# Patient Record
Sex: FEMALE | Race: WHITE | NOT HISPANIC OR LATINO
[De-identification: names, ages, dates, MRNs, and addresses within clinical notes are randomized per-mention and may not be internally consistent; named-entity substitution may affect disease eponyms.]

---

## 2017-07-18 ENCOUNTER — APPOINTMENT (OUTPATIENT)
Dept: RADIATION ONCOLOGY | Facility: CLINIC | Age: 82
End: 2017-07-18

## 2017-07-18 VITALS
TEMPERATURE: 98 F | RESPIRATION RATE: 16 BRPM | SYSTOLIC BLOOD PRESSURE: 167 MMHG | WEIGHT: 95.2 LBS | OXYGEN SATURATION: 94 % | DIASTOLIC BLOOD PRESSURE: 72 MMHG | HEART RATE: 74 BPM

## 2018-04-05 ENCOUNTER — EMERGENCY (EMERGENCY)
Facility: HOSPITAL | Age: 83
LOS: 1 days | Discharge: HOME | End: 2018-04-05
Attending: EMERGENCY MEDICINE
Payer: MEDICARE

## 2018-04-05 VITALS
OXYGEN SATURATION: 97 % | SYSTOLIC BLOOD PRESSURE: 175 MMHG | DIASTOLIC BLOOD PRESSURE: 71 MMHG | RESPIRATION RATE: 17 BRPM | HEART RATE: 79 BPM | TEMPERATURE: 97 F

## 2018-04-05 VITALS — WEIGHT: 97 LBS

## 2018-04-05 DIAGNOSIS — Z98.89 OTHER SPECIFIED POSTPROCEDURAL STATES: Chronic | ICD-10-CM

## 2018-04-05 DIAGNOSIS — Z98.49 CATARACT EXTRACTION STATUS, UNSPECIFIED EYE: Chronic | ICD-10-CM

## 2018-04-05 PROCEDURE — 70450 CT HEAD/BRAIN W/O DYE: CPT | Mod: 26

## 2018-04-05 PROCEDURE — 99284 EMERGENCY DEPT VISIT MOD MDM: CPT

## 2018-04-05 PROCEDURE — 72125 CT NECK SPINE W/O DYE: CPT

## 2018-04-05 PROCEDURE — 70450 CT HEAD/BRAIN W/O DYE: CPT

## 2018-04-05 PROCEDURE — 72125 CT NECK SPINE W/O DYE: CPT | Mod: 26

## 2018-04-05 PROCEDURE — 99284 EMERGENCY DEPT VISIT MOD MDM: CPT | Mod: 25

## 2018-04-05 RX ORDER — ACETAMINOPHEN 500 MG
975 TABLET ORAL ONCE
Qty: 0 | Refills: 0 | Status: COMPLETED | OUTPATIENT
Start: 2018-04-05 | End: 2018-04-05

## 2018-04-05 RX ADMIN — Medication 975 MILLIGRAM(S): at 20:52

## 2018-04-05 NOTE — ED ADULT NURSE REASSESSMENT NOTE - NS ED NURSE REASSESS COMMENT FT1
Pt discharged home with follow up care and instructions. PT educated about prescriptions and follow up instructions. Pt provided discharge instructions and IV removed. Pt had pressure bandage placed and bleeding controlled. Pt not in any distress at this time. Pt is complaining of discomfort, but is not going to take any medications tonight, pt will take prescription home and medicate tomorrow if needed. Pt education deemed successful after successful teach back was performed by pt and pts brother at the bedside. Pt discharged home with follow up care and instructions. PT educated about prescriptions and follow up instructions. Pt provided discharge instructions.  Pt not in any distress at this time. Pt is complaining of discomfort, but is not going to take any medications tonight, pt will take prescription home and medicate tomorrow if needed. Pt education deemed successful after successful teach back was performed by pt and pts brother at the bedside.

## 2018-04-05 NOTE — ED ADULT NURSE REASSESSMENT NOTE - NS ED NURSE REASSESS COMMENT FT1
Pt is resting in the stretcher with her family at the bedside. Pt is refusing to be medicated for her headache and her pain. Pt states she is "Afraid of the side effects of both and either of those medications". Pt understands after being educated that there is a benefit to the medication, but she still insisted to refuse. Pt was not given either medication she was ordered, the valium and fioricet. Pt to be discharged home with family.

## 2018-04-05 NOTE — ED ADULT TRIAGE NOTE - CHIEF COMPLAINT QUOTE
pt c/o "pressure" in head, started about 2 weeks ago but had subsided, returned today, went to stat and advised need ct but pt did not feel well enough to go

## 2018-04-05 NOTE — ED PROVIDER NOTE - MEDICAL DECISION MAKING DETAILS
CT head, neck, with HX of possible fracture in the neck. Will give Tylenol because pt is refusing all other medications.

## 2018-04-05 NOTE — ED PROVIDER NOTE - OBJECTIVE STATEMENT
89 y/o F pt with a pmhx of spinal Fx, arthritis, HTN, high cholesterol, hyponatremia, glaucoma, breast CA and cataract extraction presents to the ED c/o headache intermittent x2 weeks. Pain is exacerbating today. Describes a pressure. Explains that the headache is so bad that it is hurting her vision. Daughter at bedside to provide Hx that she has gone to her Onomatologist to get the vision checked and her PMD for the issue. Pt's daughter explains that the pt has been sleeping more often than not. Has taken Tylenol for the issue with relief enough to allow her to sleep. Today pt went to an urgent care center who referred her to the ED for a CT scan of the head. Allergic to penicillin and sulfenamide. Denies neurological/focal deficits, new onset back pain, visual changes, n/v/d/c, urinary symptoms, fall, recent travel or any other complaints. NKDA.   PMD: Dr. Tobin.

## 2018-04-05 NOTE — ED ADULT NURSE NOTE - OBJECTIVE STATEMENT
pt awake and alert, reports she has had a headache for appx 2 weeks. states it subsided but is now back again. pt with no vision changes, no numbness/tingling to extremities. even and unlabored resps, no extremity weakness.

## 2018-04-05 NOTE — ED ADULT NURSE REASSESSMENT NOTE - NS ED NURSE REASSESS COMMENT FT1
Ptis in the stretcher with her daughter at the bedside. Pt is expressing some pain at this time, but is not in any distress. Pt returned from CT and is now awaiting results at this time. Pt has no needs expressed at this time.

## 2018-04-05 NOTE — ED PROVIDER NOTE - PROGRESS NOTE DETAILS
CT results as noted.  Pt still c/o pressure in her head.  Fioricet ordered and will re-eval Pt refusing meds and would like to be d/c.  Pt's brother in ED to take her home.  Copies of CT's given to pt

## 2018-09-06 ENCOUNTER — APPOINTMENT (OUTPATIENT)
Dept: FAMILY MEDICINE | Facility: CLINIC | Age: 83
End: 2018-09-06

## 2018-09-17 ENCOUNTER — LABORATORY RESULT (OUTPATIENT)
Age: 83
End: 2018-09-17

## 2018-09-17 ENCOUNTER — APPOINTMENT (OUTPATIENT)
Dept: FAMILY MEDICINE | Facility: CLINIC | Age: 83
End: 2018-09-17
Payer: MEDICARE

## 2018-09-17 VITALS
HEIGHT: 56 IN | SYSTOLIC BLOOD PRESSURE: 142 MMHG | BODY MASS INDEX: 19.12 KG/M2 | DIASTOLIC BLOOD PRESSURE: 80 MMHG | OXYGEN SATURATION: 97 % | WEIGHT: 85 LBS | HEART RATE: 67 BPM

## 2018-09-17 DIAGNOSIS — Z85.3 PERSONAL HISTORY OF MALIGNANT NEOPLASM OF BREAST: ICD-10-CM

## 2018-09-17 DIAGNOSIS — Z87.891 PERSONAL HISTORY OF NICOTINE DEPENDENCE: ICD-10-CM

## 2018-09-17 DIAGNOSIS — Z86.39 PERSONAL HISTORY OF OTHER ENDOCRINE, NUTRITIONAL AND METABOLIC DISEASE: ICD-10-CM

## 2018-09-17 DIAGNOSIS — Z00.00 ENCOUNTER FOR GENERAL ADULT MEDICAL EXAMINATION W/OUT ABNORMAL FINDINGS: ICD-10-CM

## 2018-09-17 DIAGNOSIS — Z78.9 OTHER SPECIFIED HEALTH STATUS: ICD-10-CM

## 2018-09-17 DIAGNOSIS — R01.1 CARDIAC MURMUR, UNSPECIFIED: ICD-10-CM

## 2018-09-17 DIAGNOSIS — Z86.79 PERSONAL HISTORY OF OTHER DISEASES OF THE CIRCULATORY SYSTEM: ICD-10-CM

## 2018-09-17 DIAGNOSIS — E78.5 HYPERLIPIDEMIA, UNSPECIFIED: ICD-10-CM

## 2018-09-17 PROCEDURE — G0438: CPT

## 2018-09-17 PROCEDURE — 36415 COLL VENOUS BLD VENIPUNCTURE: CPT | Mod: 59

## 2018-09-17 PROCEDURE — 99214 OFFICE O/P EST MOD 30 MIN: CPT | Mod: 25

## 2018-09-17 NOTE — ASSESSMENT
[FreeTextEntry1] : CPE- lab/ declined immunization\par \par Weight loss/anxiety- discussed being on meds for anxiety/ patient declines and grand daughter aware. Patient states \par \par HTN- controlled / has slight edema / sees cardio \par \par heart murmur- get cardio consults\par \par Hyperlipidemia- renew Pravachol DORI.\par \par follow up 3 months PRN\par Bring health care Proxy

## 2018-09-17 NOTE — PHYSICAL EXAM

## 2018-09-17 NOTE — HISTORY OF PRESENT ILLNESS
[FreeTextEntry1] : establish care/ here with grand daughter [de-identified] : Previous MD stopped taking Medicaid hence was referred here.\par Feels okay overall,lives with her daughter and has her own apt where she cooks clean etc. When I questioned about weight loss patient states that she is little depressed as her 64 year old son drinks alcohol and her liver is affected now. she is very worries about her.\par She sees cardio\par She doesnot like taking any shots

## 2018-09-18 ENCOUNTER — MEDICATION RENEWAL (OUTPATIENT)
Age: 83
End: 2018-09-18

## 2018-09-25 ENCOUNTER — MEDICATION RENEWAL (OUTPATIENT)
Age: 83
End: 2018-09-25

## 2018-10-08 ENCOUNTER — RX RENEWAL (OUTPATIENT)
Age: 83
End: 2018-10-08

## 2018-10-09 ENCOUNTER — RX RENEWAL (OUTPATIENT)
Age: 83
End: 2018-10-09

## 2018-10-09 DIAGNOSIS — E55.9 VITAMIN D DEFICIENCY, UNSPECIFIED: ICD-10-CM

## 2018-10-16 ENCOUNTER — APPOINTMENT (OUTPATIENT)
Dept: FAMILY MEDICINE | Facility: CLINIC | Age: 83
End: 2018-10-16
Payer: MEDICARE

## 2018-10-16 VITALS
SYSTOLIC BLOOD PRESSURE: 140 MMHG | HEIGHT: 56 IN | HEART RATE: 68 BPM | WEIGHT: 85 LBS | BODY MASS INDEX: 19.12 KG/M2 | DIASTOLIC BLOOD PRESSURE: 72 MMHG

## 2018-10-16 DIAGNOSIS — E53.8 DEFICIENCY OF OTHER SPECIFIED B GROUP VITAMINS: ICD-10-CM

## 2018-10-16 LAB
25(OH)D3 SERPL-MCNC: 38.8 NG/ML
ALBUMIN SERPL ELPH-MCNC: 4.3 G/DL
ALP BLD-CCNC: 82 U/L
ALT SERPL-CCNC: 16 U/L
ANION GAP SERPL CALC-SCNC: 14 MMOL/L
APPEARANCE: CLEAR
AST SERPL-CCNC: 25 U/L
BASOPHILS # BLD AUTO: 0.03 K/UL
BASOPHILS NFR BLD AUTO: 0.8 %
BILIRUB SERPL-MCNC: 0.5 MG/DL
BILIRUBIN URINE: NEGATIVE
BLOOD URINE: NEGATIVE
BUN SERPL-MCNC: 22 MG/DL
CALCIUM SERPL-MCNC: 9.6 MG/DL
CHLORIDE SERPL-SCNC: 106 MMOL/L
CHOLEST SERPL-MCNC: 168 MG/DL
CHOLEST/HDLC SERPL: 2 RATIO
CO2 SERPL-SCNC: 22 MMOL/L
COLOR: YELLOW
CREAT SERPL-MCNC: 1.56 MG/DL
EOSINOPHIL # BLD AUTO: 0.11 K/UL
EOSINOPHIL NFR BLD AUTO: 2.9 %
FERRITIN SERPL-MCNC: 111 NG/ML
FOLATE SERPL-MCNC: 7.6 NG/ML
GLUCOSE QUALITATIVE U: NEGATIVE MG/DL
GLUCOSE SERPL-MCNC: 80 MG/DL
HBA1C MFR BLD HPLC: 5.4 %
HCT VFR BLD CALC: 33 %
HDLC SERPL-MCNC: 82 MG/DL
HGB BLD-MCNC: 10.5 G/DL
IMM GRANULOCYTES NFR BLD AUTO: 0.3 %
IRON SATN MFR SERPL: 54 %
IRON SERPL-MCNC: 150 UG/DL
KETONES URINE: NEGATIVE
LDLC SERPL CALC-MCNC: 69 MG/DL
LEUKOCYTE ESTERASE URINE: NEGATIVE
LYMPHOCYTES # BLD AUTO: 1.83 K/UL
LYMPHOCYTES NFR BLD AUTO: 48.4 %
MAN DIFF?: NORMAL
MCHC RBC-ENTMCNC: 31.8 GM/DL
MCHC RBC-ENTMCNC: 33.2 PG
MCV RBC AUTO: 104.4 FL
MONOCYTES # BLD AUTO: 0.42 K/UL
MONOCYTES NFR BLD AUTO: 11.1 %
NEUTROPHILS # BLD AUTO: 1.38 K/UL
NEUTROPHILS NFR BLD AUTO: 36.5 %
NITRITE URINE: NEGATIVE
PH URINE: 5
PLATELET # BLD AUTO: 201 K/UL
POTASSIUM SERPL-SCNC: 5.7 MMOL/L
PROT SERPL-MCNC: 6.8 G/DL
PROTEIN URINE: 30 MG/DL
RBC # BLD: 3.16 M/UL
RBC # FLD: 14.4 %
SODIUM SERPL-SCNC: 142 MMOL/L
SPECIFIC GRAVITY URINE: 1.01
T PALLIDUM AB SER QL IA: NEGATIVE
T4 FREE SERPL-MCNC: 1.2 NG/DL
TIBC SERPL-MCNC: 277 UG/DL
TRIGL SERPL-MCNC: 84 MG/DL
TSH SERPL-ACNC: 3.95 UIU/ML
UIBC SERPL-MCNC: 127 UG/DL
UROBILINOGEN URINE: NEGATIVE MG/DL
VIT B12 SERPL-MCNC: 328 PG/ML
WBC # FLD AUTO: 3.78 K/UL

## 2018-10-16 PROCEDURE — 36415 COLL VENOUS BLD VENIPUNCTURE: CPT

## 2018-10-16 PROCEDURE — 99214 OFFICE O/P EST MOD 30 MIN: CPT | Mod: 25

## 2018-10-16 NOTE — HISTORY OF PRESENT ILLNESS
[FreeTextEntry1] : follow up on abnormal lab / here with grand daughter [de-identified] : Previous MD stopped taking Medicaid hence was referred here.\par Feels okay overall,lives with her daughter and has her own apt where she cooks clean etc. When I questioned about weight loss patient states that she is little depressed as her 64 year old son drinks alcohol and her liver is affected now. she is very worries about her.\par She sees cardio\par She doesnot like taking any shots\par 10/2018- abnormal lab follow up\par offers no complaints

## 2018-10-16 NOTE — HEALTH RISK ASSESSMENT
[] : No [No falls in past year] : Patient reported no falls in the past year [0] : 2) Feeling down, depressed, or hopeless: Not at all (0) [Change in mental status noted] : No change in mental status noted [Language] : denies difficulty with language [Handling Complex Tasks] : denies difficulty handling complex tasks [None] : None [With Family] : lives with family [Retired] : retired [Fully functional (bathing, dressing, toileting, transferring, walking, feeding)] : Fully functional (bathing, dressing, toileting, transferring, walking, feeding) [Fully functional (using the telephone, shopping, preparing meals, housekeeping, doing laundry, using] : Fully functional and needs no help or supervision to perform IADLs (using the telephone, shopping, preparing meals, housekeeping, doing laundry, using transportation, managing medications and managing finances) [Discussed at today's visit] : Advance Directives Discussed at today's visit

## 2018-10-16 NOTE — PHYSICAL EXAM

## 2018-10-16 NOTE — ASSESSMENT
[FreeTextEntry1] : Hyperkalemia- will get new lab/CMP\par Anemia and low Vit b12- advise SL B12 3x/week\par \par follow up 3 months\par \par CPE- lab/ declined immunization\par \par Weight loss/anxiety- discussed being on meds for anxiety/ patient declines and grand daughter aware. Patient states \par \par HTN- controlled / has slight edema / sees cardio \par \par heart murmur- get cardio consults\par \par Hyperlipidemia- renew Pravachol DORI.\par \par follow up 3 months PRN\par Bring health care Proxy

## 2018-10-18 ENCOUNTER — RX RENEWAL (OUTPATIENT)
Age: 83
End: 2018-10-18

## 2018-10-24 ENCOUNTER — RESULT REVIEW (OUTPATIENT)
Age: 83
End: 2018-10-24

## 2018-10-24 LAB
ALBUMIN SERPL ELPH-MCNC: 4.5 G/DL
ALP BLD-CCNC: 81 U/L
ALT SERPL-CCNC: 11 U/L
ANION GAP SERPL CALC-SCNC: 12 MMOL/L
AST SERPL-CCNC: 23 U/L
BILIRUB SERPL-MCNC: 0.4 MG/DL
BUN SERPL-MCNC: 28 MG/DL
CALCIUM SERPL-MCNC: 9.5 MG/DL
CHLORIDE SERPL-SCNC: 110 MMOL/L
CO2 SERPL-SCNC: 22 MMOL/L
CREAT SERPL-MCNC: 1.57 MG/DL
GLUCOSE SERPL-MCNC: 84 MG/DL
POTASSIUM SERPL-SCNC: 5.2 MMOL/L
PROT SERPL-MCNC: 7 G/DL
SODIUM SERPL-SCNC: 144 MMOL/L

## 2018-11-05 DIAGNOSIS — H91.90 UNSPECIFIED HEARING LOSS, UNSPECIFIED EAR: ICD-10-CM

## 2018-12-10 PROBLEM — H91.90 HARD OF HEARING: Status: ACTIVE | Noted: 2018-09-17

## 2018-12-17 ENCOUNTER — APPOINTMENT (OUTPATIENT)
Dept: FAMILY MEDICINE | Facility: CLINIC | Age: 83
End: 2018-12-17

## 2019-03-04 ENCOUNTER — MEDICATION RENEWAL (OUTPATIENT)
Age: 84
End: 2019-03-04

## 2019-04-01 ENCOUNTER — RX RENEWAL (OUTPATIENT)
Age: 84
End: 2019-04-01

## 2019-04-04 ENCOUNTER — INPATIENT (INPATIENT)
Facility: HOSPITAL | Age: 84
LOS: 5 days | Discharge: HOME | DRG: 438 | End: 2019-04-10
Admitting: HOSPITALIST
Payer: MEDICARE

## 2019-04-04 ENCOUNTER — RX RENEWAL (OUTPATIENT)
Age: 84
End: 2019-04-04

## 2019-04-04 VITALS
HEART RATE: 83 BPM | TEMPERATURE: 98 F | RESPIRATION RATE: 20 BRPM | OXYGEN SATURATION: 95 % | DIASTOLIC BLOOD PRESSURE: 81 MMHG | WEIGHT: 130.07 LBS | SYSTOLIC BLOOD PRESSURE: 181 MMHG

## 2019-04-04 DIAGNOSIS — K85.90 ACUTE PANCREATITIS WITHOUT NECROSIS OR INFECTION, UNSPECIFIED: ICD-10-CM

## 2019-04-04 DIAGNOSIS — Z98.49 CATARACT EXTRACTION STATUS, UNSPECIFIED EYE: Chronic | ICD-10-CM

## 2019-04-04 DIAGNOSIS — Z98.89 OTHER SPECIFIED POSTPROCEDURAL STATES: Chronic | ICD-10-CM

## 2019-04-04 LAB
ALBUMIN SERPL ELPH-MCNC: 4.3 G/DL — SIGNIFICANT CHANGE UP (ref 3.3–5.2)
ALP SERPL-CCNC: 249 U/L — HIGH (ref 40–120)
ALT FLD-CCNC: 188 U/L — HIGH
ANION GAP SERPL CALC-SCNC: 16 MMOL/L — SIGNIFICANT CHANGE UP (ref 5–17)
APPEARANCE UR: CLEAR — SIGNIFICANT CHANGE UP
APTT BLD: 37.2 SEC — HIGH (ref 27.5–36.3)
AST SERPL-CCNC: 478 U/L — HIGH
BILIRUB SERPL-MCNC: 1.5 MG/DL — SIGNIFICANT CHANGE UP (ref 0.4–2)
BILIRUB UR-MCNC: NEGATIVE — SIGNIFICANT CHANGE UP
BUN SERPL-MCNC: 37 MG/DL — HIGH (ref 8–20)
CALCIUM SERPL-MCNC: 10.2 MG/DL — SIGNIFICANT CHANGE UP (ref 8.6–10.2)
CHLORIDE SERPL-SCNC: 106 MMOL/L — SIGNIFICANT CHANGE UP (ref 98–107)
CHOLEST SERPL-MCNC: 236 MG/DL — HIGH (ref 110–199)
CO2 SERPL-SCNC: 22 MMOL/L — SIGNIFICANT CHANGE UP (ref 22–29)
COLOR SPEC: YELLOW — SIGNIFICANT CHANGE UP
CREAT SERPL-MCNC: 2.18 MG/DL — HIGH (ref 0.5–1.3)
D DIMER BLD IA.RAPID-MCNC: 1947 NG/ML DDU — HIGH
DIFF PNL FLD: ABNORMAL
GLUCOSE SERPL-MCNC: 141 MG/DL — HIGH (ref 70–115)
GLUCOSE UR QL: NEGATIVE MG/DL — SIGNIFICANT CHANGE UP
HCT VFR BLD CALC: 32.7 % — LOW (ref 37–47)
HDLC SERPL-MCNC: 130 MG/DL — SIGNIFICANT CHANGE UP
HGB BLD-MCNC: 10.7 G/DL — LOW (ref 12–16)
INR BLD: 0.85 RATIO — LOW (ref 0.88–1.16)
KETONES UR-MCNC: NEGATIVE — SIGNIFICANT CHANGE UP
LEUKOCYTE ESTERASE UR-ACNC: NEGATIVE — SIGNIFICANT CHANGE UP
LIDOCAIN IGE QN: >3000 U/L — HIGH (ref 22–51)
LIPID PNL WITH DIRECT LDL SERPL: 77 MG/DL — SIGNIFICANT CHANGE UP
MCHC RBC-ENTMCNC: 32.7 G/DL — SIGNIFICANT CHANGE UP (ref 32–36)
MCHC RBC-ENTMCNC: 33.3 PG — HIGH (ref 27–31)
MCV RBC AUTO: 101.9 FL — HIGH (ref 81–99)
NITRITE UR-MCNC: NEGATIVE — SIGNIFICANT CHANGE UP
NT-PROBNP SERPL-SCNC: 1735 PG/ML — HIGH (ref 0–300)
PH UR: 6.5 — SIGNIFICANT CHANGE UP (ref 5–8)
PLATELET # BLD AUTO: 209 K/UL — SIGNIFICANT CHANGE UP (ref 150–400)
POTASSIUM SERPL-MCNC: 4.5 MMOL/L — SIGNIFICANT CHANGE UP (ref 3.5–5.3)
POTASSIUM SERPL-SCNC: 4.5 MMOL/L — SIGNIFICANT CHANGE UP (ref 3.5–5.3)
PROT SERPL-MCNC: 8 G/DL — SIGNIFICANT CHANGE UP (ref 6.6–8.7)
PROT UR-MCNC: 100 MG/DL
PROTHROM AB SERPL-ACNC: 9.7 SEC — LOW (ref 10–12.9)
RBC # BLD: 3.21 M/UL — LOW (ref 4.4–5.2)
RBC # FLD: 15.8 % — HIGH (ref 11–15.6)
SODIUM SERPL-SCNC: 144 MMOL/L — SIGNIFICANT CHANGE UP (ref 135–145)
SP GR SPEC: 1.01 — SIGNIFICANT CHANGE UP (ref 1.01–1.02)
TOTAL CHOLESTEROL/HDL RATIO MEASUREMENT: 2 RATIO — LOW (ref 3.3–7.1)
TRIGL SERPL-MCNC: 143 MG/DL — SIGNIFICANT CHANGE UP (ref 10–200)
TROPONIN T SERPL-MCNC: 0.02 NG/ML — SIGNIFICANT CHANGE UP (ref 0–0.06)
UROBILINOGEN FLD QL: NEGATIVE MG/DL — SIGNIFICANT CHANGE UP
WBC # BLD: 11.2 K/UL — HIGH (ref 4.8–10.8)
WBC # FLD AUTO: 11.2 K/UL — HIGH (ref 4.8–10.8)

## 2019-04-04 PROCEDURE — 71250 CT THORAX DX C-: CPT | Mod: 26

## 2019-04-04 PROCEDURE — 74019 RADEX ABDOMEN 2 VIEWS: CPT | Mod: 26

## 2019-04-04 PROCEDURE — 99223 1ST HOSP IP/OBS HIGH 75: CPT | Mod: GC

## 2019-04-04 PROCEDURE — 74176 CT ABD & PELVIS W/O CONTRAST: CPT | Mod: 26

## 2019-04-04 PROCEDURE — 93010 ELECTROCARDIOGRAM REPORT: CPT

## 2019-04-04 PROCEDURE — 99285 EMERGENCY DEPT VISIT HI MDM: CPT

## 2019-04-04 PROCEDURE — 76705 ECHO EXAM OF ABDOMEN: CPT | Mod: 26

## 2019-04-04 PROCEDURE — 71046 X-RAY EXAM CHEST 2 VIEWS: CPT | Mod: 26

## 2019-04-04 RX ORDER — FENTANYL CITRATE 50 UG/ML
1 INJECTION INTRAVENOUS
Qty: 0 | Refills: 0 | Status: DISCONTINUED | OUTPATIENT
Start: 2019-04-04 | End: 2019-04-10

## 2019-04-04 RX ORDER — LEVOTHYROXINE SODIUM 125 MCG
25 TABLET ORAL DAILY
Qty: 0 | Refills: 0 | Status: DISCONTINUED | OUTPATIENT
Start: 2019-04-04 | End: 2019-04-10

## 2019-04-04 RX ORDER — HYDRALAZINE HCL 50 MG
5 TABLET ORAL ONCE
Qty: 0 | Refills: 0 | Status: COMPLETED | OUTPATIENT
Start: 2019-04-04 | End: 2019-04-04

## 2019-04-04 RX ORDER — ONDANSETRON 8 MG/1
4 TABLET, FILM COATED ORAL ONCE
Qty: 0 | Refills: 0 | Status: COMPLETED | OUTPATIENT
Start: 2019-04-04 | End: 2019-04-04

## 2019-04-04 RX ORDER — ACETAMINOPHEN 500 MG
1000 TABLET ORAL ONCE
Qty: 0 | Refills: 0 | Status: COMPLETED | OUTPATIENT
Start: 2019-04-04 | End: 2019-04-04

## 2019-04-04 RX ORDER — NIFEDIPINE 30 MG
30 TABLET, EXTENDED RELEASE 24 HR ORAL DAILY
Qty: 0 | Refills: 0 | Status: DISCONTINUED | OUTPATIENT
Start: 2019-04-04 | End: 2019-04-08

## 2019-04-04 RX ORDER — ASPIRIN/CALCIUM CARB/MAGNESIUM 324 MG
81 TABLET ORAL DAILY
Qty: 0 | Refills: 0 | Status: DISCONTINUED | OUTPATIENT
Start: 2019-04-04 | End: 2019-04-10

## 2019-04-04 RX ORDER — ATORVASTATIN CALCIUM 80 MG/1
10 TABLET, FILM COATED ORAL AT BEDTIME
Qty: 0 | Refills: 0 | Status: DISCONTINUED | OUTPATIENT
Start: 2019-04-05 | End: 2019-04-05

## 2019-04-04 RX ORDER — SODIUM CHLORIDE 9 MG/ML
1000 INJECTION, SOLUTION INTRAVENOUS
Qty: 0 | Refills: 0 | Status: DISCONTINUED | OUTPATIENT
Start: 2019-04-04 | End: 2019-04-05

## 2019-04-04 RX ORDER — FOLIC ACID 0.8 MG
1 TABLET ORAL DAILY
Qty: 0 | Refills: 0 | Status: DISCONTINUED | OUTPATIENT
Start: 2019-04-04 | End: 2019-04-10

## 2019-04-04 RX ORDER — PREGABALIN 225 MG/1
1000 CAPSULE ORAL DAILY
Qty: 0 | Refills: 0 | Status: DISCONTINUED | OUTPATIENT
Start: 2019-04-04 | End: 2019-04-10

## 2019-04-04 RX ORDER — ONDANSETRON 8 MG/1
4 TABLET, FILM COATED ORAL EVERY 6 HOURS
Qty: 0 | Refills: 0 | Status: DISCONTINUED | OUTPATIENT
Start: 2019-04-04 | End: 2019-04-10

## 2019-04-04 RX ADMIN — FENTANYL CITRATE 1 PATCH: 50 INJECTION INTRAVENOUS at 21:20

## 2019-04-04 RX ADMIN — SODIUM CHLORIDE 100 MILLILITER(S): 9 INJECTION, SOLUTION INTRAVENOUS at 22:18

## 2019-04-04 RX ADMIN — Medication 5 MILLIGRAM(S): at 18:30

## 2019-04-04 RX ADMIN — Medication 400 MILLIGRAM(S): at 14:00

## 2019-04-04 RX ADMIN — ONDANSETRON 4 MILLIGRAM(S): 8 TABLET, FILM COATED ORAL at 14:00

## 2019-04-04 NOTE — H&P ADULT - ASSESSMENT
89 yo F with PMH of HTN, Glaucoma, Hypothyroidism, Hx of Rt Breast CA s/p lumpectomy and radiation with abd pain radiating to back associated with n/v admitted for acute pancreatitis    Acute pancreatitis  - CT abd pelvis 89 yo F with PMH of HTN, Glaucoma, Hypothyroidism, Hx of Rt Breast CA s/p lumpectomy and radiation with abd pain radiating to back associated with n/v admitted for acute pancreatitis    Acute pancreatitis  - Elevated lipase >3000; WBC 11.2; hemodynamically stable  - BISAP score of 3; Ransons pancreatitis: 3 (Age, AST, ALT)  - CT abd pelvis pancreas grossly normal; Cholelithiasis w/o CT evidence of Cholecystitis  - U/s Gallbladder: Cholelithiasis; CBD 8mm wnl  - NPO till n/v subsidies, will PO challenge when medically stable  - IVF plasmalyte   - Pain control  - strict I&O  - f/u lipid panel    NAVIN  - most recent Cr 2.18; baseline Cr 1.45-1.55  - continue IVF  - f/u CMP AM    Transaminitis  - ; ; Alk phos 249  - most likely from pancreatitis    Cholelithiasis w/o cholecystitis  - incidental finding on U/S and CT abd pelvis  - continue to monitor    HTN  - c/w Nifedipine  - c/w ASA 81  - monitor VS    CKD 4 GFR 18  - most likely from HTN  - continue to monitor     Heart Murmur  - most likely AS  - last ECHO 2016 normal LVSF; Grade 1 DD; Mild-mod TR; mild Pul HTN; mild AS    Hypothyroidism  - c/w synthroid 25mcg  - f/u TSH    Glaucoma  - c/w cosopt     DVT PPx: venodyne  Advance Directive: Full code, no official HCP. Discussion at bedside with daughters and family to start a conversation for advance care directives 89 yo F with PMH of HTN, Glaucoma, Hypothyroidism, Hx of Rt Breast CA s/p lumpectomy and radiation with abd pain radiating to back associated with n/v admitted for acute pancreatitis    Acute pancreatitis  - Elevated lipase >3000; WBC 11.2; hemodynamically stable  - BISAP score of 3; Ransons pancreatitis: 3 (Age, AST, ALT)  - CT abd pelvis pancreas grossly normal; Cholelithiasis w/o CT evidence of Cholecystitis  - U/s Gallbladder: Cholelithiasis; CBD 8mm wnl  - NPO till n/v subsidies, will PO challenge when medically stable  - IVF plasmalyte   - Pain control  - strict I&O  - f/u lipid panel    NAVIN  - most recent Cr 2.18; baseline Cr 1.45-1.55  - continue IVF  - f/u CMP AM    Transaminitis  - ; ; Alk phos 249  - most likely from pancreatitis    Cholelithiasis w/o cholecystitis  - incidental finding on U/S and CT abd pelvis  - continue to monitor    Anemia  - macrocytic with   - folic acid and vitamin b12  - f/u AM CBC, folate, Vitamin B12 level    HTN  - c/w Nifedipine  - c/w ASA 81  - monitor VS    CKD 4 GFR 18  - most likely from HTN  - continue to monitor     Heart Murmur  - most likely AS  - last ECHO 2016 normal LVSF; Grade 1 DD; Mild-mod TR; mild Pul HTN; mild AS  - will evaluate the need for ECHO once medically stable    Hypothyroidism  - c/w synthroid 25mcg  - f/u TSH    Glaucoma  - c/w cosopt       DVT PPx: venodyne  Advance Directive: Full code, no official HCP. Discussion at bedside with daughters and family to start a conversation for advance care directives 89 yo F with PMH of HTN, Glaucoma, Hypothyroidism, Hx of Rt Breast CA s/p lumpectomy and radiation with abd pain radiating to back associated with n/v admitted for acute pancreatitis    Acute pancreatitis  - Elevated lipase >3000; WBC 11.2; hemodynamically stable  - BISAP score of 3; Ransons pancreatitis: 3 (Age, AST, ALT)  - CT abd pelvis pancreas grossly normal; Cholelithiasis w/o CT evidence of Cholecystitis  - U/s Gallbladder: Cholelithiasis; CBD 8mm wnl  - NPO till n/v subsidies, will PO challenge when medically stable  - IVF plasmalyte   - Pain control  - strict I&O  - Lipid panel wnl    NAVIN  - most recent Cr 2.18; baseline Cr 1.45-1.55  - continue IVF  - f/u CMP AM    Transaminitis  - ; ; Alk phos 249  - most likely from pancreatitis    Cholelithiasis w/o cholecystitis  - incidental finding on U/S and CT abd pelvis  - continue to monitor    Anemia  - macrocytic with   - folic acid and vitamin b12  - f/u AM CBC, folate, Vitamin B12 level    HTN  - c/w Nifedipine  - c/w ASA 81  - monitor VS    CKD 4 GFR 18  - most likely from HTN  - continue to monitor     Heart Murmur  - most likely AS  - last ECHO 2016 normal LVSF; Grade 1 DD; Mild-mod TR; mild Pul HTN; mild AS  - will evaluate the need for ECHO once medically stable    Hypothyroidism  - c/w synthroid 25mcg  - f/u TSH    Glaucoma  - c/w cosopt       DVT PPx: venodyne  Advance Directive: Full code, no official HCP. Discussion at bedside with daughters and family to start a conversation for advance care directives 89 yo F with PMH of HTN, Glaucoma, Hypothyroidism, Hx of Rt Breast CA s/p lumpectomy and radiation with abd pain radiating to back associated with n/v admitted for acute pancreatitis    Acute pancreatitis  - Elevated lipase >3000; WBC 11.2; hemodynamically stable  - BISAP score of 3; Ransons pancreatitis: 3 (Age, AST, ALT)  - CT abd pelvis pancreas grossly normal; Cholelithiasis w/o CT evidence of Cholecystitis  - U/s Gallbladder: Cholelithiasis; CBD 8mm wnl  - NPO till n/v subsidies, will PO challenge when medically stable  - IVF plasmalyte   - Pain control  - strict I&O  - Lipid panel wnl    NAVIN  - most recent Cr 2.18; baseline Cr 1.45-1.55  - continue IVF  - f/u CMP AM    Transaminitis  - ; ; Alk phos 249  - most likely from pancreatitis    Cholelithiasis w/o cholecystitis  - incidental finding on U/S and CT abd pelvis  - continue to monitor    Anemia  - macrocytic with   - folic acid and vitamin b12  - f/u AM CBC, folate, Vitamin B12 level    HTN  - c/w Nifedipine  - c/w ASA 81  - monitor VS    CKD 4 GFR 18  - most likely from HTN  - continue to monitor     Heart Murmur  - most likely AS  - last ECHO 2016 normal LVSF; Grade 1 DD; Mild-mod TR; mild Pul HTN; mild AS  - will evaluate the need for ECHO once medically stable    Hypothyroidism  - c/w synthroid 25mcg  - f/u TSH    DVT PPx: venodyne  Advance Directive: Full code, no official HCP. Discussion at bedside with daughters and family to start a conversation for advance care directives 89 yo F with PMH of HTN, Glaucoma, Hypothyroidism, Hx of Rt Breast CA s/p lumpectomy and radiation with abd pain radiating to back associated with n/v admitted for acute pancreatitis    Acute pancreatitis  - Elevated lipase >3000; WBC 11.2; hemodynamically stable  - BISAP score of 3; Ransons pancreatitis: 3 (Age, AST, ALT)  - CT abd pelvis pancreas grossly normal; Cholelithiasis w/o CT evidence of Cholecystitis  - U/s Gallbladder: Cholelithiasis; CBD 8mm wnl  - NPO till n/v subsidies, will PO challenge when medically stable  - IVF plasmalyte   - Pain control  - strict I&O  - Lipid panel wnl  - GI consult    NAVIN  - most recent Cr 2.18; baseline Cr 1.45-1.55  - continue IVF  - f/u CMP AM    Transaminitis  - ; ; Alk phos 249  - most likely from pancreatitis    Pleural Effusion  - most likely reactive to pancreatitis  - pt maintaining O2 Sat >92% RA  - continue to monitor    Cholelithiasis w/o cholecystitis  - incidental finding on U/S and CT abd pelvis  - continue to monitor    Anemia  - macrocytic with   - folic acid and vitamin b12  - f/u AM CBC, folate, Vitamin B12 level    HTN  - c/w Nifedipine  - c/w ASA 81  - monitor VS    CKD 4 GFR 18  - most likely from HTN  - continue to monitor     Heart Murmur  - most likely AS  - last ECHO 2016 normal LVSF; Grade 1 DD; Mild-mod TR; mild Pul HTN; mild AS  - will evaluate the need for ECHO once medically stable    Hypothyroidism  - c/w synthroid 25mcg  - f/u TSH    T11-T12 compression fracture  - incidental finding on CT scan  - pain control    DVT PPx: venodyne  Advance Directive: Full code, no official HCP. Discussion at bedside with daughters and family to start a conversation for advance care directives. Will consult palliative care 89 yo F with PMH of HTN, Glaucoma, Hypothyroidism, Hx of Rt Breast CA s/p lumpectomy and radiation with abd pain radiating to back associated with n/v admitted for acute pancreatitis    Acute pancreatitis  - Elevated lipase >3000; WBC 11.2; hemodynamically stable  - BISAP score of 3; Ransons pancreatitis: 3 (Age, AST, ALT)  - CT abd pelvis pancreas grossly normal; Cholelithiasis w/o CT evidence of Cholecystitis  - U/s Gallbladder: Cholelithiasis; CBD 8mm wnl  - NPO till n/v subsidies, will PO challenge when medically stable  - IVF plasmalyte   - Pain control  - strict I&O  - Lipid panel wnl  - GI consult    NAVIN  - most recent Cr 2.18; baseline Cr 1.45-1.55  - continue IVF  - f/u CMP AM    Acute Transaminitis  - ; ; Alk phos 249  - most likely from pancreatitis    Pleural Effusion  - most likely reactive to pancreatitis  - pt maintaining O2 Sat >92% RA  - continue to monitor    Cholelithiasis w/o cholecystitis  - incidental finding on U/S and CT abd pelvis  - continue to monitor    Anemia  - macrocytic with   - folic acid and vitamin b12  - f/u AM CBC, folate, Vitamin B12 level    HTN  - c/w Nifedipine  - c/w ASA 81  - monitor VS    CKD 4 GFR 18  - most likely from HTN  - continue to monitor     Heart Murmur  - most likely AS  - last ECHO 2016 normal LVSF; Grade 1 DD; Mild-mod TR; mild Pul HTN; mild AS  - will evaluate the need for ECHO once medically stable    Hypothyroidism  - c/w synthroid 25mcg  - f/u TSH    T11-T12 compression fracture  - incidental finding on CT scan  - pain control    DVT PPx: venodyne  Advance Directive: Full code, no official HCP. Discussion at bedside with daughters and family to start a conversation for advance care directives. Will consult palliative care for discussion with family.

## 2019-04-04 NOTE — ED ADULT NURSE NOTE - INTERVENTIONS DEFINITIONS
Room bathroom lighting operational/Non-slip footwear when patient is off stretcher/Monitor gait and stability/Monitor for mental status changes and reorient to person, place, and time/Reinforce activity limits and safety measures with patient and family/Instruct patient to call for assistance/Physically safe environment: no spills, clutter or unnecessary equipment

## 2019-04-04 NOTE — H&P ADULT - NSICDXPASTMEDICALHX_GEN_ALL_CORE_FT
PAST MEDICAL HISTORY:  Breast cancer s/p RT lumpectomy and radiation    Glaucoma     HTN (hypertension)     Hypothyroid

## 2019-04-04 NOTE — H&P ADULT - HISTORY OF PRESENT ILLNESS
89 yo F with PMH of HTN, Hypothyroid, Hx of Rt Breast CA s/p lumpectomy, Glaucoma and radiation presents to ED from home with c/o of worsening abd pain and nausea/vomiting. Pt states she has had generalized abd discomfort for few months, worsening last night, started in the mid abd radiating to back, occasionally to chest, 8/10, pressure like associated with Nausea and vomiting x 5-6 starting this AM. Clear, non billious, non bloody. No previous episode. +chills. pt denies fever,  Last night pt states she had tomato rice and ice cream. Diet consist of soup, chicken. Pt denies recent EtOH intake, hx of tobacco smoker x 10 yrs, quit long time ago. Hx of constipation, last BM 4/2/19 89 yo F with PMH of HTN, Hypothyroid, Hx of Rt Breast CA s/p lumpectomy, Glaucoma and radiation presents to ED from home with c/o of worsening abd pain and nausea/vomiting. Pt states she has had generalized abd discomfort for few months, worsening last night, started in the mid abd radiating to back, occasionally to chest, 8/10, pressure like associated with Nausea and vomiting x 5-6 starting this AM. Clear, non billious, non bloody. No previous episode. +chills. pt denies fever,  Last night pt states she had tomato rice and ice cream. Diet consist of soup, chicken. Pt denies recent EtOH intake, hx of tobacco smoker x 10 yrs, quit long time ago. Hx of constipation, last BM 4/2/19    In ED: IV tylenol x1, zofran x 1, fentanyl patch x 1, hydralazine 5 IVpush x 1

## 2019-04-04 NOTE — H&P ADULT - NSICDXFAMILYHX_GEN_ALL_CORE_FT
FAMILY HISTORY:  Family history of brain cancer, Brother  Family history of uterine cancer, Sister - cervical/uterine  Family hx of lung cancer, 2 Brothers

## 2019-04-04 NOTE — H&P ADULT - NSHPSOCIALHISTORY_GEN_ALL_CORE
Lives at home with Daughter, Son-in-law, daugthers  Occasional EtOH- [employer]  Hx of tobacco smoker, quit long time ago  denies illicit drug use

## 2019-04-04 NOTE — H&P ADULT - NSHPPHYSICALEXAM_GEN_ALL_CORE
T(C): 36.9 (19 @ 12:01), Max: 36.9 (19 @ 12:01)  HR: 75 (19 @ 18:30) (75 - 83)  BP: 167/70 (19 @ 18:30) (167/70 - 181/81)  RR: 20 (19 @ 18:30) (16 - 20)  SpO2: 96% (19 @ 18:30) (95% - 96%)    GEN - Pleasant, elderly  Female, lying in bed  HEENT - NCAT, EOMI, LOYDA, clear sclera, moist oral mucosa, Lt ear hearing aid  Pul - Good air entry, +basilar crackles Rt>Lt, no wheeze  CVS - +S1S2, RRR. +murmur 2nd RICS Grade 3/6 systolic, +3rd/5th LT parasternal murmur  GI - Soft, ND, NT, no guarding, no rebound tenderness, tympanic to percussion  Ext - No ESTHER. Rt mid leg 3x3 healed wound +dried scab noted, 2+ b/l DP pulses, +onychomycosis b/l feet  MSK - full ROM of BL upper and lower extremities without pain or restriction.   Skin - clean, dry, intact. no rashes or lesions.    Psych- AAOx3 (person, place, ). normal affect T(C): 36.9 (19 @ 12:01), Max: 36.9 (19 @ 12:01)  HR: 75 (19 @ 18:30) (75 - 83)  BP: 167/70 (19 @ 18:30) (167/70 - 181/81)  RR: 20 (19 @ 18:30) (16 - 20)  SpO2: 96% (19 @ 18:30) (95% - 96%)    GEN - Pleasant, elderly  Female, lying in bed  HEENT - NCAT, EOMI, LOYDA, clear sclera, moist oral mucosa, Lt ear hearing aid  Pul - Good air entry, +basilar crackles Rt>Lt, no wheeze  CVS - +S1S2, RRR. +murmur 2nd RICS Grade 3/6 systolic, +3rd/5th LT parasternal murmur  GI - Soft, ND, NT, no guarding, no rebound tenderness, tympanic to percussion  Ext - No ESTHER. Rt mid leg 3x3 healed wound +dried scab noted, 2+ b/l DP pulses, +onychomycosis b/l feet  MSK - full ROM of BL upper and lower extremities without pain or restriction.   Skin - clean, dry, intact. no rashes or lesions.    Psych- AAOx4 (person, place, ). normal affect

## 2019-04-04 NOTE — ED ADULT NURSE REASSESSMENT NOTE - NS ED NURSE REASSESS COMMENT FT1
Assumed patient responsibility at 1415. Patient nauseated, vomiting. Complains of lower abdominal pain "squeezing". Patient hemodynamically stable. Will continue to monitor.

## 2019-04-04 NOTE — ED PROVIDER NOTE - OBJECTIVE STATEMENT
89 yo female pmh breast ca, glaucome, htn , hypothyroid comes to ed with abdominal pain, nausea and vomiting since yesterday

## 2019-04-04 NOTE — ED PROVIDER NOTE - CLINICAL SUMMARY MEDICAL DECISION MAKING FREE TEXT BOX
elderly female with abdominal pain  in ruq with radiation to back ; eval gallbladder, aorta;  vs pancreas

## 2019-04-04 NOTE — H&P ADULT - NSHPLABSRESULTS_GEN_ALL_CORE
10.7   11.2  )-----------( 209      ( 04 Apr 2019 15:04 )             32.7   04-04    144  |  106  |  37.0<H>  ----------------------------<  141<H>  4.5   |  22.0  |  2.18<H>    Ca    10.2      04 Apr 2019 15:04    TPro  8.0  /  Alb  4.3  /  TBili  1.5  /  DBili  x   /  AST  478<H>  /  ALT  188<H>  /  AlkPhos  249<H>  04-04    PT/INR - ( 04 Apr 2019 15:04 )   PT: 9.7 sec;   INR: 0.85 ratio    PTT - ( 04 Apr 2019 15:04 )  PTT:37.2 sec    D-Dimer Assay, Quantitative (04.04.19 @ 15:04)    D-Dimer Assay, Quantitative: 1947    EKG: NSR HR80; 1st degree av block AK 242ms, LVH     EXAM:  XR CHEST PA LAT 2V                          PROCEDURE DATE:  04/04/2019          INTERPRETATION:  PA and lateral chest radiographs     COMPARISON: 2/17/2016 esophagram     CLINICAL INFORMATION: right-sided chest pain..    FINDINGS:  There are bilateral pleural effusions blunting costophrenic angles.   Posterior basilar atelectasis cannot be excluded. Upper lobes clear.  The airway is midline.  RIGHT mid lung peripheral 4 mm nodule unchanged compared to prior chest   radiograph 3/13/2017indicating granuloma. ..   Heart size within normal limits allowing for magnification effect of AP   projection. The visualized osseus structures are intact.     IMPRESSION:    Bilateral pleural effusions.    CT Chest, abd, pelvis:  Findings:  The liver is of normal contour. No evidence of focal hepatic lesion on   this nonenhanced examination. The gallbladder is present. No evidence of   intra or extrahepatic biliary dilatation. Calcified gallstones are seen   in the gallbladder    The pancreas, spleen, adrenal glands, and kidneys are grossly   unremarkable. There is no evidence of hydronephrosis or perinephric   stranding. No evidence of  nephrolithiasis.The bladder is unremarkable.    No evidence of lymphadenopathy utilizing CT size criteria. The aorta is   not aneurysmal. There is significant atherosclerotic calcification of the   abdominal aorta and its branches.    There is no evidence of bowel obstruction. There is no evidence of   pneumoperitoneum or free fluid. There is diverticulosis without   diverticulitis    The visualized osseous structures demonstrate osteopenia and degenerative   changes of the spine. There is ankylosis of multiple thoracic vertebral   bodies anteriorly. There is T11 moderate compression fracture and mild   bullous of height of T12.    IMPRESSION: Small bilateral pleural effusions.  Calcified granulomata in the right lung consistent with old granulomatous   disease.  Scarring in the lung apices  No acute pathology in the abdomen or pelvis.  Cholelithiasis without CT evidence of cholecystitis  Diverticulosis without diverticulitis.   Age indeterminant compression fractures of T11 and T12.    < from: US Gallbladder (04.04.19 @ 16:18) >    IMPRESSION:     Distended gallbladder with cholelithiasis.     No gallbladder wall thickening or pericholecystic fluid.    Common bile duct mildly dilated measuring 8 mm, however is within normal   limits for caliber for age.    If there is concern for acute cholecystitis, a HIDA scan could be   obtained for further evaluation and if there is concern for a common bile   duct stone, a noncontrast MRI/MRCP could be obtained.    < end of copied text >

## 2019-04-04 NOTE — ED ADULT NURSE NOTE - OBJECTIVE STATEMENT
Patient AOX3 Patient AOX3, reliable historian, daughter at bedside. Reports nausea and vomiting since this morning. Patient had decreased eating and drinking over the past few days and was feeling generally ill. Patient reports abdominal pain, pressure, and squeezing. Denies hematuria, blood in stool. No signs of abdominal distention currently. Currently, patient vomiting, pale.

## 2019-04-05 DIAGNOSIS — I10 ESSENTIAL (PRIMARY) HYPERTENSION: ICD-10-CM

## 2019-04-05 DIAGNOSIS — E03.9 HYPOTHYROIDISM, UNSPECIFIED: ICD-10-CM

## 2019-04-05 DIAGNOSIS — N17.9 ACUTE KIDNEY FAILURE, UNSPECIFIED: ICD-10-CM

## 2019-04-05 DIAGNOSIS — K85.10 BILIARY ACUTE PANCREATITIS WITHOUT NECROSIS OR INFECTION: ICD-10-CM

## 2019-04-05 LAB
ALBUMIN SERPL ELPH-MCNC: 3.1 G/DL — LOW (ref 3.3–5.2)
ALP SERPL-CCNC: 176 U/L — HIGH (ref 40–120)
ALT FLD-CCNC: 145 U/L — HIGH
AMYLASE P1 CFR SERPL: 3070 U/L — HIGH (ref 36–128)
ANION GAP SERPL CALC-SCNC: 11 MMOL/L — SIGNIFICANT CHANGE UP (ref 5–17)
AST SERPL-CCNC: 210 U/L — HIGH
BACTERIA # UR AUTO: ABNORMAL
BASOPHILS # BLD AUTO: 0 K/UL — SIGNIFICANT CHANGE UP (ref 0–0.2)
BASOPHILS NFR BLD AUTO: 0.2 % — SIGNIFICANT CHANGE UP (ref 0–2)
BILIRUB SERPL-MCNC: 0.5 MG/DL — SIGNIFICANT CHANGE UP (ref 0.4–2)
BUN SERPL-MCNC: 32 MG/DL — HIGH (ref 8–20)
CALCIUM SERPL-MCNC: 8.6 MG/DL — SIGNIFICANT CHANGE UP (ref 8.6–10.2)
CHLORIDE SERPL-SCNC: 114 MMOL/L — HIGH (ref 98–107)
CO2 SERPL-SCNC: 20 MMOL/L — LOW (ref 22–29)
CREAT SERPL-MCNC: 1.87 MG/DL — HIGH (ref 0.5–1.3)
EOSINOPHIL # BLD AUTO: 0 K/UL — SIGNIFICANT CHANGE UP (ref 0–0.5)
EOSINOPHIL NFR BLD AUTO: 0.9 % — SIGNIFICANT CHANGE UP (ref 0–6)
EPI CELLS # UR: NEGATIVE — SIGNIFICANT CHANGE UP
FOLATE SERPL-MCNC: 6.1 NG/ML — SIGNIFICANT CHANGE UP
GLUCOSE BLDC GLUCOMTR-MCNC: 85 MG/DL — SIGNIFICANT CHANGE UP (ref 70–99)
GLUCOSE SERPL-MCNC: 137 MG/DL — HIGH (ref 70–115)
HBA1C BLD-MCNC: 5.3 % — SIGNIFICANT CHANGE UP (ref 4–5.6)
HCT VFR BLD CALC: 25.3 % — LOW (ref 37–47)
HGB BLD-MCNC: 8.2 G/DL — LOW (ref 12–16)
INR BLD: 0.94 RATIO — SIGNIFICANT CHANGE UP (ref 0.88–1.16)
LDH SERPL L TO P-CCNC: 289 U/L — HIGH (ref 98–192)
LIDOCAIN IGE QN: >3000 U/L — SIGNIFICANT CHANGE UP (ref 22–51)
LYMPHOCYTES # BLD AUTO: 0.8 K/UL — LOW (ref 1–4.8)
LYMPHOCYTES # BLD AUTO: 18.3 % — LOW (ref 20–55)
MAGNESIUM SERPL-MCNC: 2.1 MG/DL — SIGNIFICANT CHANGE UP (ref 1.8–2.6)
MCHC RBC-ENTMCNC: 32.4 G/DL — SIGNIFICANT CHANGE UP (ref 32–36)
MCHC RBC-ENTMCNC: 32.9 PG — HIGH (ref 27–31)
MCV RBC AUTO: 101.6 FL — HIGH (ref 81–99)
MONOCYTES # BLD AUTO: 0.5 K/UL — SIGNIFICANT CHANGE UP (ref 0–0.8)
MONOCYTES NFR BLD AUTO: 11.4 % — HIGH (ref 3–10)
NEUTROPHILS # BLD AUTO: 3.2 K/UL — SIGNIFICANT CHANGE UP (ref 1.8–8)
NEUTROPHILS NFR BLD AUTO: 69 % — SIGNIFICANT CHANGE UP (ref 37–73)
PHOSPHATE SERPL-MCNC: 3.4 MG/DL — SIGNIFICANT CHANGE UP (ref 2.4–4.7)
PLATELET # BLD AUTO: 152 K/UL — SIGNIFICANT CHANGE UP (ref 150–400)
POTASSIUM SERPL-MCNC: 3.9 MMOL/L — SIGNIFICANT CHANGE UP (ref 3.5–5.3)
POTASSIUM SERPL-SCNC: 3.9 MMOL/L — SIGNIFICANT CHANGE UP (ref 3.5–5.3)
PROT SERPL-MCNC: 5.7 G/DL — LOW (ref 6.6–8.7)
PROTHROM AB SERPL-ACNC: 10.8 SEC — SIGNIFICANT CHANGE UP (ref 10–12.9)
RBC # BLD: 2.49 M/UL — LOW (ref 4.4–5.2)
RBC # FLD: 16 % — HIGH (ref 11–15.6)
RBC CASTS # UR COMP ASSIST: SIGNIFICANT CHANGE UP /HPF (ref 0–4)
SODIUM SERPL-SCNC: 145 MMOL/L — SIGNIFICANT CHANGE UP (ref 135–145)
VIT B12 SERPL-MCNC: 824 PG/ML — SIGNIFICANT CHANGE UP (ref 232–1245)
WBC # BLD: 4.6 K/UL — LOW (ref 4.8–10.8)
WBC # FLD AUTO: 4.6 K/UL — LOW (ref 4.8–10.8)
WBC UR QL: SIGNIFICANT CHANGE UP

## 2019-04-05 PROCEDURE — 99222 1ST HOSP IP/OBS MODERATE 55: CPT

## 2019-04-05 PROCEDURE — 99497 ADVNCD CARE PLAN 30 MIN: CPT | Mod: 25

## 2019-04-05 PROCEDURE — 99223 1ST HOSP IP/OBS HIGH 75: CPT

## 2019-04-05 PROCEDURE — 99233 SBSQ HOSP IP/OBS HIGH 50: CPT

## 2019-04-05 RX ORDER — PANTOPRAZOLE SODIUM 20 MG/1
40 TABLET, DELAYED RELEASE ORAL
Qty: 0 | Refills: 0 | Status: DISCONTINUED | OUTPATIENT
Start: 2019-04-05 | End: 2019-04-10

## 2019-04-05 RX ORDER — SENNA PLUS 8.6 MG/1
2 TABLET ORAL AT BEDTIME
Qty: 0 | Refills: 0 | Status: DISCONTINUED | OUTPATIENT
Start: 2019-04-05 | End: 2019-04-10

## 2019-04-05 RX ORDER — SODIUM CHLORIDE 9 MG/ML
1000 INJECTION, SOLUTION INTRAVENOUS
Qty: 0 | Refills: 0 | Status: DISCONTINUED | OUTPATIENT
Start: 2019-04-05 | End: 2019-04-06

## 2019-04-05 RX ORDER — SODIUM CHLORIDE 9 MG/ML
1000 INJECTION, SOLUTION INTRAVENOUS
Qty: 0 | Refills: 0 | Status: DISCONTINUED | OUTPATIENT
Start: 2019-04-05 | End: 2019-04-05

## 2019-04-05 RX ORDER — DOCUSATE SODIUM 100 MG
100 CAPSULE ORAL
Qty: 0 | Refills: 0 | Status: DISCONTINUED | OUTPATIENT
Start: 2019-04-05 | End: 2019-04-10

## 2019-04-05 RX ADMIN — Medication 25 MICROGRAM(S): at 06:13

## 2019-04-05 RX ADMIN — Medication 81 MILLIGRAM(S): at 12:24

## 2019-04-05 RX ADMIN — Medication 1 MILLIGRAM(S): at 12:24

## 2019-04-05 RX ADMIN — Medication 30 MILLIGRAM(S): at 06:13

## 2019-04-05 RX ADMIN — SENNA PLUS 2 TABLET(S): 8.6 TABLET ORAL at 23:29

## 2019-04-05 RX ADMIN — PANTOPRAZOLE SODIUM 40 MILLIGRAM(S): 20 TABLET, DELAYED RELEASE ORAL at 12:24

## 2019-04-05 RX ADMIN — FENTANYL CITRATE 1 PATCH: 50 INJECTION INTRAVENOUS at 08:13

## 2019-04-05 RX ADMIN — PREGABALIN 1000 MICROGRAM(S): 225 CAPSULE ORAL at 17:07

## 2019-04-05 RX ADMIN — SODIUM CHLORIDE 100 MILLILITER(S): 9 INJECTION, SOLUTION INTRAVENOUS at 01:15

## 2019-04-05 NOTE — CONSULT NOTE ADULT - SUBJECTIVE AND OBJECTIVE BOX
Patient is a 89y old  Female who presents with a chief complaint of acute pancreatitis (04 Apr 2019 21:20)      HPI:  89 yo F with PMH of HTN, Hypothyroid, Hx of Rt Breast CA s/p lumpectomy and then radiation as well as glaucoma who presents to ED from home with c/o  the onset of mid abdominal pain on the morning of admission. The pain was constant and radiated to the back and chest and was described as an intense ache and pressure with recurrant nausea and vomiting. She came to the ER hwere a sono showed gallstones and a lipase was greater than 3000 with elevated LFTs. A cat scan of the chest/abd/ pevis with no contrast showed atherosclerosis and bilateral pleural effusions as well as gallstone but the liver and pancreas appeared normal. This morning she is symptom free with no abdominal pain, nausea and vomiting and tolerating liquids. She lost her sense of tast about two months ago with a decrease in her appetite nd some weight loss of unknown amount. Denies prior abdominal pain to me. She has chronic constipation without change for which she takes prune juice. No prior hx of PUD or pancreatitis. No alcohol use or recent trauma. Lives with her daughter. She denies any rectal bleeding or melena. There is no fever.      REVIEW OF SYSTEMS:    CONSTITUTIONAL: No fever, weight loss, or fatigue  EYES: No eye pain, visual disturbances, or discharge  ENMT:  No difficulty hearing, tinnitus, vertigo; No sinus or throat pain  NECK: No pain or stiffness  RESPIRATORY: No cough, wheezing, chills or hemoptysis; No shortness of breath  CARDIOVASCULAR: No chest pain, palpitations, dizziness, or leg swelling  GASTROINTESTINAL: as above  NEUROLOGICAL: No headaches, memory loss, loss of strength, numbness, or tremors  SKIN: No itching, burning, rashes, or lesions   LYMPH NODES: No enlarged glands  MUSCULOSKELETAL: No joint pain or swelling; No muscle, back, or extremity pain  PSYCHIATRIC: No depression, anxiety, mood swings, or difficulty sleeping  HEME/LYMPH: No easy bruising, or bleeding gums  ALLERY AND IMMUNOLOGIC: No hives or eczema      PAST MEDICAL & SURGICAL HISTORY:  Glaucoma  HTN (hypertension)  Breast cancer: s/p RT lumpectomy and radiation  Hypothyroid  S/P cataract extraction: bilateral  S/P lumpectomy, right breast      FAMILY HISTORY:  Family history of brain cancer: Brother  Family history of uterine cancer: Sister - cervical/uterine  Family hx of lung cancer: 2 Brothers      SOCIAL HISTORY:  Smoking Status: [ ] Current, [x ] Former, [ ] Never-quit cigs many years ago  Pack Years:  Alcohol Use: rare to none    Home Medications:  aspirin: 81 milligram(s) orally once a day (04 Apr 2019 21:31)  Cosopt:  to each affected eye 2 times a day (04 Apr 2019 21:31)  isosorbide mononitrate 30 mg oral tablet, extended release: 1 tab(s) orally once a day (04 Apr 2019 21:31)  Pravachol: 20 milligram(s) orally once a day (at bedtime) (04 Apr 2019 21:31)  Synthroid 25 mcg (0.025 mg) oral tablet: 1 tab(s) orally once a day Mon-Fri  2 tabs orally once a day Sat-Sun (04 Apr 2019 21:31)  Vitamin D2 50,000 intl units (1.25 mg) oral capsule: 1 cap(s) orally once a month (04 Apr 2019 21:31)      MEDICATIONS:  MEDICATIONS  (STANDING):  aspirin enteric coated 81 milliGRAM(s) Oral daily  atorvastatin 10 milliGRAM(s) Oral at bedtime  cyanocobalamin 1000 MICROGram(s) Oral daily  dextrose 5% + sodium chloride 0.9%. 1000 milliLiter(s) (100 mL/Hr) IV Continuous <Continuous>  fentaNYL   Patch  25 MICROgram(s)/Hr 1 Patch Transdermal every 72 hours  folic acid 1 milliGRAM(s) Oral daily  levothyroxine 25 MICROGram(s) Oral daily  NIFEdipine XL 30 milliGRAM(s) Oral daily    MEDICATIONS  (PRN):  ondansetron Injectable 4 milliGRAM(s) IV Push every 6 hours PRN Nausea and/or Vomiting      Allergies    codeine (Vomiting)  penicillin (Rash)  Sulfacetamide Sodium (Rash)    Intolerances        Vital Signs Last 24 Hrs  T(C): 36 (05 Apr 2019 01:00), Max: 36.9 (04 Apr 2019 12:01)  T(F): 96.8 (05 Apr 2019 01:00), Max: 98.5 (04 Apr 2019 12:01)  HR: 76 (05 Apr 2019 06:09) (75 - 84)  BP: 152/78 (05 Apr 2019 06:09) (152/78 - 181/81)  BP(mean): --  RR: 18 (05 Apr 2019 01:00) (16 - 20)  SpO2: 99% (05 Apr 2019 01:00) (95% - 99%)    04-04 @ 07:01  -  04-05 @ 07:00  --------------------------------------------------------  IN: 560 mL / OUT: 0 mL / NET: 560 mL          PHYSICAL EXAM:    General: thin white female in no acute distress, diminished hearing noted  HEENT: MMM, conjunctiva and sclera clear, bilateral arcus and bilateral dentures  Lungs: Clear but with slightly decreased BS  Heart: Rhythm Regular, 2-3/6 systolic murmur at apex  Gastrointestinal: Soft, non-tender non-distended; Normal bowel sounds; No rebound or guarding; No Organomegaly & No Masses  Extremities: Normal range of motion, No clubbing, cyanosis or edema  Neurological: Alert and oriented x3, Non-focal  Skin: Warm and dry. No obvious rash  Rectal: No Masses, small amount of firm brown stool      LABS:                        10.7   11.2  )-----------( 209      ( 04 Apr 2019 15:04 )             32.7     04-04    144  |  106  |  37.0<H>  ----------------------------<  141<H>  4.5   |  22.0  |  2.18<H>    Ca    10.2      04 Apr 2019 15:04    TPro  8.0  /  Alb  4.3  /  TBili  1.5  /  DBili  x   /  AST  478<H>  /  ALT  188<H>  /  AlkPhos  249<H>  04-04    Lipase, Serum: >3000 U/L (04.04.19 @ 15:04)              RADIOLOGY & ADDITIONAL STUDIES:  < from: CT Abdomen and Pelvis No Cont (04.04.19 @ 17:38) >   EXAM:  CT CHEST                         EXAM:  CT ABDOMEN AND PELVIS                          PROCEDURE DATE:  04/04/2019          INTERPRETATION:  CT CHEST/ABDOMEN/PELVIS:     CLINICAL INFORMATION: Abdominal pain, nausea, hypertension, chest pain    COMPARISON: CT abdomen and pelvis of 7/15/2012.    PROCEDURE:  Using multislice helical CT, 3.0 mm sections were obtained   from the thoracic inlet through the ischial tuberosities without the   administration of intravenous contrast. Oral contrast was not   administered.    Coronal and sagittal reformations were performed by  CT technologist and   made available for review.    FINDINGS:  The visualized structures of the lower neck are unremarkable.   The visualized aorta is of normal course and caliber. The heart is not   enlarged. There is no evidence of pericardial effusion. There is   atherosclerotic calcification of the aorta. There is tracheobronchial   calcification. Calcified adenomata are seen in the superior segment of   the right lower lobe. There are small bilateral pleural effusions. There   is coronary artery calcification.    There is no evidence of axillary, hilar, or mediastinal lymphadenopathy.    No focal lung consolidations identified. No evidence of pneumothorax. No   pulmonary nodules identified. There is a moderate degree of scarring in   the lung apices with calcification in the left lung apex. There is mild   passive atelectasis at the lung bases.      The liver is of normal contour. No evidence of focal hepatic lesion on   this nonenhanced examination. The gallbladder is present. No evidence of   intra or extrahepatic biliary dilatation. Calcified gallstones are seen   in the gallbladder    The pancreas, spleen, adrenal glands, and kidneys are grossly   unremarkable. There is no evidence of hydronephrosis or perinephric   stranding. No evidence of  nephrolithiasis.The bladder is unremarkable.    No evidence of lymphadenopathy utilizing CT size criteria. The aorta is   not aneurysmal. There is significant atherosclerotic calcification of the   abdominal aorta and its branches.    There is no evidence of bowel obstruction. There is no evidence of   pneumoperitoneum or free fluid. There is diverticulosis without   diverticulitis    The visualized osseous structures demonstrate osteopenia and degenerative   changes of the spine. There is ankylosis of multiple thoracic vertebral   bodies anteriorly. There is T11 moderate compression fracture and mild   bullous of height of T12.    IMPRESSION: Small bilateral pleural effusions.  Calcified granulomata in the right lung consistent with old granulomatous   disease.  Scarring in the lung apices  No acute pathology in the abdomen or pelvis.  Cholelithiasis without CT evidence of cholecystitis  Diverticulosis without diverticulitis.   Age indeterminant compression fractures of T11 and T12.                BRIAN NOONAN M.D.,ATTENDING RADIOLOGIST  This document has been electronically signed. Apr 4 2019  5:47PM                  < end of copied text >  < from: US Gallbladder (04.04.19 @ 16:18) >   EXAM:  US GALLBLADDER                          PROCEDURE DATE:  04/04/2019          INTERPRETATION:  CLINICAL INFORMATION: Right upper quadrant pain.   Evaluate for cholecystitis.    COMPARISON: CT abdomen/pelvis 7/15/2012    TECHNIQUE: Sonographyof the abdomen.     FINDINGS:    There is cholelithiasis. Distended, however no gallbladder wall   thickening or pericholecystic fluid. Patient on pain medication and a   sonographic Douglass sign could not be assessed.     Common bile duct measures 8 mm which is within normal limits for caliber   for age.    IMPRESSION:     Distended gallbladder with cholelithiasis.     No gallbladder wall thickening or pericholecystic fluid.    Common bile duct mildly dilated measuring 8 mm, however is within normal   limits for caliber for age.    If there is concern for acute cholecystitis, a HIDA scan could be   obtained for further evaluation and if there is concern for a common bile   duct stone, a noncontrast MRI/MRCP could be obtained.                CAROLYN HUA   This document has been electronically signed. Apr 4 2019  4:30PM                  < end of copied text >

## 2019-04-05 NOTE — CONSULT NOTE ADULT - PROBLEM SELECTOR RECOMMENDATION 9
probably mild and resolving quickly as the pancreas appeared normal on initial cat scan althoughit was done with no oral or IV contrast. However she is already pain free. Will continue IV NS at 100ml/hr as her renal function is somewhat impaired and I advise clear liquids for one more day. Will recheck lipase and get amylase as well. Will also order MRI/MRCP. Moniter BS as well as lytes and renal function. Suggest surgical consult for eventual cholecystectomy.

## 2019-04-05 NOTE — PROGRESS NOTE ADULT - SUBJECTIVE AND OBJECTIVE BOX
INTERVAL HPI/OVERNIGHT EVENTS:    CC:     Chart reviewed, feels better. No more pain, denies nausea.    Vital Signs Last 24 Hrs  T(C): 36.6 (2019 08:44), Max: 36.6 (2019 08:44)  T(F): 97.8 (2019 08:44), Max: 97.8 (2019 08:44)  HR: 81 (2019 08:44) (75 - 84)  BP: 130/63 (2019 08:44) (130/63 - 180/70)  BP(mean): --  RR: 18 (2019 08:44) (16 - 20)  SpO2: 99% (2019 01:00) (96% - 99%)    PHYSICAL EXAM:    GENERAL: Not in distress, alert  CHEST/LUNG: b/l air entry, clear  HEART: Regular   ABDOMEN: Soft, BS+, non tender  EXTREMITIES:  no edema, tenderness    MEDICATIONS  (STANDING):  aspirin enteric coated 81 milliGRAM(s) Oral daily  atorvastatin 10 milliGRAM(s) Oral at bedtime  cyanocobalamin 1000 MICROGram(s) Oral daily  dextrose 5% + sodium chloride 0.9%. 1000 milliLiter(s) (100 mL/Hr) IV Continuous <Continuous>  fentaNYL   Patch  25 MICROgram(s)/Hr 1 Patch Transdermal every 72 hours  folic acid 1 milliGRAM(s) Oral daily  levothyroxine 25 MICROGram(s) Oral daily  NIFEdipine XL 30 milliGRAM(s) Oral daily  pantoprazole    Tablet 40 milliGRAM(s) Oral before breakfast    MEDICATIONS  (PRN):  ondansetron Injectable 4 milliGRAM(s) IV Push every 6 hours PRN Nausea and/or Vomiting      Allergies    codeine (Vomiting)  penicillin (Rash)  Sulfacetamide Sodium (Rash)    Intolerances          LABS:                          8.2    4.6   )-----------( 152      ( 2019 09:36 )             25.3     04-05    145  |  114<H>  |  32.0<H>  ----------------------------<  137<H>  3.9   |  20.0<L>  |  1.87<H>    Ca    8.6      2019 09:36  Phos  3.4     04-05  Mg     2.1     04-05    TPro  5.7<L>  /  Alb  3.1<L>  /  TBili  0.5  /  DBili  x   /  AST  210<H>  /  ALT  145<H>  /  AlkPhos  176<H>      PT/INR - ( 2019 09:36 )   PT: 10.8 sec;   INR: 0.94 ratio         PTT - ( 2019 15:04 )  PTT:37.2 sec  Urinalysis Basic - ( 2019 23:47 )    Color: Yellow / Appearance: Clear / S.010 / pH: x  Gluc: x / Ketone: Negative  / Bili: Negative / Urobili: Negative mg/dL   Blood: x / Protein: 100 mg/dL / Nitrite: Negative   Leuk Esterase: Negative / RBC: 0-2 /HPF / WBC 0-2   Sq Epi: x / Non Sq Epi: Negative / Bacteria: Occasional        RADIOLOGY & ADDITIONAL TESTS:

## 2019-04-05 NOTE — PROGRESS NOTE ADULT - ATTENDING COMMENTS
Discussed with patient at bedside. Discussed with patient at bedside.  Updated family at bedside. Patient continues to decline surgical evaluation.

## 2019-04-05 NOTE — CONSULT NOTE ADULT - ASSESSMENT
Mrs. Figueroa is a 89 year old female admitted for acute pancreatitis and NAVIN.     PLAN    Acute pancreatitis  - elevated amylase, lipase and US with distended GB with cholelithiasis  - c/w IVF, clear liquid diet, zofran PRN N/V, pending MRCP, plan per GI    Acute Pain  - stable  - c/w TD fentanyl 25 mcg q72H  - if pt should develop worsening pain, suggest using PO dilaudid 1 mg q4H PRN mild or moderate pain for breakthrough in setting of transaminitis and NAVIN  - will add senna and colace for constipation prophylaxis    NAVIN  - avoid nephrotoxic agents. c/w IVF and serial Cr    Palliative Care Encounter  Met with pt and her family to introduce role and scope of palliative. Pt is oriented x3, appears to have insight into her medical conditions and hospital course. Awaiting further workup of upcoming MRCP to determine next course of action. Advance directives were briefly introduced, family remain undecided as pt has never vocalized her wishes in past conversation, they desire for guidance from pt. Son appear guarded initially about this subject, did not feel it was something that needed to be addressed presently. Reassurance provided and emphasized importance of ongoing discussion on these difficult topics. Plan to follow with pt next week to continue discussions on ADs, family agreeable.

## 2019-04-05 NOTE — PROGRESS NOTE ADULT - ASSESSMENT
89 yr old female with hypertension, hypothyroid, breast cancer admitted with complaints of abdominal pain, nausea, vomiting. Noted to have elevated Lipase, imaging negative for pancreatitis and US abdomen noted to have cholelithiases. She was placed on IVF, pain control initiated. GI consulted. Also noted to have NAVIN. MRI/MRCP ordered by GI. Clear liquid diet started.

## 2019-04-05 NOTE — CONSULT NOTE ADULT - SUBJECTIVE AND OBJECTIVE BOX
PALLIATIVE CONSULT    CC: Patient is a 89y old  Female who presents with a chief complaint of acute pancreatitis (2019 13:30)    HPI:  Mrs. Figueroa is a 89 year old female with PMHx of HTN, Rt breat CA s/p lumpectomy/RT, glaucoma and hard of hearing admitted for abdominal pain, nausea and emesis. She was found to have elevated amylase, lipase, transaminitis and US with distended GB and cholelithiases dilated CBD but wnl. She is currently being treated for acute pancreatitis. Palliative consulted for support, assist with advance directives/goc. Seen by GI, pending MRCP.    Pt seen and examined at bedside this afternoon. Daughter, son and MARTHA present. Pt is resting comfortably, reports pain and nausea improved since admission. She offer no acute complaints.     Present Symptoms:     Dyspnea:  No   Nausea/Vomiting: Yes , improved  Anxiety:   No  Depression:  No  Fatigue: Yes    Loss of appetite: currently on clears, she does feel hungry  Pain: stable with current regimen          Review of Systems: As per HPI.  All others negative    PERTINENT PMH REVIEWED: Yes     PAST MEDICAL & SURGICAL HISTORY:  Glaucoma  HTN (hypertension)  Breast cancer: s/p RT lumpectomy and radiation  Hypothyroid  S/P cataract extraction: bilateral  S/P lumpectomy, right breast      SOCIAL HISTORY:    Admitted from:  home, lives with daughter Morena    Baseline ADLs (prior to admission): mostly Independent    Karnofsky: 60 %    Surrogate/HCP/Guardian: Phone#:  - next of kin is Morena Romero [phone]    ADVANCE DIRECTIVES:   DNR YES NO  Completed on:                     MOLST  YES NO   Completed on:  Living Will  YES NO   Completed on:    FAMILY HISTORY:  Family history of brain cancer: Brother  Family history of uterine cancer: Sister - cervical/uterine  Family hx of lung cancer: 2 Brothers       Allergies    codeine (Vomiting)  penicillin (Rash)  Sulfacetamide Sodium (Rash)    Intolerances    MEDICATIONS  (STANDING):  aspirin enteric coated 81 milliGRAM(s) Oral daily  cyanocobalamin 1000 MICROGram(s) Oral daily  dextrose 5% + sodium chloride 0.9%. 1000 milliLiter(s) (100 mL/Hr) IV Continuous <Continuous>  fentaNYL   Patch  25 MICROgram(s)/Hr 1 Patch Transdermal every 72 hours  folic acid 1 milliGRAM(s) Oral daily  levothyroxine 25 MICROGram(s) Oral daily  NIFEdipine XL 30 milliGRAM(s) Oral daily  pantoprazole    Tablet 40 milliGRAM(s) Oral before breakfast    MEDICATIONS  (PRN):  ondansetron Injectable 4 milliGRAM(s) IV Push every 6 hours PRN Nausea and/or Vomiting      PHYSICAL EXAM:    Vital Signs Last 24 Hrs  T(C): 36.6 (2019 15:27), Max: 36.6 (2019 08:44)  T(F): 97.9 (2019 15:27), Max: 97.9 (2019 15:27)  HR: 77 (2019 15:) (75 - 84)  BP: 147/63 (2019 15:) (130/63 - 176/84)  BP(mean): --  RR: 18 (2019 15:27) (17 - 20)  SpO2: 99% (2019 01:00) (96% - 99%)    General: elderly. Resting comfortably. No acute distress.   HEENT: mucous membrane moist    Lungs: clear to auscultation bilaterally. no rales, rhonchi, wheezing. non-labored.    CV: +s1/s2. Regular rate and rhythm.  +murmur  GI:+ bowel sound. abdomen soft, non-tender, non-distended.  MSK: Moves all 4 extremities.  No cyanosis or edema. +weakness.   Neuro: Awake and alert, Ox4 (knows what brought her to hospital). Interactive.  Skin: warm and dry.      LABS:                        8.2    4.6   )-----------( 152      ( 2019 09:36 )             25.3     04-05    145  |  114<H>  |  32.0<H>  ----------------------------<  137<H>  3.9   |  20.0<L>  |  1.87<H>    Ca    8.6      2019 09:36  Phos  3.4     04-05  Mg     2.1     04-05    TPro  5.7<L>  /  Alb  3.1<L>  /  TBili  0.5  /  DBili  x   /  AST  210<H>  /  ALT  145<H>  /  AlkPhos  176<H>  04-05    PT/INR - ( 2019 09:36 )   PT: 10.8 sec;   INR: 0.94 ratio         PTT - ( 2019 15:04 )  PTT:37.2 sec  Urinalysis Basic - ( 2019 23:47 )    Color: Yellow / Appearance: Clear / S.010 / pH: x  Gluc: x / Ketone: Negative  / Bili: Negative / Urobili: Negative mg/dL   Blood: x / Protein: 100 mg/dL / Nitrite: Negative   Leuk Esterase: Negative / RBC: 0-2 /HPF / WBC 0-2   Sq Epi: x / Non Sq Epi: Negative / Bacteria: Occasional      I&O's Summary    2019 07:01  -  2019 07:00  --------------------------------------------------------  IN: 560 mL / OUT: 0 mL / NET: 560 mL    RADIOLOGY & ADDITIONAL STUDIES:     CT C/A/P: 19  IMPRESSION: Small bilateral pleural effusions.  Calcified granulomata in the right lung consistent with old granulomatous   disease.  Scarring in the lung apices  No acute pathology in the abdomen or pelvis.  Cholelithiasis without CT evidence of cholecystitis  Diverticulosis without diverticulitis.   Age indeterminant compression fractures of T11 and T12.    US GB 19  IMPRESSION:   Distended gallbladder with cholelithiasis.   No gallbladder wall thickening or pericholecystic fluid.  Common bile duct mildly dilated measuring 8 mm, however is within normal limits for caliber for age.    AXR 19  IMPRESSION:  Bilateral pleural effusions.  No acute intra-abdominal findings.          Thank you for the opportunity to assist with the care of this patient.   [facility] [phone].

## 2019-04-05 NOTE — PROGRESS NOTE ADULT - PROBLEM SELECTOR PLAN 1
Monitor amylase, lipase, continue IVF. Clear liquid diet, MRCP ordered. Pain control, Zofran prn. Discussed with patient regarding possible surgical evaluation, stated she would not like to proceed with the same 'as I am 89.' Wants to discuss with family.

## 2019-04-06 DIAGNOSIS — I50.9 HEART FAILURE, UNSPECIFIED: ICD-10-CM

## 2019-04-06 DIAGNOSIS — Z71.89 OTHER SPECIFIED COUNSELING: ICD-10-CM

## 2019-04-06 LAB
ALBUMIN SERPL ELPH-MCNC: 3.4 G/DL — SIGNIFICANT CHANGE UP (ref 3.3–5.2)
ALP SERPL-CCNC: 175 U/L — HIGH (ref 40–120)
ALT FLD-CCNC: 121 U/L — HIGH
AMYLASE P1 CFR SERPL: 1506 U/L — HIGH (ref 36–128)
ANION GAP SERPL CALC-SCNC: 11 MMOL/L — SIGNIFICANT CHANGE UP (ref 5–17)
AST SERPL-CCNC: 106 U/L — HIGH
BILIRUB DIRECT SERPL-MCNC: 0.2 MG/DL — SIGNIFICANT CHANGE UP (ref 0–0.3)
BILIRUB INDIRECT FLD-MCNC: 0.3 MG/DL — SIGNIFICANT CHANGE UP (ref 0.2–1)
BILIRUB SERPL-MCNC: 0.5 MG/DL — SIGNIFICANT CHANGE UP (ref 0.4–2)
BUN SERPL-MCNC: 32 MG/DL — HIGH (ref 8–20)
CALCIUM SERPL-MCNC: 9 MG/DL — SIGNIFICANT CHANGE UP (ref 8.6–10.2)
CHLORIDE SERPL-SCNC: 114 MMOL/L — HIGH (ref 98–107)
CO2 SERPL-SCNC: 20 MMOL/L — LOW (ref 22–29)
CREAT SERPL-MCNC: 1.8 MG/DL — HIGH (ref 0.5–1.3)
GLUCOSE SERPL-MCNC: 84 MG/DL — SIGNIFICANT CHANGE UP (ref 70–115)
HCT VFR BLD CALC: 29.1 % — LOW (ref 37–47)
HGB BLD-MCNC: 9.3 G/DL — LOW (ref 12–16)
LIDOCAIN IGE QN: 1544 U/L — HIGH (ref 22–51)
MCHC RBC-ENTMCNC: 32 G/DL — SIGNIFICANT CHANGE UP (ref 32–36)
MCHC RBC-ENTMCNC: 33 PG — HIGH (ref 27–31)
MCV RBC AUTO: 103.2 FL — HIGH (ref 81–99)
PLATELET # BLD AUTO: 158 K/UL — SIGNIFICANT CHANGE UP (ref 150–400)
POTASSIUM SERPL-MCNC: 4.6 MMOL/L — SIGNIFICANT CHANGE UP (ref 3.5–5.3)
POTASSIUM SERPL-SCNC: 4.6 MMOL/L — SIGNIFICANT CHANGE UP (ref 3.5–5.3)
PROT SERPL-MCNC: 6.2 G/DL — LOW (ref 6.6–8.7)
RBC # BLD: 2.82 M/UL — LOW (ref 4.4–5.2)
RBC # FLD: 16.6 % — HIGH (ref 11–15.6)
SODIUM SERPL-SCNC: 145 MMOL/L — SIGNIFICANT CHANGE UP (ref 135–145)
WBC # BLD: 6.4 K/UL — SIGNIFICANT CHANGE UP (ref 4.8–10.8)
WBC # FLD AUTO: 6.4 K/UL — SIGNIFICANT CHANGE UP (ref 4.8–10.8)

## 2019-04-06 PROCEDURE — 93010 ELECTROCARDIOGRAM REPORT: CPT

## 2019-04-06 PROCEDURE — 99233 SBSQ HOSP IP/OBS HIGH 50: CPT

## 2019-04-06 PROCEDURE — 71045 X-RAY EXAM CHEST 1 VIEW: CPT | Mod: 26

## 2019-04-06 RX ORDER — FUROSEMIDE 40 MG
40 TABLET ORAL EVERY 12 HOURS
Qty: 0 | Refills: 0 | Status: DISCONTINUED | OUTPATIENT
Start: 2019-04-06 | End: 2019-04-08

## 2019-04-06 RX ORDER — HYDRALAZINE HCL 50 MG
25 TABLET ORAL ONCE
Qty: 0 | Refills: 0 | Status: COMPLETED | OUTPATIENT
Start: 2019-04-06 | End: 2019-04-06

## 2019-04-06 RX ORDER — HYDRALAZINE HCL 50 MG
10 TABLET ORAL ONCE
Qty: 0 | Refills: 0 | Status: COMPLETED | OUTPATIENT
Start: 2019-04-06 | End: 2019-04-07

## 2019-04-06 RX ORDER — HEPARIN SODIUM 5000 [USP'U]/ML
5000 INJECTION INTRAVENOUS; SUBCUTANEOUS EVERY 12 HOURS
Qty: 0 | Refills: 0 | Status: DISCONTINUED | OUTPATIENT
Start: 2019-04-06 | End: 2019-04-10

## 2019-04-06 RX ADMIN — FENTANYL CITRATE 1 PATCH: 50 INJECTION INTRAVENOUS at 19:00

## 2019-04-06 RX ADMIN — Medication 1 MILLIGRAM(S): at 11:11

## 2019-04-06 RX ADMIN — FENTANYL CITRATE 1 PATCH: 50 INJECTION INTRAVENOUS at 15:52

## 2019-04-06 RX ADMIN — Medication 25 MICROGRAM(S): at 05:27

## 2019-04-06 RX ADMIN — Medication 100 MILLIGRAM(S): at 17:30

## 2019-04-06 RX ADMIN — PREGABALIN 1000 MICROGRAM(S): 225 CAPSULE ORAL at 11:11

## 2019-04-06 RX ADMIN — SODIUM CHLORIDE 100 MILLILITER(S): 9 INJECTION, SOLUTION INTRAVENOUS at 05:28

## 2019-04-06 RX ADMIN — SENNA PLUS 2 TABLET(S): 8.6 TABLET ORAL at 23:04

## 2019-04-06 RX ADMIN — Medication 30 MILLIGRAM(S): at 05:27

## 2019-04-06 RX ADMIN — PANTOPRAZOLE SODIUM 40 MILLIGRAM(S): 20 TABLET, DELAYED RELEASE ORAL at 05:28

## 2019-04-06 RX ADMIN — Medication 25 MILLIGRAM(S): at 09:59

## 2019-04-06 RX ADMIN — Medication 81 MILLIGRAM(S): at 11:11

## 2019-04-06 RX ADMIN — Medication 40 MILLIGRAM(S): at 14:52

## 2019-04-06 RX ADMIN — Medication 100 MILLIGRAM(S): at 05:27

## 2019-04-06 RX ADMIN — HEPARIN SODIUM 5000 UNIT(S): 5000 INJECTION INTRAVENOUS; SUBCUTANEOUS at 17:30

## 2019-04-06 NOTE — PROGRESS NOTE ADULT - PROBLEM SELECTOR PLAN 1
Mild by CT criteria with no discernible inflammation but this was a non IV contrast study. Clinically improved with decreasing amylase and lipase and abdominal pain. Awaiting MRI of abdomen and MRCP. Continue clear liquid diet and current IVF and rate. Repeat labs ordered for tomorrow AM. Mild by CT criteria with no discernible inflammation but this was a non IV contrast study due to pt's elevated BUN and creatinine. Clinically improved with decreasing amylase and lipase and abdominal pain. Awaiting  Non IV contrast MRI of abdomen and MRCP. Continue clear liquid diet and current IVF and rate. Repeat labs ordered for tomorrow AM.

## 2019-04-06 NOTE — PROGRESS NOTE ADULT - SUBJECTIVE AND OBJECTIVE BOX
Pt seen and examined. She states that she is feeling somewhat better with slightly less upper abdominal pain.    REVIEW OF SYSTEMS:  Constitutional: No fever, weight loss or fatigue  Cardiovascular: No chest pain, palpitations, dizziness or leg swelling  Gastrointestinal: As noted above. No N/V  Skin: No itching, burning, rashes or lesions       MEDICATIONS:  MEDICATIONS  (STANDING):  aspirin enteric coated 81 milliGRAM(s) Oral daily  cyanocobalamin 1000 MICROGram(s) Oral daily  dextrose 5% + sodium chloride 0.9%. 1000 milliLiter(s) (100 mL/Hr) IV Continuous <Continuous>  docusate sodium 100 milliGRAM(s) Oral two times a day  fentaNYL   Patch  25 MICROgram(s)/Hr 1 Patch Transdermal every 72 hours  folic acid 1 milliGRAM(s) Oral daily  levothyroxine 25 MICROGram(s) Oral daily  NIFEdipine XL 30 milliGRAM(s) Oral daily  pantoprazole    Tablet 40 milliGRAM(s) Oral before breakfast  senna 2 Tablet(s) Oral at bedtime    MEDICATIONS  (PRN):  ondansetron Injectable 4 milliGRAM(s) IV Push every 6 hours PRN Nausea and/or Vomiting      Allergies    codeine (Vomiting)  penicillin (Rash)  Sulfacetamide Sodium (Rash)    Intolerances        Vital Signs Last 24 Hrs  T(C): 36.7 (2019 09:07), Max: 37.1 (2019 23:33)  T(F): 98 (2019 09:07), Max: 98.8 (2019 23:33)  HR: 84 (2019 09:30) (77 - 86)  BP: 190/60 (2019 09:30) (130/67 - 190/60)  BP(mean): --  RR: 16 (2019 09:30) (16 - 20)  SpO2: 93% (2019 09:30) (79% - 94%)      PHYSICAL EXAM:    General: Well developed; well nourished; in no acute distress  HEENT: MMM, conjunctiva pink and sclera anicteric.  Lungs: clear to auscultation bilaterally.  Cor: RRR S1, S2 only.  Gastrointestinal: Abdomen: Soft moderate epigastric tenderness improved from yesterday non-distended; Normal bowel sounds; No hepatosplenomegaly  Extremities: no cyanosis, clubbing or edema.  Skin: Warm and dry. No obvious rash  Neuro: Pt. a + o x 3    LABS:  CBC Full  -  ( 2019 09:54 )  WBC Count : 6.4 K/uL  RBC Count : 2.82 M/uL  Hemoglobin : 9.3 g/dL  Hematocrit : 29.1 %  Platelet Count - Automated : 158 K/uL  Mean Cell Volume : 103.2 fl  Mean Cell Hemoglobin : 33.0 pg  Mean Cell Hemoglobin Concentration : 32.0 g/dL  Auto Neutrophil # : x  Auto Lymphocyte # : x  Auto Monocyte # : x  Auto Eosinophil # : x  Auto Basophil # : x  Auto Neutrophil % : x  Auto Lymphocyte % : x  Auto Monocyte % : x  Auto Eosinophil % : x  Auto Basophil % : x        145  |  114<H>  |  32.0<H>  ----------------------------<  84  4.6   |  20.0<L>  |  1.80<H>    Ca    9.0      2019 09:54  Phos  3.4     -  Mg     2.1         TPro  6.2<L>  /  Alb  3.4  /  TBili  0.5  /  DBili  0.2  /  AST  106<H>  /  ALT  121<H>  /  AlkPhos  175<H>      PT/INR - ( 2019 09:36 )   PT: 10.8 sec;   INR: 0.94 ratio         PTT - ( 2019 15:04 )  PTT:37.2 sec      Urinalysis Basic - ( 2019 23:47 )    Color: Yellow / Appearance: Clear / S.010 / pH: x  Gluc: x / Ketone: Negative  / Bili: Negative / Urobili: Negative mg/dL   Blood: x / Protein: 100 mg/dL / Nitrite: Negative   Leuk Esterase: Negative / RBC: 0-2 /HPF / WBC 0-2   Sq Epi: x / Non Sq Epi: Negative / Bacteria: Occasional                RADIOLOGY & ADDITIONAL STUDIES (The following images were personally reviewed):

## 2019-04-06 NOTE — PROGRESS NOTE ADULT - SUBJECTIVE AND OBJECTIVE BOX
INTERVAL HPI/OVERNIGHT EVENTS:  89yFemale    Vital Signs Last 24 Hrs  T(C): 36.7 (2019 09:07), Max: 37.1 (2019 23:33)  T(F): 98 (2019 09:07), Max: 98.8 (2019 23:33)  HR: 84 (2019 09:30) (77 - 86)  BP: 158/60 (2019 11:16) (130/67 - 190/60)  BP(mean): --  RR: 16 (2019 09:30) (16 - 20)  SpO2: 93% (2019 09:30) (79% - 94%)    PHYSICAL EXAM:    GENERAL: NAD, well-groomed, well-developed  HEAD:  Atraumatic, Normocephalic  EYES: EOMI, PERRLA, conjunctiva and sclera clear  ENMT: Moist mucous membranes  NECK: Supple, No JVD  NERVOUS SYSTEM:  Alert & Oriented X3, Motor Strength 5/5 B/L upper and lower extremities; DTRs 2+ intact and symmetric  CHEST/LUNG: Clear to auscultation bilaterally; No rales, rhonchi, wheezing, or rubs  HEART: Regular rate and rhythm; No murmurs, rubs, or gallops  ABDOMEN: Soft, Nontender, Nondistended; Bowel sounds present  EXTREMITIES:  2+ Peripheral Pulses, No clubbing, cyanosis, or edema    MEDICATIONS  (STANDING):  aspirin enteric coated 81 milliGRAM(s) Oral daily  cyanocobalamin 1000 MICROGram(s) Oral daily  docusate sodium 100 milliGRAM(s) Oral two times a day  fentaNYL   Patch  25 MICROgram(s)/Hr 1 Patch Transdermal every 72 hours  folic acid 1 milliGRAM(s) Oral daily  levothyroxine 25 MICROGram(s) Oral daily  NIFEdipine XL 30 milliGRAM(s) Oral daily  pantoprazole    Tablet 40 milliGRAM(s) Oral before breakfast  senna 2 Tablet(s) Oral at bedtime    MEDICATIONS  (PRN):  ondansetron Injectable 4 milliGRAM(s) IV Push every 6 hours PRN Nausea and/or Vomiting      Allergies    codeine (Vomiting)  penicillin (Rash)  Sulfacetamide Sodium (Rash)    Intolerances          LABS:                          9.3    6.4   )-----------( 158      ( 2019 09:54 )             29.1     04-06    145  |  114<H>  |  32.0<H>  ----------------------------<  84  4.6   |  20.0<L>  |  1.80<H>    Ca    9.0      2019 09:54  Phos  3.4     -  Mg     2.1         TPro  6.2<L>  /  Alb  3.4  /  TBili  0.5  /  DBili  0.2  /  AST  106<H>  /  ALT  121<H>  /  AlkPhos  175<H>      PT/INR - ( 2019 09:36 )   PT: 10.8 sec;   INR: 0.94 ratio         PTT - ( 2019 15:04 )  PTT:37.2 sec  Urinalysis Basic - ( 2019 23:47 )    Color: Yellow / Appearance: Clear / S.010 / pH: x  Gluc: x / Ketone: Negative  / Bili: Negative / Urobili: Negative mg/dL   Blood: x / Protein: 100 mg/dL / Nitrite: Negative   Leuk Esterase: Negative / RBC: 0-2 /HPF / WBC 0-2   Sq Epi: x / Non Sq Epi: Negative / Bacteria: Occasional        RADIOLOGY & ADDITIONAL TESTS: INTERVAL HPI/OVERNIGHT EVENTS:    CC: gall stone pancreatitis, CHF, NAVIN, hypothyroid, hypertension    Feels short of breath today, pain in abdomen better, tolerating clears.     Vital Signs Last 24 Hrs  T(C): 36.7 (2019 09:07), Max: 37.1 (2019 23:33)  T(F): 98 (2019 09:07), Max: 98.8 (2019 23:33)  HR: 84 (2019 09:30) (77 - 86)  BP: 158/60 (2019 11:16) (130/67 - 190/60)  BP(mean): --  RR: 16 (2019 09:30) (16 - 20)  SpO2: 93% (2019 09:30) (79% - 94%)    PHYSICAL EXAM:    GENERAL: Not in distress, alert, speaking in full sentences  CHEST/LUNG: b/l air entry, decreased in bases  HEART: Regular, systolic murmur  ABDOMEN: Soft, Nontender, BS+  EXTREMITIES:  No edema, tenderness.    MEDICATIONS  (STANDING):  aspirin enteric coated 81 milliGRAM(s) Oral daily  cyanocobalamin 1000 MICROGram(s) Oral daily  docusate sodium 100 milliGRAM(s) Oral two times a day  fentaNYL   Patch  25 MICROgram(s)/Hr 1 Patch Transdermal every 72 hours  folic acid 1 milliGRAM(s) Oral daily  levothyroxine 25 MICROGram(s) Oral daily  NIFEdipine XL 30 milliGRAM(s) Oral daily  pantoprazole    Tablet 40 milliGRAM(s) Oral before breakfast  senna 2 Tablet(s) Oral at bedtime    MEDICATIONS  (PRN):  ondansetron Injectable 4 milliGRAM(s) IV Push every 6 hours PRN Nausea and/or Vomiting      Allergies    codeine (Vomiting)  penicillin (Rash)  Sulfacetamide Sodium (Rash)    Intolerances          LABS:                          9.3    6.4   )-----------( 158      ( 2019 09:54 )             29.1     04-06    145  |  114<H>  |  32.0<H>  ----------------------------<  84  4.6   |  20.0<L>  |  1.80<H>    Ca    9.0      2019 09:54  Phos  3.4     04-05  Mg     2.1     04-05    TPro  6.2<L>  /  Alb  3.4  /  TBili  0.5  /  DBili  0.2  /  AST  106<H>  /  ALT  121<H>  /  AlkPhos  175<H>  04-    PT/INR - ( 2019 09:36 )   PT: 10.8 sec;   INR: 0.94 ratio         PTT - ( 2019 15:04 )  PTT:37.2 sec  Urinalysis Basic - ( 2019 23:47 )    Color: Yellow / Appearance: Clear / S.010 / pH: x  Gluc: x / Ketone: Negative  / Bili: Negative / Urobili: Negative mg/dL   Blood: x / Protein: 100 mg/dL / Nitrite: Negative   Leuk Esterase: Negative / RBC: 0-2 /HPF / WBC 0-2   Sq Epi: x / Non Sq Epi: Negative / Bacteria: Occasional        RADIOLOGY & ADDITIONAL TESTS:

## 2019-04-06 NOTE — PROGRESS NOTE ADULT - PROBLEM SELECTOR PLAN 1
Improving, continue clear liquid diet, pain control. Improving, continue clear liquid diet, pain control. Will hold fluids in v/o CHF. Patient declining MRCP and surgical evaluation.

## 2019-04-06 NOTE — PROGRESS NOTE ADULT - PROBLEM SELECTOR PLAN 4
? Baseline, will discontinue fluids in v/o CHF. ? Baseline, will discontinue fluids in v/o CHF. Start IV Lasix. Per daughter at bedside, she had an abnormal creatinine years ago, attributed to a medicine. No recent renal follow up.

## 2019-04-06 NOTE — PROGRESS NOTE ADULT - ASSESSMENT
89 yr old female with hypertension, hypothyroid, breast cancer admitted with complaints of abdominal pain, nausea, vomiting. Noted to have elevated Lipase, imaging negative for pancreatitis and US abdomen noted to have cholelithiases. She was placed on IVF, pain control initiated. GI consulted. Also noted to have NAVIN. MRI/MRCP ordered by GI. Clear liquid diet started. She complained of worsening shortness of breath and hypoxia. CXR was ordered, consistent with CHF. Fluids were discontinued. ECHO ordered. 89 yr old female with hypertension, hypothyroid, breast cancer admitted with complaints of abdominal pain, nausea, vomiting. Noted to have elevated Lipase, imaging negative for pancreatitis and US abdomen noted to have cholelithiases. She was placed on IVF, pain control initiated. GI consulted. Also noted to have NAVIN. MRI/MRCP ordered by GI. Clear liquid diet started. She complained of worsening shortness of breath and hypoxia. CXR was ordered, consistent with CHF. Fluids were discontinued. ECHO ordered. Lasix was ordered, cardiology consulted. Advance directives discussed with patient, patient to think about it.

## 2019-04-07 LAB
ALBUMIN SERPL ELPH-MCNC: 3.1 G/DL — LOW (ref 3.3–5.2)
ALP SERPL-CCNC: 171 U/L — HIGH (ref 40–120)
ALT FLD-CCNC: 91 U/L — HIGH
AMYLASE P1 CFR SERPL: 687 U/L — HIGH (ref 36–128)
ANION GAP SERPL CALC-SCNC: 13 MMOL/L — SIGNIFICANT CHANGE UP (ref 5–17)
AST SERPL-CCNC: 63 U/L — HIGH
BILIRUB SERPL-MCNC: 0.4 MG/DL — SIGNIFICANT CHANGE UP (ref 0.4–2)
BUN SERPL-MCNC: 35 MG/DL — HIGH (ref 8–20)
CALCIUM SERPL-MCNC: 9.4 MG/DL — SIGNIFICANT CHANGE UP (ref 8.6–10.2)
CHLORIDE SERPL-SCNC: 107 MMOL/L — SIGNIFICANT CHANGE UP (ref 98–107)
CO2 SERPL-SCNC: 21 MMOL/L — LOW (ref 22–29)
CREAT SERPL-MCNC: 1.77 MG/DL — HIGH (ref 0.5–1.3)
GLUCOSE SERPL-MCNC: 78 MG/DL — SIGNIFICANT CHANGE UP (ref 70–115)
HCT VFR BLD CALC: 28.4 % — LOW (ref 37–47)
HGB BLD-MCNC: 9.3 G/DL — LOW (ref 12–16)
LIDOCAIN IGE QN: 571 U/L — HIGH (ref 22–51)
MAGNESIUM SERPL-MCNC: 1.9 MG/DL — SIGNIFICANT CHANGE UP (ref 1.6–2.6)
MCHC RBC-ENTMCNC: 32.7 G/DL — SIGNIFICANT CHANGE UP (ref 32–36)
MCHC RBC-ENTMCNC: 33.5 PG — HIGH (ref 27–31)
MCV RBC AUTO: 102.2 FL — HIGH (ref 81–99)
PHOSPHATE SERPL-MCNC: 3.9 MG/DL — SIGNIFICANT CHANGE UP (ref 2.4–4.7)
PLATELET # BLD AUTO: 155 K/UL — SIGNIFICANT CHANGE UP (ref 150–400)
POTASSIUM SERPL-MCNC: 4.3 MMOL/L — SIGNIFICANT CHANGE UP (ref 3.5–5.3)
POTASSIUM SERPL-SCNC: 4.3 MMOL/L — SIGNIFICANT CHANGE UP (ref 3.5–5.3)
PROT SERPL-MCNC: 6.2 G/DL — LOW (ref 6.6–8.7)
RBC # BLD: 2.78 M/UL — LOW (ref 4.4–5.2)
RBC # FLD: 16.6 % — HIGH (ref 11–15.6)
SODIUM SERPL-SCNC: 141 MMOL/L — SIGNIFICANT CHANGE UP (ref 135–145)
WBC # BLD: 5.2 K/UL — SIGNIFICANT CHANGE UP (ref 4.8–10.8)
WBC # FLD AUTO: 5.2 K/UL — SIGNIFICANT CHANGE UP (ref 4.8–10.8)

## 2019-04-07 PROCEDURE — 99232 SBSQ HOSP IP/OBS MODERATE 35: CPT

## 2019-04-07 PROCEDURE — 99233 SBSQ HOSP IP/OBS HIGH 50: CPT

## 2019-04-07 RX ORDER — ACETAMINOPHEN 500 MG
1000 TABLET ORAL ONCE
Qty: 0 | Refills: 0 | Status: COMPLETED | OUTPATIENT
Start: 2019-04-07 | End: 2019-04-07

## 2019-04-07 RX ORDER — ACETAMINOPHEN 500 MG
650 TABLET ORAL EVERY 6 HOURS
Qty: 0 | Refills: 0 | Status: DISCONTINUED | OUTPATIENT
Start: 2019-04-07 | End: 2019-04-10

## 2019-04-07 RX ORDER — OXYCODONE AND ACETAMINOPHEN 5; 325 MG/1; MG/1
1 TABLET ORAL EVERY 4 HOURS
Qty: 0 | Refills: 0 | Status: DISCONTINUED | OUTPATIENT
Start: 2019-04-07 | End: 2019-04-10

## 2019-04-07 RX ORDER — HYDRALAZINE HCL 50 MG
25 TABLET ORAL EVERY 12 HOURS
Qty: 0 | Refills: 0 | Status: DISCONTINUED | OUTPATIENT
Start: 2019-04-07 | End: 2019-04-08

## 2019-04-07 RX ADMIN — Medication 100 MILLIGRAM(S): at 17:15

## 2019-04-07 RX ADMIN — Medication 650 MILLIGRAM(S): at 01:39

## 2019-04-07 RX ADMIN — FENTANYL CITRATE 1 PATCH: 50 INJECTION INTRAVENOUS at 23:18

## 2019-04-07 RX ADMIN — Medication 100 MILLIGRAM(S): at 06:24

## 2019-04-07 RX ADMIN — Medication 400 MILLIGRAM(S): at 04:09

## 2019-04-07 RX ADMIN — Medication 25 MICROGRAM(S): at 06:24

## 2019-04-07 RX ADMIN — Medication 40 MILLIGRAM(S): at 06:24

## 2019-04-07 RX ADMIN — FENTANYL CITRATE 1 PATCH: 50 INJECTION INTRAVENOUS at 19:00

## 2019-04-07 RX ADMIN — Medication 25 MILLIGRAM(S): at 17:17

## 2019-04-07 RX ADMIN — FENTANYL CITRATE 1 PATCH: 50 INJECTION INTRAVENOUS at 21:00

## 2019-04-07 RX ADMIN — HEPARIN SODIUM 5000 UNIT(S): 5000 INJECTION INTRAVENOUS; SUBCUTANEOUS at 17:15

## 2019-04-07 RX ADMIN — FENTANYL CITRATE 1 PATCH: 50 INJECTION INTRAVENOUS at 07:23

## 2019-04-07 RX ADMIN — Medication 10 MILLIGRAM(S): at 00:14

## 2019-04-07 RX ADMIN — Medication 650 MILLIGRAM(S): at 02:09

## 2019-04-07 RX ADMIN — Medication 30 MILLIGRAM(S): at 06:24

## 2019-04-07 RX ADMIN — SENNA PLUS 2 TABLET(S): 8.6 TABLET ORAL at 23:19

## 2019-04-07 RX ADMIN — Medication 1000 MILLIGRAM(S): at 04:39

## 2019-04-07 RX ADMIN — Medication 40 MILLIGRAM(S): at 17:15

## 2019-04-07 RX ADMIN — Medication 81 MILLIGRAM(S): at 11:32

## 2019-04-07 RX ADMIN — Medication 1 MILLIGRAM(S): at 11:32

## 2019-04-07 RX ADMIN — PANTOPRAZOLE SODIUM 40 MILLIGRAM(S): 20 TABLET, DELAYED RELEASE ORAL at 06:26

## 2019-04-07 RX ADMIN — HEPARIN SODIUM 5000 UNIT(S): 5000 INJECTION INTRAVENOUS; SUBCUTANEOUS at 06:24

## 2019-04-07 RX ADMIN — PREGABALIN 1000 MICROGRAM(S): 225 CAPSULE ORAL at 11:32

## 2019-04-07 NOTE — PROGRESS NOTE ADULT - SUBJECTIVE AND OBJECTIVE BOX
INTERVAL HPI/OVERNIGHT EVENTS:    CC: gall stone pancreatitis, CHF, NAVIN, hypothyroid, hypertension    Less short of breath, occasional 'heaviness', which has now resolved. Was scheduled for MRI which she declines.    Vital Signs Last 24 Hrs  T(C): 36.7 (07 Apr 2019 04:36), Max: 36.7 (06 Apr 2019 16:35)  T(F): 98 (07 Apr 2019 04:36), Max: 98 (06 Apr 2019 16:35)  HR: 55 (07 Apr 2019 11:00) (55 - 91)  BP: 166/60 (07 Apr 2019 04:36) (159/62 - 182/79)  BP(mean): --  RR: 18 (07 Apr 2019 08:36) (17 - 18)  SpO2: 96% (07 Apr 2019 08:36) (92% - 96%)    PHYSICAL EXAM:    GENERAL: Not in distress, alert  CHEST/LUNG: b/l air entry, decreased in bases  HEART: Regular   ABDOMEN: Soft, BS+  EXTREMITIES:  No edema, tenderness.    MEDICATIONS  (STANDING):  aspirin enteric coated 81 milliGRAM(s) Oral daily  cyanocobalamin 1000 MICROGram(s) Oral daily  docusate sodium 100 milliGRAM(s) Oral two times a day  fentaNYL   Patch  25 MICROgram(s)/Hr 1 Patch Transdermal every 72 hours  folic acid 1 milliGRAM(s) Oral daily  furosemide   Injectable 40 milliGRAM(s) IV Push every 12 hours  heparin  Injectable 5000 Unit(s) SubCutaneous every 12 hours  levothyroxine 25 MICROGram(s) Oral daily  NIFEdipine XL 30 milliGRAM(s) Oral daily  pantoprazole    Tablet 40 milliGRAM(s) Oral before breakfast  senna 2 Tablet(s) Oral at bedtime    MEDICATIONS  (PRN):  acetaminophen   Tablet .. 650 milliGRAM(s) Oral every 6 hours PRN Moderate Pain (4 - 6)  ondansetron Injectable 4 milliGRAM(s) IV Push every 6 hours PRN Nausea and/or Vomiting      Allergies    codeine (Vomiting)  penicillin (Rash)  Sulfacetamide Sodium (Rash)    Intolerances          LABS:                          9.3    5.2   )-----------( 155      ( 07 Apr 2019 08:00 )             28.4     04-07    141  |  107  |  35.0<H>  ----------------------------<  78  4.3   |  21.0<L>  |  1.77<H>    Ca    9.4      07 Apr 2019 08:00  Phos  3.9     04-07  Mg     1.9     04-07    TPro  6.2<L>  /  Alb  3.1<L>  /  TBili  0.4  /  DBili  x   /  AST  63<H>  /  ALT  91<H>  /  AlkPhos  171<H>  04-07          RADIOLOGY & ADDITIONAL TESTS:

## 2019-04-07 NOTE — PHYSICAL THERAPY INITIAL EVALUATION ADULT - ADDITIONAL COMMENTS
Pt reports she is independent PTA, ambulates with RW. Performs ADLs independently, cooks, cleans. Pt does not drive, daughter takes her to appointments etc.

## 2019-04-07 NOTE — CONSULT NOTE ADULT - SUBJECTIVE AND OBJECTIVE BOX
[facility], [facility]                [address] [place] [address]                                     PHONE: [phone]                                       FAX: [phone]  [email or web address]      Reason for Consult:    HPI:      PAST MEDICAL & SURGICAL HISTORY:  Glaucoma  HTN (hypertension)  Breast cancer: s/p RT lumpectomy and radiation  Hypothyroid  S/P cataract extraction: bilateral  S/P lumpectomy, right breast      Telemetry:     PREVIOUS DIAGNOSTIC TESTING:      EKG:     ECHO FINDINGS:    STRESS FINDINGS:    CATHETERIZATION FINDINGS:    MEDICATIONS  (STANDING):  aspirin enteric coated 81 milliGRAM(s) Oral daily  cyanocobalamin 1000 MICROGram(s) Oral daily  docusate sodium 100 milliGRAM(s) Oral two times a day  fentaNYL   Patch  25 MICROgram(s)/Hr 1 Patch Transdermal every 72 hours  folic acid 1 milliGRAM(s) Oral daily  furosemide   Injectable 40 milliGRAM(s) IV Push every 12 hours  heparin  Injectable 5000 Unit(s) SubCutaneous every 12 hours  levothyroxine 25 MICROGram(s) Oral daily  NIFEdipine XL 30 milliGRAM(s) Oral daily  pantoprazole    Tablet 40 milliGRAM(s) Oral before breakfast  senna 2 Tablet(s) Oral at bedtime    MEDICATIONS  (PRN):  acetaminophen   Tablet .. 650 milliGRAM(s) Oral every 6 hours PRN Moderate Pain (4 - 6)  ondansetron Injectable 4 milliGRAM(s) IV Push every 6 hours PRN Nausea and/or Vomiting      FAMILY HISTORY:  Family history of brain cancer: Brother  Family history of uterine cancer: Sister - cervical/uterine  Family hx of lung cancer: 2 Brothers      SOCIAL HISTORY:  CIGARETTES:  ALCOHOL:    ROS: Negative other than as mentioned in HPI.    Vital Signs Last 24 Hrs  T(C): 36.7 (07 Apr 2019 04:36), Max: 36.7 (06 Apr 2019 09:07)  T(F): 98 (07 Apr 2019 04:36), Max: 98 (06 Apr 2019 09:07)  HR: 86 (07 Apr 2019 04:36) (75 - 91)  BP: 166/60 (07 Apr 2019 04:36) (158/60 - 190/60)  BP(mean): --  RR: 18 (07 Apr 2019 01:15) (16 - 20)  SpO2: 92% (07 Apr 2019 04:36) (79% - 96%)    PHYSICAL EXAM:  General: Appears well developed, well nourished alert and cooperative.  HEENT: Head; normocephalic, atraumatic. sclera anicteric, MMM, no JVD, neck is supple   CARDIOVASCULAR; 1/6 SOHAN, no rub, gallop or lift. Normal S1 and S2.  LUNGS; No rales, rhonchi or wheeze. Normal breath sounds bilaterally.  ABDOMEN ; Soft, nontender without mass or organomegaly. bowel sounds normoactive.  EXTREMITIES; No clubbing, cyanosis or edema. Distal pulses wnl. ROM normal.  SKIN; warm and dry with normal turgor.  NEURO; Alert/oriented x 3/normal motor exam.     PSYCH; normal affect.        I&O's Detail      LABS:                        9.3    6.4   )-----------( 158      ( 06 Apr 2019 09:54 )             29.1     04-06    145  |  114<H>  |  32.0<H>  ----------------------------<  84  4.6   |  20.0<L>  |  1.80<H>    Ca    9.0      06 Apr 2019 09:54  Phos  3.4     04-05  Mg     2.1     04-05    TPro  6.2<L>  /  Alb  3.4  /  TBili  0.5  /  DBili  0.2  /  AST  106<H>  /  ALT  121<H>  /  AlkPhos  175<H>  04-06        PT/INR - ( 05 Apr 2019 09:36 )   PT: 10.8 sec;   INR: 0.94 ratio             I&O's Summary      RADIOLOGY & ADDITIONAL STUDIES: [facility], [facility]                [address] [place] [address]                                     PHONE: [phone]                                       FAX: [phone]  [email or web address]      Reason for Consult: shortness of breath     HPI:  Ms Figueroa is a 89-year-old female with a history of hypertension, hyperlipidemia, no previous coronary disease, transient Wenckebach without significant bradycardia, known moderate aortic stenosis who presents with epigastric discomfort found to have gallstone pancreatitis s/p IVF now with shortness of breath.     PAST MEDICAL & SURGICAL HISTORY:  Glaucoma  HTN (hypertension)  Breast cancer: s/p RT lumpectomy and radiation  Hypothyroid  S/P cataract extraction: bilateral  S/P lumpectomy, right breast    EKG: < from: 12 Lead ECG (04.04.19 @ 12:06) >  Sinus rhythm with 1st degree A-V block  Voltage criteria for left ventricular hypertrophy    ECHO FINDINGS:  TTE 1/19/19  mild concentric LVH with predominant septic dull thickening no evidence of obstruction normal ejection fraction 65%, normal RV size and contractility, moderate to severe mitral and calcification with moderate associated regurgitation, aortic valve appeared trileaflet and calcified with reduced valve leaflet mobility, peak gradient 25 mmHg mean gradient of 11 aortic valve area of 1.1 cm² consistent with moderate not severe calcific aortic stenosis, D.O. I 0.35, moderate tricuspid regurgitation with moderate to severe pulmonary hypertension estimated PA P 60 mmHg    MEDICATIONS  (STANDING):  aspirin enteric coated 81 milliGRAM(s) Oral daily  cyanocobalamin 1000 MICROGram(s) Oral daily  docusate sodium 100 milliGRAM(s) Oral two times a day  fentaNYL   Patch  25 MICROgram(s)/Hr 1 Patch Transdermal every 72 hours  folic acid 1 milliGRAM(s) Oral daily  furosemide   Injectable 40 milliGRAM(s) IV Push every 12 hours  heparin  Injectable 5000 Unit(s) SubCutaneous every 12 hours  levothyroxine 25 MICROGram(s) Oral daily  NIFEdipine XL 30 milliGRAM(s) Oral daily  pantoprazole    Tablet 40 milliGRAM(s) Oral before breakfast  senna 2 Tablet(s) Oral at bedtime    MEDICATIONS  (PRN):  acetaminophen   Tablet .. 650 milliGRAM(s) Oral every 6 hours PRN Moderate Pain (4 - 6)  ondansetron Injectable 4 milliGRAM(s) IV Push every 6 hours PRN Nausea and/or Vomiting      FAMILY HISTORY:  Family history of brain cancer: Brother  Family history of uterine cancer: Sister - cervical/uterine  Family hx of lung cancer: 2 Brothers      SOCIAL HISTORY:  CIGARETTES:  ALCOHOL:    ROS: Negative other than as mentioned in HPI.    Vital Signs Last 24 Hrs  T(C): 36.7 (07 Apr 2019 04:36), Max: 36.7 (06 Apr 2019 09:07)  T(F): 98 (07 Apr 2019 04:36), Max: 98 (06 Apr 2019 09:07)  HR: 86 (07 Apr 2019 04:36) (75 - 91)  BP: 166/60 (07 Apr 2019 04:36) (158/60 - 190/60)  BP(mean): --  RR: 18 (07 Apr 2019 01:15) (16 - 20)  SpO2: 92% (07 Apr 2019 04:36) (79% - 96%)    PHYSICAL EXAM:  General: Appears well developed, well nourished alert and cooperative.  HEENT: Head; normocephalic, atraumatic. sclera anicteric, MMM, no JVD, neck is supple   CARDIOVASCULAR; 1/6 SOHAN, no rub, gallop or lift. Normal S1 and S2.  LUNGS; No rales, rhonchi or wheeze. Normal breath sounds bilaterally.  ABDOMEN ; Soft, nontender without mass or organomegaly. bowel sounds normoactive.  EXTREMITIES; No clubbing, cyanosis or edema. Distal pulses wnl. ROM normal.  SKIN; warm and dry with normal turgor.  NEURO; Alert/oriented x 3/normal motor exam.     PSYCH; normal affect.        I&O's Detail      LABS:                        9.3    6.4   )-----------( 158      ( 06 Apr 2019 09:54 )             29.1     04-06    145  |  114<H>  |  32.0<H>  ----------------------------<  84  4.6   |  20.0<L>  |  1.80<H>    Ca    9.0      06 Apr 2019 09:54  Phos  3.4     04-05  Mg     2.1     04-05    TPro  6.2<L>  /  Alb  3.4  /  TBili  0.5  /  DBili  0.2  /  AST  106<H>  /  ALT  121<H>  /  AlkPhos  175<H>  04-06        PT/INR - ( 05 Apr 2019 09:36 )   PT: 10.8 sec;   INR: 0.94 ratio             I&O's Summary      RADIOLOGY & ADDITIONAL STUDIES:

## 2019-04-07 NOTE — PHYSICAL THERAPY INITIAL EVALUATION ADULT - PLANNED THERAPY INTERVENTIONS, PT EVAL
transfer training/gait training/strengthening/balance training/bed mobility training/neuromuscular re-education/stretching

## 2019-04-07 NOTE — PROGRESS NOTE ADULT - ASSESSMENT
89 yr old female with hypertension, hypothyroid, breast cancer admitted with complaints of abdominal pain, nausea, vomiting. Noted to have elevated Lipase, imaging negative for pancreatitis and US abdomen noted to have cholelithiases. She was placed on IVF, pain control initiated. GI consulted. Also noted to have NAVIN. MRI/MRCP ordered by GI. Clear liquid diet started. She complained of worsening shortness of breath and hypoxia. CXR was ordered, consistent with CHF. Fluids were discontinued. ECHO ordered. Lasix was ordered, cardiology consulted. Advance directives discussed with patient, patient to think about it.

## 2019-04-07 NOTE — CONSULT NOTE ADULT - ASSESSMENT
Ms Figueroa is a 89-year-old female with a history of hypertension, hyperlipidemia, no previous coronary disease, transient Wenckebach without significant bradycardia, known moderate aortic stenosis who presents with epigastric discomfort found to have gallstone pancreatitis s/p IVF now with shortness of breath. Ms Figueroa is a 89-year-old female with a history of hypertension, hyperlipidemia, no previous coronary disease, transient Wenckebach without significant bradycardia, known moderate aortic stenosis who presents with epigastric discomfort found to have gallstone pancreatitis s/p IVF now with shortness of breath.     Shortness of breath  - suspect iatrogenic hypervolemia in patient with diastolic dysfunction and moderate AS   - lasix 40mg IV Q12 and close monitoring of renal indices   - pain control for pancreatitis   - encourage PO hydration   - continue antihypertensive regimen   - DVT ppx

## 2019-04-07 NOTE — PHYSICAL THERAPY INITIAL EVALUATION ADULT - CRITERIA FOR SKILLED THERAPEUTIC INTERVENTIONS
functional limitations in following categories/risk reduction/prevention/anticipated equipment needs at discharge/anticipated discharge recommendation/impairments found/therapy frequency

## 2019-04-07 NOTE — PROGRESS NOTE ADULT - SUBJECTIVE AND OBJECTIVE BOX
Pt seen and examined. IVF D/Preet because of fluid overload and CHF. She refused MRI and is refusing surgery. Abdominal pain has improved and PE reveals less epigastric tenderness. She is presently on clear liquids and will advance diet to DASH/ TLC/ low fat today. If tolerates this diet advancement today can D/C pt. home tomorrow from a GI standpoint on a low fat diet. Signing off. Reconsult as needed. Thank you.

## 2019-04-08 ENCOUNTER — TRANSCRIPTION ENCOUNTER (OUTPATIENT)
Age: 84
End: 2019-04-08

## 2019-04-08 LAB
ALBUMIN SERPL ELPH-MCNC: 3.3 G/DL — SIGNIFICANT CHANGE UP (ref 3.3–5.2)
ALP SERPL-CCNC: 158 U/L — HIGH (ref 40–120)
ALT FLD-CCNC: 69 U/L — HIGH
AMYLASE P1 CFR SERPL: 458 U/L — HIGH (ref 36–128)
ANION GAP SERPL CALC-SCNC: 12 MMOL/L — SIGNIFICANT CHANGE UP (ref 5–17)
AST SERPL-CCNC: 40 U/L — HIGH
BILIRUB SERPL-MCNC: 0.3 MG/DL — LOW (ref 0.4–2)
BUN SERPL-MCNC: 34 MG/DL — HIGH (ref 8–20)
CALCIUM SERPL-MCNC: 9.2 MG/DL — SIGNIFICANT CHANGE UP (ref 8.6–10.2)
CHLORIDE SERPL-SCNC: 105 MMOL/L — SIGNIFICANT CHANGE UP (ref 98–107)
CO2 SERPL-SCNC: 26 MMOL/L — SIGNIFICANT CHANGE UP (ref 22–29)
CREAT SERPL-MCNC: 2.17 MG/DL — HIGH (ref 0.5–1.3)
GLUCOSE SERPL-MCNC: 70 MG/DL — SIGNIFICANT CHANGE UP (ref 70–115)
HCT VFR BLD CALC: 28.3 % — LOW (ref 37–47)
HGB BLD-MCNC: 9.2 G/DL — LOW (ref 12–16)
LIDOCAIN IGE QN: 513 U/L — HIGH (ref 22–51)
MAGNESIUM SERPL-MCNC: 2 MG/DL — SIGNIFICANT CHANGE UP (ref 1.6–2.6)
MCHC RBC-ENTMCNC: 32.5 G/DL — SIGNIFICANT CHANGE UP (ref 32–36)
MCHC RBC-ENTMCNC: 33 PG — HIGH (ref 27–31)
MCV RBC AUTO: 101.4 FL — HIGH (ref 81–99)
PHOSPHATE SERPL-MCNC: 5 MG/DL — HIGH (ref 2.4–4.7)
PLATELET # BLD AUTO: 159 K/UL — SIGNIFICANT CHANGE UP (ref 150–400)
POTASSIUM SERPL-MCNC: 4.1 MMOL/L — SIGNIFICANT CHANGE UP (ref 3.5–5.3)
POTASSIUM SERPL-SCNC: 4.1 MMOL/L — SIGNIFICANT CHANGE UP (ref 3.5–5.3)
PROT SERPL-MCNC: 6 G/DL — LOW (ref 6.6–8.7)
RBC # BLD: 2.79 M/UL — LOW (ref 4.4–5.2)
RBC # FLD: 16.1 % — HIGH (ref 11–15.6)
SODIUM SERPL-SCNC: 143 MMOL/L — SIGNIFICANT CHANGE UP (ref 135–145)
T3 SERPL-MCNC: 86 NG/DL — SIGNIFICANT CHANGE UP (ref 80–200)
T4 AB SER-ACNC: 7.4 UG/DL — SIGNIFICANT CHANGE UP (ref 4.5–12)
TSH SERPL-MCNC: 5.43 UIU/ML — HIGH (ref 0.27–4.2)
WBC # BLD: 4.7 K/UL — LOW (ref 4.8–10.8)
WBC # FLD AUTO: 4.7 K/UL — LOW (ref 4.8–10.8)

## 2019-04-08 PROCEDURE — 99232 SBSQ HOSP IP/OBS MODERATE 35: CPT

## 2019-04-08 PROCEDURE — 93306 TTE W/DOPPLER COMPLETE: CPT | Mod: 26

## 2019-04-08 PROCEDURE — 99233 SBSQ HOSP IP/OBS HIGH 50: CPT

## 2019-04-08 RX ORDER — FUROSEMIDE 40 MG
40 TABLET ORAL DAILY
Qty: 0 | Refills: 0 | Status: DISCONTINUED | OUTPATIENT
Start: 2019-04-08 | End: 2019-04-09

## 2019-04-08 RX ADMIN — Medication 100 MILLIGRAM(S): at 17:35

## 2019-04-08 RX ADMIN — PREGABALIN 1000 MICROGRAM(S): 225 CAPSULE ORAL at 11:43

## 2019-04-08 RX ADMIN — Medication 81 MILLIGRAM(S): at 11:43

## 2019-04-08 RX ADMIN — Medication 1 MILLIGRAM(S): at 11:43

## 2019-04-08 RX ADMIN — FENTANYL CITRATE 1 PATCH: 50 INJECTION INTRAVENOUS at 22:05

## 2019-04-08 RX ADMIN — FENTANYL CITRATE 1 PATCH: 50 INJECTION INTRAVENOUS at 19:45

## 2019-04-08 RX ADMIN — Medication 25 MICROGRAM(S): at 05:24

## 2019-04-08 RX ADMIN — Medication 30 MILLIGRAM(S): at 05:25

## 2019-04-08 RX ADMIN — Medication 40 MILLIGRAM(S): at 05:25

## 2019-04-08 RX ADMIN — Medication 25 MILLIGRAM(S): at 05:27

## 2019-04-08 RX ADMIN — HEPARIN SODIUM 5000 UNIT(S): 5000 INJECTION INTRAVENOUS; SUBCUTANEOUS at 05:24

## 2019-04-08 RX ADMIN — Medication 100 MILLIGRAM(S): at 05:26

## 2019-04-08 RX ADMIN — PANTOPRAZOLE SODIUM 40 MILLIGRAM(S): 20 TABLET, DELAYED RELEASE ORAL at 05:24

## 2019-04-08 RX ADMIN — HEPARIN SODIUM 5000 UNIT(S): 5000 INJECTION INTRAVENOUS; SUBCUTANEOUS at 17:36

## 2019-04-08 NOTE — PROGRESS NOTE ADULT - SUBJECTIVE AND OBJECTIVE BOX
Chief Complaint: chart and hx reviewed.    HPI: 89 yo F admitted with nausea and abd pain. W/U revealed gallstone pancreatitis with sono showing stones and lab w elevated lipase and amylase. Has improved with medical therapy but became sob. Has improved and feels "fine" this AM. Hx of echo documented Aortic stenosis with BC 1.1 cm2 nl LVEFmoderate MR and PASP 60. Hx of left breast ca. Allergy to pcn and sulfa.    PAST MEDICAL & SURGICAL HISTORY:  Glaucoma  HTN (hypertension)  Breast cancer: s/p RT lumpectomy and radiation  Hypothyroid  S/P cataract extraction: bilateral  S/P lumpectomy, right breast      PREVIOUS DIAGNOSTIC TESTING:      ECHO  FINDINGS: see hpt.    STRESS  FINDINGS:    CATHETERIZATION  FINDINGS:    MEDICATIONS  (STANDING):  aspirin enteric coated 81 milliGRAM(s) Oral daily  cyanocobalamin 1000 MICROGram(s) Oral daily  docusate sodium 100 milliGRAM(s) Oral two times a day  fentaNYL   Patch  25 MICROgram(s)/Hr 1 Patch Transdermal every 72 hours  folic acid 1 milliGRAM(s) Oral daily  furosemide   Injectable 40 milliGRAM(s) IV Push every 12 hours  heparin  Injectable 5000 Unit(s) SubCutaneous every 12 hours  hydrALAZINE 25 milliGRAM(s) Oral every 12 hours  levothyroxine 25 MICROGram(s) Oral daily  pantoprazole    Tablet 40 milliGRAM(s) Oral before breakfast  senna 2 Tablet(s) Oral at bedtime    MEDICATIONS  (PRN):  acetaminophen   Tablet .. 650 milliGRAM(s) Oral every 6 hours PRN Moderate Pain (4 - 6)  ondansetron Injectable 4 milliGRAM(s) IV Push every 6 hours PRN Nausea and/or Vomiting  oxyCODONE    5 mG/acetaminophen 325 mG 1 Tablet(s) Oral every 4 hours PRN Severe Pain (7 - 10)      FAMILY HISTORY:  Family history of brain cancer: Brother  Family history of uterine cancer: Sister - cervical/uterine  Family hx of lung cancer: 2 Brothers      ROS: Negative other than as mentioned in HPI.    Vital Signs Last 24 Hrs  T(C): 36.3 (08 Apr 2019 04:48), Max: 36.7 (07 Apr 2019 11:00)  T(F): 97.4 (08 Apr 2019 04:48), Max: 98.1 (07 Apr 2019 11:00)  HR: 65 reg (08 Apr 2019 07:30) (51 - 73)  BP: 110/70 la manually (08 Apr 2019 04:48) (111/50 - 168/71)  BP(mean): --  RR: 14 ora (07 Apr 2019 21:40) (18 - 19)  SpO2: 98% (08 Apr 2019 07:30) (98% - 100%)    PHYSICAL EXAM:  General: Appears well developed, well nourished alert and cooperative. Afebrile very elderly frail F eating breakfast. NAD.  HEENT: Head; normocephalic, atraumatic.  Eyes;   Pupils reactive, cornea wnl.  Neck; Supple, no nodes adenopathy, + NVD No carotid bruit or thyromegaly.  CARDIOVASCULAR; 3/6 basal murmur, absent S2. No DM. rub, gallop or lift. Normal S1.  LUNGS; No rales, rhonchi or wheeze. Normal breath sounds bilaterally.  ABDOMEN ; Soft, nontender without mass or organomegaly. bowel sounds normoactive.  EXTREMITIES; No clubbing, cyanosis or edema.   SKIN; warm and dry with normal turgor.  NEURO; Alert/oriented x 3/normal motor exam.    PSYCH; normal affect.            INTERPRETATION OF TELEMETRY:    ECG: SR first degree avb. Monitor shows narrow qrs with occas Mobitz I avb.  cxr: ca++ aorta/PVH and pleural effusion.  I&O's Detail    07 Apr 2019 07:01  -  08 Apr 2019 07:00  --------------------------------------------------------  IN:  Total IN: 0 mL    OUT:    Voided: 2 mL  Total OUT: 2 mL    Total NET: -2 mL      08 Apr 2019 07:01  -  08 Apr 2019 09:09  --------------------------------------------------------  IN:  Total IN: 0 mL    OUT:    Voided: 800 mL  Total OUT: 800 mL    Total NET: -800 mL          LABS:                        9.2    4.7   )-----------( 159      ( 08 Apr 2019 05:23 )             28.3     04-08    143  |  105  |  34.0<H>  ----------------------------<  70  4.1   |  26.0  |  2.17<H>    Ca    9.2      08 Apr 2019 05:23  Phos  5.0     04-08  Mg     2.0     04-08    TPro  6.0<L>  /  Alb  3.3  /  TBili  0.3<L>  /  DBili  x   /  AST  40<H>  /  ALT  69<H>  /  AlkPhos  158<H>  04-08            I&O's Summary    07 Apr 2019 07:01  -  08 Apr 2019 07:00  --------------------------------------------------------  IN: 0 mL / OUT: 2 mL / NET: -2 mL    08 Apr 2019 07:01  -  08 Apr 2019 09:09  --------------------------------------------------------  IN: 0 mL / OUT: 800 mL / NET: -800 mL        RADIOLOGY & ADDITIONAL STUDIES:

## 2019-04-08 NOTE — PROGRESS NOTE ADULT - ATTENDING COMMENTS
Discussed with patient and RN. Discussed with patient and RN.  Updated family at bedside. Discharge home in am.

## 2019-04-08 NOTE — CDI QUERY NOTE - NSCDIOTHERTXTBX_GEN_ALL_CORE_HH
Clinical documentation indicates that this patient has CHF.  Please include more specific documentation of the acuity and, type  of Heart Failure in your Progress Note and/or Discharge Summary.      SUPPORTING DOCUMENTATION AND/OR CLINICAL EVIDENCE:  volume overload and CHF documented  Attending 3/8 note: other CHF, improved   c/o SOB & hypoxia, CXR consistent with CHF documented 3/6      CARDIOLOGY NOTE 3/8/19:  · Assessment    1. 89 yo F with gallstone pancreatitis.-resoving.  2.  Aortic stenosis with preserved EF  3. volume overload and trs-gfjkdacit-afzbxtfch.  4. renal impairment with rising creatinine-will decrease lasix to 40 PO qd.  5. anemia      ECHO RESULTS:  < from: TTE Echo Complete w/Doppler (04.08.19 @ 12:33) >    Summary:   1. Technically adequate study.   2. Normal left ventricular internal cavity size.   3.Hyperdynamic global left ventricular systolic function.   4. Left ventricular ejection fraction, by visual estimation, is 70 to   75%.   5. Spectral Doppler shows impaired relaxation pattern of left   ventricular myocardial filling (Grade I diastolic dysfunction).   6. Normal right ventricular size and function.   7. Mild aortic valve stenosis.   8. Mitral valve mean gradient is 3.0 mmHg consistent with mild mitral   stenosis.   9. Moderate mitral annular calcification.  10. The right atrium is normal in size.  11. There is no evidence of pericardial effusion.        CHEST XRAY:  < from: Xray Chest 1 View- PORTABLE-Urgent (04.06.19 @ 10:59) >    FINDINGS:    Single frontal view of the chest demonstrates moderate CHF with small to   moderate bilateral pleural effusions. The cardiomediastinal silhouette is   normal. No acute osseous abnormalities.       IMPRESSION: Moderate CHF and small to moderate bilateral pleural   effusions.        BNP:  Serum Pro-Brain Natriuretic Peptide: 1735 pg/mL <H> [0 - 300] (04-04-19)      MEDICATIONS:  Lasix IV 40 mg q12h 4/6 - 4/8          PO 40 mg 4/8 -        Please clarify, in progress notes and dc summary, the acuity of CHF.     - acute diastolic CHF   - acute on chronic diastolic CHF   - other   - unknown        Thank you

## 2019-04-08 NOTE — PROGRESS NOTE ADULT - ASSESSMENT
Mrs. Figueroa is a 89 year old female admitted for acute pancreatitis and NAVIN. Course complicated by acute respiratory failure with hypoxia secondary to pulmonary congestion on CXR, s/p diuresis and O2 supplement, now improved.    PLAN    Acute pancreatitis  - elevated amylase, lipase and US with distended GB with cholelithiasis  - amlyase, lipase, LFTs trending down  - GI on board, note reviewed, Pt refused any MRI, surgical intervention  - c/w IVF, clear liquid diet, zofran PRN N/V,      Acute decompensated Heart Failure  - improved with diuresis, saturating well at room air  - cardiology following, input appreciated  - c/w diuretics and O2 supplement PRN    Acute Pain  - stable  - c/w TD fentanyl 25 mcg q72H and oxycodone/APAP 5 mg q4H PRN (not used any)  - Suggest changing oxycodone to PO dilaudid 1 mg q4H PRN in setting of transaminitis and renal dysfunction  - c/w senna and colace for constipation prophylaxis    NAVIN  - Cr slightly worse, possibly due to recent diuretics  - avoid nephrotoxic agents, serial Cr, plan per primary team    Palliative Care Encounter  Pt awake and alert, ox4. She has good insight into her medical history and has medical decision making capacity. Pt completed MOLST with hospitalist this morning after discussion of Advance directives for DNR/DNI, confirmed during my visit in presence of granddaughter Nat. We also discussed HCP, which pt designated primary agent as daughter Morena with whom pt lives with and alternate as daughter Anna. HCP completed and witnessed by CRESCENCIO Gaming. Pt declined any further workup and surgical intervention for her pancreatitis, understands risk and benefits, verbalized desire to go home when medically stable for discharge.

## 2019-04-08 NOTE — DISCHARGE NOTE PROVIDER - NSDCFUADDINST_GEN_ALL_CORE_FT
Continue medications as instructions. Follow up with PMD in 1 week. Check weight daily. Return to ED for worsening complaints. Continue medications as instructions. Follow up with PMD in 1 week. Check weight daily. Monitor renal function.

## 2019-04-08 NOTE — DISCHARGE NOTE PROVIDER - CARE PROVIDER_API CALL
Ninfa Rubalcava)  Family Medicine  [address] [place] [address]  Phone: [phone]  Fax: [phone]  Follow Up Time: 1 week    Josemanuel Simpson)  Cardiovascular Disease; Critical Care Medicine; Internal Medicine  [address] [place] [address]  Phone: [phone]  Fax: [phone]  Follow Up Time:

## 2019-04-08 NOTE — PROGRESS NOTE ADULT - ASSESSMENT
89 y/o woman with gallstone pancreatitis.   Now pain free. Tolerating low fat diet.  ok to d/c please call with questions.

## 2019-04-08 NOTE — PROGRESS NOTE ADULT - ASSESSMENT
89 yr old female with hypertension, hypothyroid, breast cancer admitted with complaints of abdominal pain, nausea, vomiting. Noted to have elevated Lipase, imaging negative for pancreatitis and US abdomen noted to have cholelithiases. She was placed on IVF, pain control initiated. GI consulted. Also noted to have NAVIN. MRI/MRCP ordered by GI. Clear liquid diet started. She complained of worsening shortness of breath and hypoxia. CXR was ordered, consistent with CHF. Fluids were discontinued. ECHO ordered. Lasix was ordered, cardiology consulted. Advance directives discussed with patient, patient to think about it. Patient signed MOLST, is DNR/DNI.

## 2019-04-08 NOTE — PROGRESS NOTE ADULT - SUBJECTIVE AND OBJECTIVE BOX
FOLLOW UP VISIT, goc, sosito mgnt    INTERVAL HPI/OVERNIGHT EVENTS:  Pt seen and examined this morning at bedside. Granddaughter Nat present. Pt is oob to chair, reports doing well and offered no acute complaints.   Over the weekend, developed acute respiratory distress likely due to pulmonary congestion on CXR from fluid overload, s/p diruresis and O2 supplement.  Pt also declined any further MRCP and surgical intervention.  Pt breathing comfortably at room air and adequate O2 sat.    MOLST completed this morning by hospitalist, DNR/DNI.    Present Symptoms:     Dyspnea:  No   Nausea/Vomiting:  No  Anxiety:   No  Fatigue: Yes    Constipation: yes  Insomnia: no  Pain: No    Review of Systems: Reviewed, All others negative  +visual deficit leeanne Rt eye, chronic  +mild [place]    MEDICATIONS  (STANDING):  aspirin enteric coated 81 milliGRAM(s) Oral daily  cyanocobalamin 1000 MICROGram(s) Oral daily  docusate sodium 100 milliGRAM(s) Oral two times a day  fentaNYL   Patch  25 MICROgram(s)/Hr 1 Patch Transdermal every 72 hours  folic acid 1 milliGRAM(s) Oral daily  furosemide    Tablet 40 milliGRAM(s) Oral daily  heparin  Injectable 5000 Unit(s) SubCutaneous every 12 hours  levothyroxine 25 MICROGram(s) Oral daily  pantoprazole    Tablet 40 milliGRAM(s) Oral before breakfast  senna 2 Tablet(s) Oral at bedtime    MEDICATIONS  (PRN):  acetaminophen   Tablet .. 650 milliGRAM(s) Oral every 6 hours PRN Moderate Pain (4 - 6)  ondansetron Injectable 4 milliGRAM(s) IV Push every 6 hours PRN Nausea and/or Vomiting  oxyCODONE    5 mG/acetaminophen 325 mG 1 Tablet(s) Oral every 4 hours PRN Severe Pain (7 - 10)      PHYSICAL EXAM:    Vital Signs Last 24 Hrs  T(C): 36.3 (08 Apr 2019 04:48), Max: 36.3 (07 Apr 2019 21:40)  T(F): 97.4 (08 Apr 2019 04:48), Max: 97.4 (07 Apr 2019 21:40)  HR: 77 (08 Apr 2019 11:35) (51 - 77)  BP: 101/62 (08 Apr 2019 11:35) (101/62 - 168/71)  BP(mean): --  RR: 18 (08 Apr 2019 11:35) (18 - 19)  SpO2: 92% (08 Apr 2019 11:35) (92% - 100%)    General: Resting comfortably. No acute distress.   HEENT: mucous membrane moist    Lungs: clear to auscultation bilaterally. no rales, rhonchi, wheezing. non-labored.   CV: +s1/s2. Regular rate and rhythm. + murmurs  GI: +bowel sound. abdomen soft, non-tender, non-distended   MSK: Moves all 4 extremities. No  cyanosis or edema. weakness  Neuro: Awake and alert, Ox4. Interactive and responds appropriately   Skin: warm and dry.        LABS:                          9.2    4.7   )-----------( 159      ( 08 Apr 2019 05:23 )             28.3     04-08    143  |  105  |  34.0<H>  ----------------------------<  70  4.1   |  26.0  |  2.17<H>    Ca    9.2      08 Apr 2019 05:23  Phos  5.0     04-08  Mg     2.0     04-08    TPro  6.0<L>  /  Alb  3.3  /  TBili  0.3<L>  /  DBili  x   /  AST  40<H>  /  ALT  69<H>  /  AlkPhos  158<H>  04-08        I&O's Summary    07 Apr 2019 07:01  -  08 Apr 2019 07:00  --------------------------------------------------------  IN: 0 mL / OUT: 2 mL / NET: -2 mL    08 Apr 2019 07:01  -  08 Apr 2019 12:44  --------------------------------------------------------  IN: 0 mL / OUT: 800 mL / NET: -800 mL        RADIOLOGY & ADDITIONAL STUDIES:     CXR 4/6/19  FINDINGS:  Single frontal view of the chest demonstrates moderate CHF with small to   moderate bilateral pleural effusions. The cardiomediastinal silhouette is   normal. No acute osseous abnormalities.       IMPRESSION: Moderate CHF and small to moderate bilateral pleural   effusions.    ADVANCE DIRECTIVES:   DNR YES NO  Completed on:                     MOLST  YES NO   Completed on:  Living Will  YES NO   Completed on:

## 2019-04-08 NOTE — PROGRESS NOTE ADULT - SUBJECTIVE AND OBJECTIVE BOX
INTERVAL HPI/OVERNIGHT EVENTS:    CC: gall stone pancreatitis, CHF, NAVIN, hypothyroid, hypertension    Feels less short of breath this am. Episodes of bradycardia on telemetry. No abdominal pain, tolerating regular diet.    Vital Signs Last 24 Hrs  T(C): 36.3 (08 Apr 2019 04:48), Max: 36.3 (07 Apr 2019 21:40)  T(F): 97.4 (08 Apr 2019 04:48), Max: 97.4 (07 Apr 2019 21:40)  HR: 77 (08 Apr 2019 11:35) (51 - 77)  BP: 101/62 (08 Apr 2019 11:35) (101/62 - 168/71)  BP(mean): --  RR: 18 (08 Apr 2019 11:35) (18 - 19)  SpO2: 92% (08 Apr 2019 11:35) (92% - 100%)    PHYSICAL EXAM:    GENERAL: NAD, well-groomed, well-developed  HEAD:  Atraumatic, Normocephalic  EYES: EOMI, PERRLA, conjunctiva and sclera clear  ENMT: Moist mucous membranes  NECK: Supple, No JVD  NERVOUS SYSTEM:  Alert & Oriented X3, Motor Strength 5/5 B/L upper and lower extremities; DTRs 2+ intact and symmetric  CHEST/LUNG: Clear to auscultation bilaterally; No rales, rhonchi, wheezing, or rubs  HEART: Regular rate and rhythm; No murmurs, rubs, or gallops  ABDOMEN: Soft, Nontender, Nondistended; Bowel sounds present  EXTREMITIES:  2+ Peripheral Pulses, No clubbing, cyanosis, or edema    MEDICATIONS  (STANDING):  aspirin enteric coated 81 milliGRAM(s) Oral daily  cyanocobalamin 1000 MICROGram(s) Oral daily  docusate sodium 100 milliGRAM(s) Oral two times a day  fentaNYL   Patch  25 MICROgram(s)/Hr 1 Patch Transdermal every 72 hours  folic acid 1 milliGRAM(s) Oral daily  furosemide    Tablet 40 milliGRAM(s) Oral daily  heparin  Injectable 5000 Unit(s) SubCutaneous every 12 hours  levothyroxine 25 MICROGram(s) Oral daily  pantoprazole    Tablet 40 milliGRAM(s) Oral before breakfast  senna 2 Tablet(s) Oral at bedtime    MEDICATIONS  (PRN):  acetaminophen   Tablet .. 650 milliGRAM(s) Oral every 6 hours PRN Moderate Pain (4 - 6)  ondansetron Injectable 4 milliGRAM(s) IV Push every 6 hours PRN Nausea and/or Vomiting  oxyCODONE    5 mG/acetaminophen 325 mG 1 Tablet(s) Oral every 4 hours PRN Severe Pain (7 - 10)      Allergies    codeine (Vomiting)  penicillin (Rash)  Sulfacetamide Sodium (Rash)    Intolerances          LABS:                          9.2    4.7   )-----------( 159      ( 08 Apr 2019 05:23 )             28.3     04-08    143  |  105  |  34.0<H>  ----------------------------<  70  4.1   |  26.0  |  2.17<H>    Ca    9.2      08 Apr 2019 05:23  Phos  5.0     04-08  Mg     2.0     04-08    TPro  6.0<L>  /  Alb  3.3  /  TBili  0.3<L>  /  DBili  x   /  AST  40<H>  /  ALT  69<H>  /  AlkPhos  158<H>  04-08          RADIOLOGY & ADDITIONAL TESTS:

## 2019-04-08 NOTE — PROGRESS NOTE ADULT - ASSESSMENT
1. 89 yo F with gallstone pancreatitis.-resoving.  2.  Aortic stenosis with preserved EF  3. volume overload and bsy-dtyklemgg-ieiipswka.  4. renal impairment with rising creatinine-will decrease lasix to 40 PO qd. 1. 89 yo F with gallstone pancreatitis.-resoving.  2.  Aortic stenosis with preserved EF  3. volume overload and tzu-mpmhnuxfk-ywyrsrvau.  4. renal impairment with rising creatinine-will decrease lasix to 40 PO qd.  5. anemia

## 2019-04-08 NOTE — PROGRESS NOTE ADULT - SUBJECTIVE AND OBJECTIVE BOX
INTERVAL HPI/OVERNIGHT EVENTS:    ROS wnl     PAST MEDICAL & SURGICAL HISTORY:  Glaucoma  HTN (hypertension)  Breast cancer: s/p RT lumpectomy and radiation  Hypothyroid  S/P cataract extraction: bilateral  S/P lumpectomy, right breast      Home Medications:  aspirin: 81 milligram(s) orally once a day (04 Apr 2019 21:31)  Cosopt:  to each affected eye 2 times a day (04 Apr 2019 21:31)  isosorbide mononitrate 30 mg oral tablet, extended release: 1 tab(s) orally once a day (04 Apr 2019 21:31)  Pravachol: 20 milligram(s) orally once a day (at bedtime) (04 Apr 2019 21:31)  Synthroid 25 mcg (0.025 mg) oral tablet: 1 tab(s) orally once a day Mon-Fri  2 tabs orally once a day Sat-Sun (04 Apr 2019 21:31)  Vitamin D2 50,000 intl units (1.25 mg) oral capsule: 1 cap(s) orally once a month (04 Apr 2019 21:31)      MEDICATIONS  (STANDING):  aspirin enteric coated 81 milliGRAM(s) Oral daily  cyanocobalamin 1000 MICROGram(s) Oral daily  docusate sodium 100 milliGRAM(s) Oral two times a day  fentaNYL   Patch  25 MICROgram(s)/Hr 1 Patch Transdermal every 72 hours  folic acid 1 milliGRAM(s) Oral daily  furosemide   Injectable 40 milliGRAM(s) IV Push every 12 hours  heparin  Injectable 5000 Unit(s) SubCutaneous every 12 hours  hydrALAZINE 25 milliGRAM(s) Oral every 12 hours  levothyroxine 25 MICROGram(s) Oral daily  pantoprazole    Tablet 40 milliGRAM(s) Oral before breakfast  senna 2 Tablet(s) Oral at bedtime    MEDICATIONS  (PRN):  acetaminophen   Tablet .. 650 milliGRAM(s) Oral every 6 hours PRN Moderate Pain (4 - 6)  ondansetron Injectable 4 milliGRAM(s) IV Push every 6 hours PRN Nausea and/or Vomiting  oxyCODONE    5 mG/acetaminophen 325 mG 1 Tablet(s) Oral every 4 hours PRN Severe Pain (7 - 10)      Allergies    codeine (Vomiting)  penicillin (Rash)  Sulfacetamide Sodium (Rash)    Intolerances          PHYSICAL EXAM:   Vital Signs:  Vital Signs Last 24 Hrs  T(C): 36.3 (08 Apr 2019 04:48), Max: 36.7 (07 Apr 2019 11:00)  T(F): 97.4 (08 Apr 2019 04:48), Max: 98.1 (07 Apr 2019 11:00)  HR: 63 (08 Apr 2019 07:30) (51 - 76)  BP: 120/64 (08 Apr 2019 04:48) (111/50 - 168/71)  BP(mean): --  RR: 18 (07 Apr 2019 21:40) (18 - 19)  SpO2: 98% (08 Apr 2019 07:30) (96% - 100%)  Daily     Daily     GENERAL:  no distress  HEENT:  NC/AT,  anicteric  CHEST:   no increased effort, breath sounds clear  HEART:  Regular rhythm  ABDOMEN:  Soft, non-tender, non-distended, normoactive bowel sounds  EXTEREMITIES:  no cyanosis      LABS:                        9.2    4.7   )-----------( 159      ( 08 Apr 2019 05:23 )             28.3       Hemoglobin: 9.2 g/dL (04-08-19 @ 05:23)  Hemoglobin: 9.3 g/dL (04-07-19 @ 08:00)  Hemoglobin: 9.3 g/dL (04-06-19 @ 09:54)  Hemoglobin: 8.2 g/dL (04-05-19 @ 09:36)      04-08    143  |  105  |  34.0<H>  ----------------------------<  70  4.1   |  26.0  |  2.17<H>    Ca    9.2      08 Apr 2019 05:23  Phos  5.0     04-08  Mg     2.0     04-08    TPro  6.0<L>  /  Alb  3.3  /  TBili  0.3<L>  /  DBili  x   /  AST  40<H>  /  ALT  69<H>  /  AlkPhos  158<H>  04-08      INR: 0.94 ratio (04-05-19 @ 09:36)      Aspartate Aminotransferase (AST/SGOT): 40 U/L (04-08-19 @ 05:23)  Alkaline Phosphatase, Serum: 158 U/L (04-08-19 @ 05:23)  Alanine Aminotransferase (ALT/SGPT): 69 U/L (04-08-19 @ 05:23)  Alkaline Phosphatase, Serum: 171 U/L (04-07-19 @ 08:00)  Alanine Aminotransferase (ALT/SGPT): 91 U/L (04-07-19 @ 08:00)  Aspartate Aminotransferase (AST/SGOT): 63 U/L (04-07-19 @ 08:00)  Alkaline Phosphatase, Serum: 175 U/L (04-06-19 @ 09:54)  Bilirubin Direct, Serum: 0.2 mg/dL (04-06-19 @ 09:54)  Alanine Aminotransferase (ALT/SGPT): 121 U/L (04-06-19 @ 09:54)  Aspartate Aminotransferase (AST/SGOT): 106 U/L (04-06-19 @ 09:54)  Aspartate Aminotransferase (AST/SGOT): 210 U/L (04-05-19 @ 09:36)  Alkaline Phosphatase, Serum: 176 U/L (04-05-19 @ 09:36)  Alanine Aminotransferase (ALT/SGPT): 145 U/L (04-05-19 @ 09:36)      RADIOLOGY & ADDITIONAL TESTS:  < from: CT Abdomen and Pelvis No Cont (04.04.19 @ 17:38) >  FINDINGS:  The visualized structures of the lower neck are unremarkable.   The visualized aorta is of normal course and caliber. The heart is not   enlarged. There is no evidence of pericardial effusion. There is   atherosclerotic calcification of the aorta. There is tracheobronchial   calcification. Calcified adenomata are seen in the superior segment of   the right lower lobe. There are small bilateral pleural effusions. There   is coronary artery calcification.    There is no evidence of axillary, hilar, or mediastinal lymphadenopathy.    No focal lung consolidations identified. No evidence of pneumothorax. No   pulmonary nodules identified. There is a moderate degree of scarring in   the lung apices with calcification in the left lung apex. There is mild   passive atelectasis at the lung bases.      The liver is of normal contour. No evidence of focal hepatic lesion on   this nonenhanced examination. The gallbladder is present. No evidence of   intra or extrahepatic biliary dilatation. Calcified gallstones are seen   in the gallbladder    The pancreas, spleen, adrenal glands, and kidneys are grossly   unremarkable. There is no evidence of hydronephrosis or perinephric   stranding. No evidence of  nephrolithiasis.The bladder is unremarkable.    No evidence of lymphadenopathy utilizing CT size criteria. The aorta is   not aneurysmal. There is significant atherosclerotic calcification of the   abdominal aorta and its branches.    There is no evidence of bowel obstruction. There is no evidence of   pneumoperitoneum or free fluid. There is diverticulosis without   diverticulitis    The visualized osseous structures demonstrate osteopenia and degenerative   changes of the spine. There is ankylosis of multiple thoracic vertebral   bodies anteriorly. There is T11 moderate compression fracture and mild   bullous of height of T12.    IMPRESSION: Small bilateral pleural effusions.  Calcified granulomata in the right lung consistent with old granulomatous   disease.  Scarring in the lung apices  No acute pathology in the abdomen or pelvis.  Cholelithiasis without CT evidence of cholecystitis  Diverticulosis without diverticulitis.   Age indeterminant compression fractures of T11 and T12.    < end of copied text >

## 2019-04-08 NOTE — DISCHARGE NOTE PROVIDER - HOSPITAL COURSE
89 yr old female with hypertension, hypothyroid, breast cancer admitted with complaints of abdominal pain, nausea, vomiting. Noted to have elevated Lipase, imaging negative for pancreatitis and US abdomen noted to have cholelithiases. She was placed on IVF, pain control initiated. GI consulted. Also noted to have NAVIN. MRI/MRCP ordered by GI. Clear liquid diet started. She complained of worsening shortness of breath and hypoxia. CXR was ordered, consistent with CHF. Fluids were discontinued. ECHO ordered. Lasix was ordered, cardiology consulted. Advance directives discussed with patient, patient to think about it. Patient signed MOLST, is DNR/DNI. 89 yr old female with hypertension, hypothyroid, breast cancer admitted with complaints of abdominal pain, nausea, vomiting. Noted to have elevated Lipase, imaging negative for pancreatitis and US abdomen noted to have cholelithiases. She was placed on IVF, pain control initiated. GI consulted. Also noted to have NAVIN. MRI/MRCP ordered by GI. Clear liquid diet started. She complained of worsening shortness of breath and hypoxia. CXR was ordered, consistent with CHF. Fluids were discontinued. ECHO ordered. Lasix was ordered, cardiology consulted. Advance directives discussed with patient, patient to think about it. Patient signed MOLST, is DNR/DNI. ECHO was done, showed evidence of diastolic dysfunction. Noted to have worsening renal function. Lasix was held, nephrology consulted. Family requested rehab placement. Patient with poor oral intake and worsening renal function. Discussed with family goals of care, given advanced age, worsening renal function, underlying CHF and gall stone pancreatitis which she did not want intervention. Patient weak to express her wishes. After length discussion with daughters, they wish to speak with hospice. Palliative care follow up was requested.            Spent > 35 mins in discharge plan and documentation.

## 2019-04-09 LAB
ALBUMIN SERPL ELPH-MCNC: 3.4 G/DL — SIGNIFICANT CHANGE UP (ref 3.3–5.2)
ALP SERPL-CCNC: 174 U/L — HIGH (ref 40–120)
ALT FLD-CCNC: 61 U/L — HIGH
ANION GAP SERPL CALC-SCNC: 18 MMOL/L — HIGH (ref 5–17)
AST SERPL-CCNC: 34 U/L — HIGH
BILIRUB SERPL-MCNC: 0.4 MG/DL — SIGNIFICANT CHANGE UP (ref 0.4–2)
BUN SERPL-MCNC: 38 MG/DL — HIGH (ref 8–20)
CALCIUM SERPL-MCNC: 9.7 MG/DL — SIGNIFICANT CHANGE UP (ref 8.6–10.2)
CHLORIDE SERPL-SCNC: 102 MMOL/L — SIGNIFICANT CHANGE UP (ref 98–107)
CO2 SERPL-SCNC: 23 MMOL/L — SIGNIFICANT CHANGE UP (ref 22–29)
CREAT SERPL-MCNC: 2.24 MG/DL — HIGH (ref 0.5–1.3)
GLUCOSE SERPL-MCNC: 77 MG/DL — SIGNIFICANT CHANGE UP (ref 70–115)
HCT VFR BLD CALC: 32.4 % — LOW (ref 37–47)
HGB BLD-MCNC: 10.7 G/DL — LOW (ref 12–16)
MAGNESIUM SERPL-MCNC: 2 MG/DL — SIGNIFICANT CHANGE UP (ref 1.6–2.6)
MCHC RBC-ENTMCNC: 33 G/DL — SIGNIFICANT CHANGE UP (ref 32–36)
MCHC RBC-ENTMCNC: 33.3 PG — HIGH (ref 27–31)
MCV RBC AUTO: 100.9 FL — HIGH (ref 81–99)
PHOSPHATE SERPL-MCNC: 4.1 MG/DL — SIGNIFICANT CHANGE UP (ref 2.4–4.7)
PLATELET # BLD AUTO: 204 K/UL — SIGNIFICANT CHANGE UP (ref 150–400)
POTASSIUM SERPL-MCNC: 4.4 MMOL/L — SIGNIFICANT CHANGE UP (ref 3.5–5.3)
POTASSIUM SERPL-SCNC: 4.4 MMOL/L — SIGNIFICANT CHANGE UP (ref 3.5–5.3)
PROT SERPL-MCNC: 7 G/DL — SIGNIFICANT CHANGE UP (ref 6.6–8.7)
RBC # BLD: 3.21 M/UL — LOW (ref 4.4–5.2)
RBC # FLD: 15.8 % — HIGH (ref 11–15.6)
SODIUM SERPL-SCNC: 143 MMOL/L — SIGNIFICANT CHANGE UP (ref 135–145)
WBC # BLD: 6.2 K/UL — SIGNIFICANT CHANGE UP (ref 4.8–10.8)
WBC # FLD AUTO: 6.2 K/UL — SIGNIFICANT CHANGE UP (ref 4.8–10.8)

## 2019-04-09 PROCEDURE — 99232 SBSQ HOSP IP/OBS MODERATE 35: CPT

## 2019-04-09 RX ORDER — HYDRALAZINE HCL 50 MG
10 TABLET ORAL ONCE
Qty: 0 | Refills: 0 | Status: COMPLETED | OUTPATIENT
Start: 2019-04-09 | End: 2019-04-09

## 2019-04-09 RX ADMIN — Medication 1 MILLIGRAM(S): at 12:16

## 2019-04-09 RX ADMIN — FENTANYL CITRATE 1 PATCH: 50 INJECTION INTRAVENOUS at 07:10

## 2019-04-09 RX ADMIN — Medication 10 MILLIGRAM(S): at 05:37

## 2019-04-09 RX ADMIN — ONDANSETRON 4 MILLIGRAM(S): 8 TABLET, FILM COATED ORAL at 18:54

## 2019-04-09 RX ADMIN — SENNA PLUS 2 TABLET(S): 8.6 TABLET ORAL at 21:32

## 2019-04-09 RX ADMIN — HEPARIN SODIUM 5000 UNIT(S): 5000 INJECTION INTRAVENOUS; SUBCUTANEOUS at 05:37

## 2019-04-09 RX ADMIN — Medication 81 MILLIGRAM(S): at 12:16

## 2019-04-09 RX ADMIN — Medication 40 MILLIGRAM(S): at 05:37

## 2019-04-09 RX ADMIN — FENTANYL CITRATE 1 PATCH: 50 INJECTION INTRAVENOUS at 21:34

## 2019-04-09 RX ADMIN — PREGABALIN 1000 MICROGRAM(S): 225 CAPSULE ORAL at 12:15

## 2019-04-09 RX ADMIN — Medication 100 MILLIGRAM(S): at 17:12

## 2019-04-09 RX ADMIN — Medication 25 MICROGRAM(S): at 05:37

## 2019-04-09 RX ADMIN — HEPARIN SODIUM 5000 UNIT(S): 5000 INJECTION INTRAVENOUS; SUBCUTANEOUS at 17:12

## 2019-04-09 NOTE — PROGRESS NOTE ADULT - SUBJECTIVE AND OBJECTIVE BOX
[place] [facility] - [facility], THE [facility]                                   [address] [place] [address]                                                      PHONE: [phone]                                                         FAX: [phone]  [email or web address]  ---------------------------------------------------------------------------------------------------------------------------------    Overnight events/patient complaints:      PAST MEDICAL & SURGICAL HISTORY:  Glaucoma  HTN (hypertension)  Breast cancer: s/p RT lumpectomy and radiation  Hypothyroid  S/P cataract extraction: bilateral  S/P lumpectomy, right breast      codeine (Vomiting)  penicillin (Rash)  Sulfacetamide Sodium (Rash)    MEDICATIONS  (STANDING):  aspirin enteric coated 81 milliGRAM(s) Oral daily  cyanocobalamin 1000 MICROGram(s) Oral daily  docusate sodium 100 milliGRAM(s) Oral two times a day  fentaNYL   Patch  25 MICROgram(s)/Hr 1 Patch Transdermal every 72 hours  folic acid 1 milliGRAM(s) Oral daily  furosemide    Tablet 40 milliGRAM(s) Oral daily  heparin  Injectable 5000 Unit(s) SubCutaneous every 12 hours  levothyroxine 25 MICROGram(s) Oral daily  pantoprazole    Tablet 40 milliGRAM(s) Oral before breakfast  senna 2 Tablet(s) Oral at bedtime    MEDICATIONS  (PRN):  acetaminophen   Tablet .. 650 milliGRAM(s) Oral every 6 hours PRN Moderate Pain (4 - 6)  ondansetron Injectable 4 milliGRAM(s) IV Push every 6 hours PRN Nausea and/or Vomiting  oxyCODONE    5 mG/acetaminophen 325 mG 1 Tablet(s) Oral every 4 hours PRN Severe Pain (7 - 10)      Vital Signs Last 24 Hrs  T(C): 36.3 (09 Apr 2019 04:39), Max: 36.4 (08 Apr 2019 16:05)  T(F): 97.3 (09 Apr 2019 04:39), Max: 97.6 (08 Apr 2019 20:20)  HR: 78 (09 Apr 2019 04:39) (71 - 80)  BP: 171/75 (09 Apr 2019 04:39) (101/62 - 171/75)  BP(mean): --  RR: 18 (08 Apr 2019 23:00) (18 - 18)  SpO2: 92% (09 Apr 2019 04:39) (92% - 97%)  ICU Vital Signs Last 24 Hrs  LUIS CHIKI  I&O's Detail    08 Apr 2019 07:01  -  09 Apr 2019 07:00  --------------------------------------------------------  IN:    Oral Fluid: 500 mL  Total IN: 500 mL    OUT:    Voided: 1100 mL  Total OUT: 1100 mL    Total NET: -600 mL        I&O's Summary    08 Apr 2019 07:01  -  09 Apr 2019 07:00  --------------------------------------------------------  IN: 500 mL / OUT: 1100 mL / NET: -600 mL      Drug Dosing Weight  LUIS MONET      PHYSICAL EXAM:  General: Appears well developed, well nourished alert and cooperative.  HEENT: Head; normocephalic, atraumatic.  Eyes: Pupils reactive, cornea wnl.  Neck: Supple, no nodes adenopathy, no NVD or carotid bruit or thyromegaly.  CARDIOVASCULAR: Normal S1 and S2, No murmur, rub, gallop or lift.   LUNGS: No rales, rhonchi or wheeze. Normal breath sounds bilaterally.  ABDOMEN: Soft, nontender without mass or organomegaly. bowel sounds normoactive.  EXTREMITIES: No clubbing, cyanosis or edema. Distal pulses wnl.   SKIN: warm and dry with normal turgor.  NEURO: Alert/oriented x 3/normal motor exam. No pathologic reflexes.    PSYCH: normal affect.        LABS:                        10.7   6.2   )-----------( 204      ( 09 Apr 2019 06:29 )             32.4     04-09    143  |  102  |  38.0<H>  ----------------------------<  77  4.4   |  23.0  |  2.24<H>    Ca    9.7      09 Apr 2019 06:29  Phos  4.1     04-09  Mg     2.0     04-09    TPro  7.0  /  Alb  3.4  /  TBili  0.4  /  DBili  x   /  AST  34<H>  /  ALT  61<H>  /  AlkPhos  174<H>  04-09    LUIS MONET            RADIOLOGY & ADDITIONAL STUDIES:    INTERPRETATION OF TELEMETRY (personally reviewed):       ASSESSMENT AND PLAN:  In summary, Ms Monet is a 89-year-old female with a history of hypertension, hyperlipidemia, no previous coronary disease, transient Wenckebach without significant bradycardia, known moderate aortic stenosis who presents with epigastric discomfort found to have gallstone pancreatitis s/p IVF now with shortness of breath.     Shortness of breath  - suspect iatrogenic hypervolemia in patient with diastolic dysfunction and moderate AS   - lasix 40mg IV Q12 and close monitoring of renal indices        Thank you for allowing [facility] to participate in the care of this patient.  Please feel free to call with any questions or concerns. [place] [facility] - [facility], THE HEART CENTER                                   [address] [place] [address]                                                      PHONE: [phone]                                                         FAX: [phone]  [email or web address]  ---------------------------------------------------------------------------------------------------------------------------------    Overnight events/patient complaints:  no events     PAST MEDICAL & SURGICAL HISTORY:  Glaucoma  HTN (hypertension)  Breast cancer: s/p RT lumpectomy and radiation  Hypothyroid  S/P cataract extraction: bilateral  S/P lumpectomy, right breast      codeine (Vomiting)  penicillin (Rash)  Sulfacetamide Sodium (Rash)    MEDICATIONS  (STANDING):  aspirin enteric coated 81 milliGRAM(s) Oral daily  cyanocobalamin 1000 MICROGram(s) Oral daily  docusate sodium 100 milliGRAM(s) Oral two times a day  fentaNYL   Patch  25 MICROgram(s)/Hr 1 Patch Transdermal every 72 hours  folic acid 1 milliGRAM(s) Oral daily  furosemide    Tablet 40 milliGRAM(s) Oral daily  heparin  Injectable 5000 Unit(s) SubCutaneous every 12 hours  levothyroxine 25 MICROGram(s) Oral daily  pantoprazole    Tablet 40 milliGRAM(s) Oral before breakfast  senna 2 Tablet(s) Oral at bedtime    MEDICATIONS  (PRN):  acetaminophen   Tablet .. 650 milliGRAM(s) Oral every 6 hours PRN Moderate Pain (4 - 6)  ondansetron Injectable 4 milliGRAM(s) IV Push every 6 hours PRN Nausea and/or Vomiting  oxyCODONE    5 mG/acetaminophen 325 mG 1 Tablet(s) Oral every 4 hours PRN Severe Pain (7 - 10)      Vital Signs Last 24 Hrs  T(C): 36.3 (09 Apr 2019 04:39), Max: 36.4 (08 Apr 2019 16:05)  T(F): 97.3 (09 Apr 2019 04:39), Max: 97.6 (08 Apr 2019 20:20)  HR: 78 (09 Apr 2019 04:39) (71 - 80)  BP: 171/75 (09 Apr 2019 04:39) (101/62 - 171/75)  BP(mean): --  RR: 18 (08 Apr 2019 23:00) (18 - 18)  SpO2: 92% (09 Apr 2019 04:39) (92% - 97%)  ICU Vital Signs Last 24 Hrs  LUIS CHIKI  I&O's Detail    08 Apr 2019 07:01  -  09 Apr 2019 07:00  --------------------------------------------------------  IN:    Oral Fluid: 500 mL  Total IN: 500 mL    OUT:    Voided: 1100 mL  Total OUT: 1100 mL    Total NET: -600 mL        I&O's Summary    08 Apr 2019 07:01  -  09 Apr 2019 07:00  --------------------------------------------------------  IN: 500 mL / OUT: 1100 mL / NET: -600 mL      Drug Dosing Weight  LUIS CHIKI      PHYSICAL EXAM:  General: Appears well developed, well nourished alert and cooperative.  HEENT: Head; normocephalic, atraumatic.  Eyes: Pupils reactive, cornea wnl.  Neck: Supple, no nodes adenopathy, no NVD or carotid bruit or thyromegaly.  CARDIOVASCULAR: Normal S1 and S2, No murmur, rub, gallop or lift.   LUNGS: No rales, rhonchi or wheeze. Normal breath sounds bilaterally.  ABDOMEN: Soft, nontender without mass or organomegaly. bowel sounds normoactive.  EXTREMITIES: No clubbing, cyanosis or edema. Distal pulses wnl.   SKIN: warm and dry with normal turgor.  NEURO: Alert/oriented x 3/normal motor exam. No pathologic reflexes.    PSYCH: normal affect.        LABS:                        10.7   6.2   )-----------( 204      ( 09 Apr 2019 06:29 )             32.4     04-09    143  |  102  |  38.0<H>  ----------------------------<  77  4.4   |  23.0  |  2.24<H>    Ca    9.7      09 Apr 2019 06:29  Phos  4.1     04-09  Mg     2.0     04-09    TPro  7.0  /  Alb  3.4  /  TBili  0.4  /  DBili  x   /  AST  34<H>  /  ALT  61<H>  /  AlkPhos  174<H>  04-09    LUIS MONET       ASSESSMENT AND PLAN:  In summary, Ms Monet is a 89-year-old female with a history of hypertension, hyperlipidemia, no previous coronary disease, transient Wenckebach without significant bradycardia, known moderate aortic stenosis who presents with epigastric discomfort found to have gallstone pancreatitis s/p IVF now with shortness of breath.     Shortness of breath  - suspect iatrogenic hypervolemia in patient with diastolic dysfunction and moderate AS   - lasix 40mg mg po daily   -no furhter cardiac interventions  -please recall if change in clinical status     Thank you for allowing [facility] to participate in the care of this patient.  Please feel free to call with any questions or concerns.

## 2019-04-09 NOTE — CONSULT NOTE ADULT - ASSESSMENT
CKD(IV): Screat 1.63 mg/dL (02.16.16)  NAVIN with pre renal azotemia due to CHF and diuretics  No obstructive uropathy on CT scan  - avoid potential nephrotoxins  - diuretics, daily weights and monitor as needed clinically  - follow labs

## 2019-04-09 NOTE — CONSULT NOTE ADULT - SUBJECTIVE AND OBJECTIVE BOX
HPI:  89 yo WF PMH of HTN, Hypothyroid, Hx of Rt Breast CA s/p lumpectomy, glaucoma who presented to the ED with several days of progressive abdominal pain and subsequently diagnosed with gallstone pancreatitis.  Pt being treated conservatively and has had clinical improvement.  Developed CHF from IVF hydration and now maintained on diuretics.  We are called for NAVIN; pt not aware of renal disease in past.      PAST MEDICAL & SURGICAL HISTORY:  Glaucoma  HTN (hypertension)  Breast cancer: s/p RT lumpectomy and radiation  Hypothyroid  S/P cataract extraction: bilateral  S/P lumpectomy, right breast      FAMILY HISTORY:  No renal disease noted      Social History:  (+) distant tobacco; no etoh nor drugs    MEDICATIONS  (STANDING):  aspirin enteric coated 81 milliGRAM(s) Oral daily  cyanocobalamin 1000 MICROGram(s) Oral daily  docusate sodium 100 milliGRAM(s) Oral two times a day  fentaNYL   Patch  25 MICROgram(s)/Hr 1 Patch Transdermal every 72 hours  folic acid 1 milliGRAM(s) Oral daily  furosemide    Tablet 40 milliGRAM(s) Oral daily  heparin  Injectable 5000 Unit(s) SubCutaneous every 12 hours  levothyroxine 25 MICROGram(s) Oral daily  pantoprazole    Tablet 40 milliGRAM(s) Oral before breakfast  senna 2 Tablet(s) Oral at bedtime    MEDICATIONS  (PRN):  acetaminophen   Tablet .. 650 milliGRAM(s) Oral every 6 hours PRN Moderate Pain (4 - 6)  ondansetron Injectable 4 milliGRAM(s) IV Push every 6 hours PRN Nausea and/or Vomiting  oxyCODONE    5 mG/acetaminophen 325 mG 1 Tablet(s) Oral every 4 hours PRN Severe Pain (7 - 10)      Allergies    codeine (Vomiting)  penicillin (Rash)  Sulfacetamide Sodium (Rash)    Intolerances        REVIEW OF SYSTEMS:    CONSTITUTIONAL: No fever, weight loss, + fatigue  EYES: No eye pain, visual disturbances, or discharge  ENMT:  + difficulty hearing; no tinnitus, vertigo; No sinus or throat pain  NECK: No pain or stiffness  BREASTS: No pain, masses, or nipple discharge  RESPIRATORY: No cough, wheezing, chills or hemoptysis; No shortness of breath  CARDIOVASCULAR: No chest pain, palpitations, dizziness, or leg swelling  GASTROINTESTINAL: + occ abdominal and epigastric pain. No nausea, vomiting, or hematemesis; No diarrhea or constipation. No melena or hematochezia.  GENITOURINARY: No dysuria, frequency, hematuria, or incontinence  NEUROLOGICAL: No headaches, memory loss, loss of strength, numbness, or tremors  SKIN: No itching, burning, rashes, or lesions   MUSCULOSKELETAL: No joint pain or swelling; No muscle, back, or extremity pain        Vital Signs Last 24 Hrs  T(C): 36.3 (09 Apr 2019 04:39), Max: 36.4 (08 Apr 2019 16:05)  T(F): 97.3 (09 Apr 2019 04:39), Max: 97.6 (08 Apr 2019 20:20)  HR: 78 (09 Apr 2019 04:39) (71 - 80)  BP: 171/75 (09 Apr 2019 04:39) (101/62 - 171/75)  BP(mean): --  RR: 18 (08 Apr 2019 23:00) (18 - 18)  SpO2: 92% (09 Apr 2019 04:39) (92% - 97%)    PHYSICAL EXAM:    GENERAL: Elderly, frail  HEAD:  Atraumatic, Normocephalic  EYES: EOMI, PERRLA  ENMT: Moist mucous membranes, Good dentition, No lesions  NECK: Supple, No JVD, Normal thyroid  NERVOUS SYSTEM:  Alert & Oriented X3, intact and symmetric  CHEST/LUNG: Diminished BS bilaterally  HEART: Regular rate and rhythm; No rub  ABDOMEN: Soft, Nontender, Nondistended; + BS  EXTREMITIES:  2+ Peripheral Pulses, No edema  SKIN: No rashes or lesions      LABS:                        10.7   6.2   )-----------( 204      ( 09 Apr 2019 06:29 )             32.4     04-09    143  |  102  |  38.0<H>  ----------------------------<  77  4.4   |  23.0  |  2.24<H>    Creatinine, Serum: 1.63 mg/dL (02.16.16 @ 18:42)        Ca    9.7      09 Apr 2019 06:29  Phos  4.1     04-09  Mg     2.0     04-09    TPro  7.0  /  Alb  3.4  /  TBili  0.4  /  DBili  x   /  AST  34<H>  /  ALT  61<H>  /  AlkPhos  174<H>  04-09        Magnesium, Serum: 2.0 mg/dL (04-09 @ 06:29)  Phosphorus Level, Serum: 4.1 mg/dL (04-09 @ 06:29)      RADIOLOGY & ADDITIONAL TESTS:  < from: CT Abdomen and Pelvis No Cont (04.04.19 @ 17:38) >     EXAM:  CT CHEST                         EXAM:  CT ABDOMEN AND PELVIS                          PROCEDURE DATE:  04/04/2019          INTERPRETATION:  CT CHEST/ABDOMEN/PELVIS:     CLINICAL INFORMATION: Abdominal pain, nausea, hypertension, chest pain    COMPARISON: CT abdomen and pelvis of 7/15/2012.    PROCEDURE:  Using multislice helical CT, 3.0 mm sections were obtained   from the thoracic inlet through the ischial tuberosities without the   administration of intravenous contrast. Oral contrast was not   administered.    Coronal and sagittal reformations were performed by  CT technologist and   made available for review.    FINDINGS:  The visualized structures of the lower neck are unremarkable.   The visualized aorta is of normal course and caliber. The heart is not   enlarged. There is no evidence of pericardial effusion. There is   atherosclerotic calcification of the aorta. There is tracheobronchial   calcification. Calcified adenomata are seen in the superior segment of   the right lower lobe. There are small bilateral pleural effusions. There   is coronary artery calcification.    There is no evidence of axillary, hilar, or mediastinal lymphadenopathy.    No focal lung consolidations identified. No evidence of pneumothorax. No   pulmonary nodules identified. There is a moderate degree of scarring in   the lung apices with calcification in the left lung apex. There is mild   passive atelectasis at the lung bases.      The liver is of normal contour. No evidence of focal hepatic lesion on   this nonenhanced examination. The gallbladder is present. No evidence of   intra or extrahepatic biliary dilatation. Calcified gallstones are seen   in the gallbladder    The pancreas, spleen, adrenal glands, and kidneys are grossly   unremarkable. There is no evidence of hydronephrosis or perinephric   stranding. No evidence of  nephrolithiasis.The bladder is unremarkable.    No evidence of lymphadenopathy utilizing CT size criteria. The aorta is   not aneurysmal. There is significant atherosclerotic calcification of the   abdominal aorta and its branches.    There is no evidence of bowel obstruction. There is no evidence of   pneumoperitoneum or free fluid. There is diverticulosis without   diverticulitis    The visualized osseous structures demonstrate osteopenia and degenerative   changes of the spine. There is ankylosis of multiple thoracic vertebral   bodies anteriorly. There is T11 moderate compression fracture and mild   bullous of height of T12.    IMPRESSION: Small bilateral pleural effusions.  Calcified granulomata in the right lung consistent with old granulomatous   disease.  Scarring in the lung apices  No acute pathology in the abdomen or pelvis.  Cholelithiasis without CT evidence of cholecystitis  Diverticulosis without diverticulitis.   Age indeterminant compression fractures of T11 and T12.    < end of copied text >

## 2019-04-09 NOTE — PROGRESS NOTE ADULT - SUBJECTIVE AND OBJECTIVE BOX
INTERVAL HPI/OVERNIGHT EVENTS:    CC: gall stone pancreatitis, CHF, NAVIN, hypothyroid, hypertension    No overnight events, denies complaints. Wants to go home.    Vital Signs Last 24 Hrs  T(C): 36.3 (09 Apr 2019 04:39), Max: 36.4 (08 Apr 2019 16:05)  T(F): 97.3 (09 Apr 2019 04:39), Max: 97.6 (08 Apr 2019 20:20)  HR: 93 (09 Apr 2019 10:40) (71 - 93)  BP: 110/61 (09 Apr 2019 10:40) (110/61 - 171/75)  BP(mean): --  RR: 20 (09 Apr 2019 10:40) (18 - 20)  SpO2: 93% (09 Apr 2019 10:40) (92% - 97%)    PHYSICAL EXAM:    GENERAL: Not in distress, alert  CHEST/LUNG: b/l air entry  HEART: Regular   ABDOMEN: Soft, BS+  EXTREMITIES: No edema, tenderness.    MEDICATIONS  (STANDING):  aspirin enteric coated 81 milliGRAM(s) Oral daily  cyanocobalamin 1000 MICROGram(s) Oral daily  docusate sodium 100 milliGRAM(s) Oral two times a day  fentaNYL   Patch  25 MICROgram(s)/Hr 1 Patch Transdermal every 72 hours  folic acid 1 milliGRAM(s) Oral daily  heparin  Injectable 5000 Unit(s) SubCutaneous every 12 hours  levothyroxine 25 MICROGram(s) Oral daily  pantoprazole    Tablet 40 milliGRAM(s) Oral before breakfast  senna 2 Tablet(s) Oral at bedtime    MEDICATIONS  (PRN):  acetaminophen   Tablet .. 650 milliGRAM(s) Oral every 6 hours PRN Moderate Pain (4 - 6)  ondansetron Injectable 4 milliGRAM(s) IV Push every 6 hours PRN Nausea and/or Vomiting  oxyCODONE    5 mG/acetaminophen 325 mG 1 Tablet(s) Oral every 4 hours PRN Severe Pain (7 - 10)      Allergies    codeine (Vomiting)  penicillin (Rash)  Sulfacetamide Sodium (Rash)    Intolerances          LABS:                          10.7   6.2   )-----------( 204      ( 09 Apr 2019 06:29 )             32.4     04-09    143  |  102  |  38.0<H>  ----------------------------<  77  4.4   |  23.0  |  2.24<H>    Ca    9.7      09 Apr 2019 06:29  Phos  4.1     04-09  Mg     2.0     04-09    TPro  7.0  /  Alb  3.4  /  TBili  0.4  /  DBili  x   /  AST  34<H>  /  ALT  61<H>  /  AlkPhos  174<H>  04-09          RADIOLOGY & ADDITIONAL TESTS:

## 2019-04-09 NOTE — PROGRESS NOTE ADULT - ASSESSMENT
89 yr old female with hypertension, hypothyroid, breast cancer admitted with complaints of abdominal pain, nausea, vomiting. Noted to have elevated Lipase, imaging negative for pancreatitis and US abdomen noted to have cholelithiases. She was placed on IVF, pain control initiated. GI consulted. Also noted to have NAVIN. MRI/MRCP ordered by GI. Clear liquid diet started. She complained of worsening shortness of breath and hypoxia. CXR was ordered, consistent with CHF. Fluids were discontinued. ECHO ordered. Lasix was ordered, cardiology consulted. Advance directives discussed with patient, patient to think about it. Patient signed MOLST, is DNR/DNI. ECHO was done, showed evidence of diastolic dysfunction. Noted to have worsening renal function. Lasix was held, nephrology consulted. Family requested rehab placement.

## 2019-04-09 NOTE — PROGRESS NOTE ADULT - ATTENDING COMMENTS
Discussed with patient.  Updated family, requesting possible discharge to [facility], as family works all day and patient is by herself at home. Social work consulted.

## 2019-04-10 ENCOUNTER — TRANSCRIPTION ENCOUNTER (OUTPATIENT)
Age: 84
End: 2019-04-10

## 2019-04-10 VITALS
SYSTOLIC BLOOD PRESSURE: 132 MMHG | HEART RATE: 77 BPM | OXYGEN SATURATION: 97 % | RESPIRATION RATE: 20 BRPM | DIASTOLIC BLOOD PRESSURE: 77 MMHG | TEMPERATURE: 99 F

## 2019-04-10 DIAGNOSIS — E43 UNSPECIFIED SEVERE PROTEIN-CALORIE MALNUTRITION: ICD-10-CM

## 2019-04-10 LAB
ANION GAP SERPL CALC-SCNC: 18 MMOL/L — HIGH (ref 5–17)
BUN SERPL-MCNC: 50 MG/DL — HIGH (ref 8–20)
CALCIUM SERPL-MCNC: 9.7 MG/DL — SIGNIFICANT CHANGE UP (ref 8.6–10.2)
CHLORIDE SERPL-SCNC: 100 MMOL/L — SIGNIFICANT CHANGE UP (ref 98–107)
CO2 SERPL-SCNC: 27 MMOL/L — SIGNIFICANT CHANGE UP (ref 22–29)
CREAT SERPL-MCNC: 3.02 MG/DL — HIGH (ref 0.5–1.3)
GLUCOSE SERPL-MCNC: 81 MG/DL — SIGNIFICANT CHANGE UP (ref 70–115)
POTASSIUM SERPL-MCNC: 4.5 MMOL/L — SIGNIFICANT CHANGE UP (ref 3.5–5.3)
POTASSIUM SERPL-SCNC: 4.5 MMOL/L — SIGNIFICANT CHANGE UP (ref 3.5–5.3)
SODIUM SERPL-SCNC: 145 MMOL/L — SIGNIFICANT CHANGE UP (ref 135–145)

## 2019-04-10 PROCEDURE — 97116 GAIT TRAINING THERAPY: CPT

## 2019-04-10 PROCEDURE — 96374 THER/PROPH/DIAG INJ IV PUSH: CPT

## 2019-04-10 PROCEDURE — 97530 THERAPEUTIC ACTIVITIES: CPT

## 2019-04-10 PROCEDURE — 85379 FIBRIN DEGRADATION QUANT: CPT

## 2019-04-10 PROCEDURE — 99239 HOSP IP/OBS DSCHRG MGMT >30: CPT

## 2019-04-10 PROCEDURE — 96375 TX/PRO/DX INJ NEW DRUG ADDON: CPT

## 2019-04-10 PROCEDURE — 82150 ASSAY OF AMYLASE: CPT

## 2019-04-10 PROCEDURE — 84480 ASSAY TRIIODOTHYRONINE (T3): CPT

## 2019-04-10 PROCEDURE — 85610 PROTHROMBIN TIME: CPT

## 2019-04-10 PROCEDURE — 83880 ASSAY OF NATRIURETIC PEPTIDE: CPT

## 2019-04-10 PROCEDURE — 80053 COMPREHEN METABOLIC PANEL: CPT

## 2019-04-10 PROCEDURE — 74019 RADEX ABDOMEN 2 VIEWS: CPT

## 2019-04-10 PROCEDURE — 81001 URINALYSIS AUTO W/SCOPE: CPT

## 2019-04-10 PROCEDURE — 82607 VITAMIN B-12: CPT

## 2019-04-10 PROCEDURE — 82962 GLUCOSE BLOOD TEST: CPT

## 2019-04-10 PROCEDURE — 99232 SBSQ HOSP IP/OBS MODERATE 35: CPT

## 2019-04-10 PROCEDURE — 84436 ASSAY OF TOTAL THYROXINE: CPT

## 2019-04-10 PROCEDURE — 93306 TTE W/DOPPLER COMPLETE: CPT

## 2019-04-10 PROCEDURE — 99497 ADVNCD CARE PLAN 30 MIN: CPT

## 2019-04-10 PROCEDURE — 71250 CT THORAX DX C-: CPT

## 2019-04-10 PROCEDURE — 74176 CT ABD & PELVIS W/O CONTRAST: CPT

## 2019-04-10 PROCEDURE — 85027 COMPLETE CBC AUTOMATED: CPT

## 2019-04-10 PROCEDURE — 84443 ASSAY THYROID STIM HORMONE: CPT

## 2019-04-10 PROCEDURE — 71046 X-RAY EXAM CHEST 2 VIEWS: CPT

## 2019-04-10 PROCEDURE — 84100 ASSAY OF PHOSPHORUS: CPT

## 2019-04-10 PROCEDURE — 80048 BASIC METABOLIC PNL TOTAL CA: CPT

## 2019-04-10 PROCEDURE — 76705 ECHO EXAM OF ABDOMEN: CPT

## 2019-04-10 PROCEDURE — 93005 ELECTROCARDIOGRAM TRACING: CPT

## 2019-04-10 PROCEDURE — 83036 HEMOGLOBIN GLYCOSYLATED A1C: CPT

## 2019-04-10 PROCEDURE — 83735 ASSAY OF MAGNESIUM: CPT

## 2019-04-10 PROCEDURE — 71045 X-RAY EXAM CHEST 1 VIEW: CPT

## 2019-04-10 PROCEDURE — 80076 HEPATIC FUNCTION PANEL: CPT

## 2019-04-10 PROCEDURE — 80061 LIPID PANEL: CPT

## 2019-04-10 PROCEDURE — 36415 COLL VENOUS BLD VENIPUNCTURE: CPT

## 2019-04-10 PROCEDURE — 83615 LACTATE (LD) (LDH) ENZYME: CPT

## 2019-04-10 PROCEDURE — 99285 EMERGENCY DEPT VISIT HI MDM: CPT | Mod: 25

## 2019-04-10 PROCEDURE — 83690 ASSAY OF LIPASE: CPT

## 2019-04-10 PROCEDURE — 84484 ASSAY OF TROPONIN QUANT: CPT

## 2019-04-10 PROCEDURE — 85730 THROMBOPLASTIN TIME PARTIAL: CPT

## 2019-04-10 PROCEDURE — 97163 PT EVAL HIGH COMPLEX 45 MIN: CPT

## 2019-04-10 PROCEDURE — 82746 ASSAY OF FOLIC ACID SERUM: CPT

## 2019-04-10 PROCEDURE — 99497 ADVNCD CARE PLAN 30 MIN: CPT | Mod: 25

## 2019-04-10 RX ORDER — HYDRALAZINE HCL 50 MG
25 TABLET ORAL
Qty: 0 | Refills: 0 | Status: DISCONTINUED | OUTPATIENT
Start: 2019-04-10 | End: 2019-04-10

## 2019-04-10 RX ORDER — SENNA PLUS 8.6 MG/1
2 TABLET ORAL
Qty: 0 | Refills: 0 | DISCHARGE
Start: 2019-04-10

## 2019-04-10 RX ORDER — HYDRALAZINE HCL 50 MG
1 TABLET ORAL
Qty: 0 | Refills: 0 | DISCHARGE
Start: 2019-04-10

## 2019-04-10 RX ORDER — PREGABALIN 225 MG/1
1 CAPSULE ORAL
Qty: 0 | Refills: 0 | DISCHARGE
Start: 2019-04-10

## 2019-04-10 RX ORDER — PANTOPRAZOLE SODIUM 20 MG/1
1 TABLET, DELAYED RELEASE ORAL
Qty: 0 | Refills: 0 | DISCHARGE
Start: 2019-04-10

## 2019-04-10 RX ORDER — DOCUSATE SODIUM 100 MG
1 CAPSULE ORAL
Qty: 0 | Refills: 0 | DISCHARGE
Start: 2019-04-10

## 2019-04-10 RX ORDER — FOLIC ACID 0.8 MG
1 TABLET ORAL
Qty: 0 | Refills: 0 | DISCHARGE
Start: 2019-04-10

## 2019-04-10 RX ADMIN — FENTANYL CITRATE 1 PATCH: 50 INJECTION INTRAVENOUS at 07:18

## 2019-04-10 RX ADMIN — Medication 100 MILLIGRAM(S): at 05:00

## 2019-04-10 RX ADMIN — PANTOPRAZOLE SODIUM 40 MILLIGRAM(S): 20 TABLET, DELAYED RELEASE ORAL at 05:00

## 2019-04-10 RX ADMIN — ONDANSETRON 4 MILLIGRAM(S): 8 TABLET, FILM COATED ORAL at 08:56

## 2019-04-10 RX ADMIN — HEPARIN SODIUM 5000 UNIT(S): 5000 INJECTION INTRAVENOUS; SUBCUTANEOUS at 05:01

## 2019-04-10 RX ADMIN — Medication 25 MICROGRAM(S): at 05:00

## 2019-04-10 NOTE — PROGRESS NOTE ADULT - PROBLEM SELECTOR PROBLEM 5
Other congestive heart failure

## 2019-04-10 NOTE — PROGRESS NOTE ADULT - ATTENDING COMMENTS
Discussed with family, MATTY, RN, palliative care and hospice. Anticipate discharge to [facility] once hospice and palliative care discussions completed.

## 2019-04-10 NOTE — PROGRESS NOTE ADULT - PROVIDER SPECIALTY LIST ADULT
Cardiology
Cardiology
Gastroenterology
Hospitalist
Nephrology
Palliative Care
Palliative Care
Hospitalist

## 2019-04-10 NOTE — CDI QUERY NOTE - NSCDIOTHERTXTBX_GEN_ALL_CORE_HH
Nutrition Consult this AM = severe protein calorie malnutrition  persistent lack of appetite, persistent nausea/vomiting  underweight, moderate wasting in temples, orbital, clavicles noted  BMI 15.5  Ensure Enlive, Pulmocare and TID recommended        If you agree with dietary, please include diagnosis in progress note and discharge summary, and/or sign consult.          Thank you,

## 2019-04-10 NOTE — DIETITIAN INITIAL EVALUATION ADULT. - PERTINENT LABORATORY DATA
04-10 Na145 mmol/L Glu 81 mg/dL K+ 4.5 mmol/L Cr  3.02 mg/dL<H> BUN 50.0 mg/dL<H> Phos n/a   Alb n/a   PAB n/a

## 2019-04-10 NOTE — DISCHARGE NOTE NURSING/CASE MANAGEMENT/SOCIAL WORK - NSDCDPATPORTLINK_GEN_ALL_CORE
You can access the [facility] Patient Portal, offered by [facility], by registering with the following website: [email or web address]

## 2019-04-10 NOTE — PROGRESS NOTE ADULT - PROBLEM SELECTOR PROBLEM 1
Gallstone pancreatitis

## 2019-04-10 NOTE — PROGRESS NOTE ADULT - SUBJECTIVE AND OBJECTIVE BOX
FOLLOW UP VISIT, goc, sosito mgnt    INTERVAL HPI/OVERNIGHT EVENTS:  Seen and examined this morning and again this afternoon. Family present.   Pt reports doing well, offered no acute complaints except feeling tired.     Present Symptoms:   Dyspnea:  No   Nausea/Vomiting:  yes earlier in AM  Anxiety:   No  Fatigue: Yes    Constipation: yes  Insomnia: no  Pain: No    Review of Systems: Reviewed, All others negative  +visual deficit leeanne Rt eye, chronic  +mild [place]    MEDICATIONS  (STANDING):  aspirin enteric coated 81 milliGRAM(s) Oral daily  cyanocobalamin 1000 MICROGram(s) Oral daily  docusate sodium 100 milliGRAM(s) Oral two times a day  fentaNYL   Patch  25 MICROgram(s)/Hr 1 Patch Transdermal every 72 hours  folic acid 1 milliGRAM(s) Oral daily  heparin  Injectable 5000 Unit(s) SubCutaneous every 12 hours  hydrALAZINE 25 milliGRAM(s) Oral two times a day  levothyroxine 25 MICROGram(s) Oral daily  pantoprazole    Tablet 40 milliGRAM(s) Oral before breakfast  senna 2 Tablet(s) Oral at bedtime    MEDICATIONS  (PRN):  acetaminophen   Tablet .. 650 milliGRAM(s) Oral every 6 hours PRN Moderate Pain (4 - 6)  ondansetron Injectable 4 milliGRAM(s) IV Push every 6 hours PRN Nausea and/or Vomiting  oxyCODONE    5 mG/acetaminophen 325 mG 1 Tablet(s) Oral every 4 hours PRN Severe Pain (7 - 10)      PHYSICAL EXAM:  Vital Signs Last 24 Hrs  T(C): 36.3 (10 Apr 2019 11:10), Max: 36.8 (09 Apr 2019 16:34)  T(F): 97.3 (10 Apr 2019 11:10), Max: 98.3 (09 Apr 2019 16:34)  HR: 83 (10 Apr 2019 11:10) (83 - 90)  BP: 141/61 (10 Apr 2019 11:10) (128/56 - 141/61)  BP(mean): --  RR: 18 (10 Apr 2019 11:10) (18 - 20)  SpO2: 96% (10 Apr 2019 11:10) (91% - 98%)    General: Resting comfortably. No acute distress.   HEENT: mucous membrane moist    Lungs: ctab. no rales, rhonchi, wheezing. non-labored.   CV: +s1/s2. Regular rate and rhythm. + murmurs  GI: +bowel sound. abdomen soft, non-tender, non-distended   MSK: Moves all 4 extremities. No  cyanosis or edema. weakness  Neuro: Sleepy but wakes easily to verbal stimuli. Interactive.  Skin: warm and dry.        LABS:                          10.7   6.2   )-----------( 204      ( 09 Apr 2019 06:29 )             32.4   04-10    145  |  100  |  50.0<H>  ----------------------------<  81  4.5   |  27.0  |  3.02<H>    Ca    9.7      10 Apr 2019 06:52  Phos  4.1     04-09  Mg     2.0     04-09    TPro  7.0  /  Alb  3.4  /  TBili  0.4  /  DBili  x   /  AST  34<H>  /  ALT  61<H>  /  AlkPhos  174<H>  04-09    I&O's Summary    09 Apr 2019 07:01  -  10 Apr 2019 07:00  --------------------------------------------------------  IN: 710 mL / OUT: 0 mL / NET: 710 mL      RADIOLOGY & ADDITIONAL STUDIES: reviewed, no new recent studies

## 2019-04-10 NOTE — CHART NOTE - NSCHARTNOTEFT_GEN_A_CORE
Upon Nutritional Assessment by the Registered Dietitian your patient was determined to meet criteria / has evidence of the following diagnosis/diagnoses:          [ ]  Mild Protein Calorie Malnutrition        [ ]  Moderate Protein Calorie Malnutrition        [ x] Severe Protein Calorie Malnutrition  CHRONIC        [ ] Unspecified Protein Calorie Malnutrition        [ ] Underweight / BMI <19        [ ] Morbid Obesity / BMI > 40      Findings as based on:  •  Comprehensive nutrition assessment and consultation  •  Calorie counts (nutrient intake analysis)  •  Food acceptance and intake status from observations by staff  •  Follow up  •  Patient education  •  Intervention secondary to interdisciplinary rounds  •   concerns      Treatment:    The following diet has been recommended: Rec Ensure TID      PROVIDER Section:     By signing this assessment you are acknowledging and agree with the diagnosis/diagnoses assigned by the Registered Dietitian    Comments:

## 2019-04-10 NOTE — PROGRESS NOTE ADULT - PROBLEM SELECTOR PLAN 5
Improved, supplemental oxygen, continue PO Lasix. Cardiology follow up noted.
Improved respiratory status. Will continue Lasix, awaiting ECHO and cardiology evaluation. Monitor on telemetry.
Patient has acute on chronic diastolic CHF. Hold Lasix, supplemental oxygen.
Stable, supplemental oxygen. Hold Lasix.  Patient has acute on chronic diastolic CHF.
Start IV Lasix, strict I/O, cardiology evaluation. Will transfer to a monitored bed. ECHO ordered.

## 2019-04-10 NOTE — PROGRESS NOTE ADULT - REASON FOR ADMISSION
acute pancreatitis

## 2019-04-10 NOTE — PROGRESS NOTE ADULT - PROBLEM SELECTOR PLAN 3
Continue synthroid.

## 2019-04-10 NOTE — GOALS OF CARE CONVERSATION - PERSONAL ADVANCE DIRECTIVE - CONVERSATION DETAILS
Met with pt's family this morning and again with hospice team this afternoon. Family express good understanding of pt's comorbidities, hospital course, treatment, overall poor prognosis and plan of care moving forward. Family acknowledge and verbalized concern that pt is overall not doing well given tenuous state especially worsening renal function and would not want HD down the road. Alternative options explored including comfort care and hospice services; explained in detail.  Family desire for pt to be discharge to [facility] at [place] but understand high risk for decompensation and rehospitalization. If pt should clinically decline or does not meet family's expectation; they may request for transition to hospice services at that time; contact info provided. MOLST updated with family for DNR/DNI, limited interventions, no rehospitalization; witness by hospice RN Madina. All questions answered and emotional support provided. Per SW, anticipate discharge later this evening.

## 2019-04-10 NOTE — PROGRESS NOTE ADULT - ASSESSMENT
CKD(IV): Serum Cr 1.63 mg/dL (02.16.16)  NAVIN with pre renal azotemia suspect 2/2 CHF and diuretics  No obstructive uropathy on CT scan  cr worsening daily   I had discussion w pt and family at bedside they do not want HD should need arise  They are considering hospice  - avoid potential nephrotoxins  - diuretics, daily weights and monitor as needed clinically      Will follow

## 2019-04-10 NOTE — PROGRESS NOTE ADULT - SUBJECTIVE AND OBJECTIVE BOX
NEPHROLOGY INTERVAL HPI/OVERNIGHT EVENTS:    Examined   Frail  Family at bedside    MEDICATIONS  (STANDING):  aspirin enteric coated 81 milliGRAM(s) Oral daily  cyanocobalamin 1000 MICROGram(s) Oral daily  docusate sodium 100 milliGRAM(s) Oral two times a day  fentaNYL   Patch  25 MICROgram(s)/Hr 1 Patch Transdermal every 72 hours  folic acid 1 milliGRAM(s) Oral daily  heparin  Injectable 5000 Unit(s) SubCutaneous every 12 hours  levothyroxine 25 MICROGram(s) Oral daily  pantoprazole    Tablet 40 milliGRAM(s) Oral before breakfast  senna 2 Tablet(s) Oral at bedtime    MEDICATIONS  (PRN):  acetaminophen   Tablet .. 650 milliGRAM(s) Oral every 6 hours PRN Moderate Pain (4 - 6)  ondansetron Injectable 4 milliGRAM(s) IV Push every 6 hours PRN Nausea and/or Vomiting  oxyCODONE    5 mG/acetaminophen 325 mG 1 Tablet(s) Oral every 4 hours PRN Severe Pain (7 - 10)      Allergies    codeine (Vomiting)  penicillin (Rash)  Sulfacetamide Sodium (Rash)    Intolerances        Vital Signs Last 24 Hrs  T(C): 36.3 (10 Apr 2019 11:10), Max: 36.8 (2019 16:34)  T(F): 97.3 (10 Apr 2019 11:10), Max: 98.3 (2019 16:34)  HR: 83 (10 Apr 2019 11:10) (83 - 90)  BP: 141/61 (10 Apr 2019 11:10) (128/56 - 141/61)  BP(mean): --  RR: 18 (10 Apr 2019 11:10) (18 - 20)  SpO2: 96% (10 Apr 2019 11:10) (91% - 98%)  Daily     Daily Weight in k.9 (10 Apr 2019 09:58)    PHYSICAL EXAM:  GENERAL: Elderly, frail  HEAD:  NCAT  EYES: EOMI  NECK: Supple, No JVD, Normal thyroid  NERVOUS SYSTEM:  Awake   CHEST/LUNG: Diminished BS bilaterally  HEART: Regular rate and rhythm; No rub  ABDOMEN: Soft, Nontender, Nondistended; + BS  EXTREMITIES:  No edema    LABS:                        10.7   6.2   )-----------( 204      ( 2019 06:29 )             32.4     04-10    145  |  100  |  50.0<H>  ----------------------------<  81  4.5   |  27.0  |  3.02<H>    Ca    9.7      10 Apr 2019 06:52  Phos  4.1       Mg     2.0         TPro  7.0  /  Alb  3.4  /  TBili  0.4  /  DBili  x   /  AST  34<H>  /  ALT  61<H>  /  AlkPhos  174<H>                  RADIOLOGY & ADDITIONAL TESTS:

## 2019-04-10 NOTE — DIETITIAN INITIAL EVALUATION ADULT. - ETIOLOGY
related to inadequate energy intake in setting of gall stone pancreatitis, CHF, NAVIN, hypothyroid, hypertension, advanced age

## 2019-04-10 NOTE — PROGRESS NOTE ADULT - SUBJECTIVE AND OBJECTIVE BOX
INTERVAL HPI/OVERNIGHT EVENTS:    CC:  gall stone pancreatitis, CHF, NAVIN, hypothyroid, hypertension    Has poor appetite, mild nausea.     Vital Signs Last 24 Hrs  T(C): 36.3 (10 Apr 2019 11:10), Max: 36.8 (09 Apr 2019 16:34)  T(F): 97.3 (10 Apr 2019 11:10), Max: 98.3 (09 Apr 2019 16:34)  HR: 83 (10 Apr 2019 11:10) (83 - 90)  BP: 141/61 (10 Apr 2019 11:10) (128/56 - 141/61)  BP(mean): --  RR: 18 (10 Apr 2019 11:10) (18 - 20)  SpO2: 96% (10 Apr 2019 11:10) (91% - 98%)    PHYSICAL EXAM:    GENERAL: Not in distress, alert  CHEST/LUNG: b/l air entry  HEART: Regular   ABDOMEN: Soft, BS+  EXTREMITIES: no edema, tenderness.    MEDICATIONS  (STANDING):  aspirin enteric coated 81 milliGRAM(s) Oral daily  cyanocobalamin 1000 MICROGram(s) Oral daily  docusate sodium 100 milliGRAM(s) Oral two times a day  fentaNYL   Patch  25 MICROgram(s)/Hr 1 Patch Transdermal every 72 hours  folic acid 1 milliGRAM(s) Oral daily  heparin  Injectable 5000 Unit(s) SubCutaneous every 12 hours  levothyroxine 25 MICROGram(s) Oral daily  pantoprazole    Tablet 40 milliGRAM(s) Oral before breakfast  senna 2 Tablet(s) Oral at bedtime    MEDICATIONS  (PRN):  acetaminophen   Tablet .. 650 milliGRAM(s) Oral every 6 hours PRN Moderate Pain (4 - 6)  ondansetron Injectable 4 milliGRAM(s) IV Push every 6 hours PRN Nausea and/or Vomiting  oxyCODONE    5 mG/acetaminophen 325 mG 1 Tablet(s) Oral every 4 hours PRN Severe Pain (7 - 10)      Allergies    codeine (Vomiting)  penicillin (Rash)  Sulfacetamide Sodium (Rash)    Intolerances          LABS:                          10.7   6.2   )-----------( 204      ( 09 Apr 2019 06:29 )             32.4     04-10    145  |  100  |  50.0<H>  ----------------------------<  81  4.5   |  27.0  |  3.02<H>    Ca    9.7      10 Apr 2019 06:52  Phos  4.1     04-09  Mg     2.0     04-09    TPro  7.0  /  Alb  3.4  /  TBili  0.4  /  DBili  x   /  AST  34<H>  /  ALT  61<H>  /  AlkPhos  174<H>  04-09          RADIOLOGY & ADDITIONAL TESTS:

## 2019-04-10 NOTE — PROGRESS NOTE ADULT - PROBLEM SELECTOR PROBLEM 4
NAVIN (acute kidney injury)

## 2019-04-10 NOTE — DIETITIAN INITIAL EVALUATION ADULT. - PHYSICAL APPEARANCE
appears thin, NFPE limited, moderate wasting in temples, orbital, clavicles noted from what was visual./underweight

## 2019-04-10 NOTE — DIETITIAN INITIAL EVALUATION ADULT. - OTHER INFO
BMI 15.5, UBW stated by daughter is 84#, bedscale weight is 77#, pt with noted poor po intake PTA. Also pt with nausea and vomiting currently.

## 2019-04-10 NOTE — PROGRESS NOTE ADULT - PROBLEM SELECTOR PLAN 6
Discussed with patient at length, SARAH signed. Patient is DNR/DNI.
Palliative care follow up in am.
Patient is DNR/DNI.
Patient is DNR/DNI. Patient is weak and unable to engage in detailed conversation about goals of care. Explained to daughters Anna and Morena and grand daughter Nat. Stated that she has gall stones, which she did not want intervention for. Has underlying CHF and now worsening renal function. Admission to rehab, also places at her at risk for readmissions. Discussed to consider hospice/comfort care as an option in the 89 yr old. They are amenable to it and wish that patient be comfortable and hopefully able to return home eventually. Hospice and palliative care consults requested.   Spent > 35 mins in advance care planning and goals of care discussion.
Discussed with patient and daughter at bedside, no decision made at this time. Palliative care eval noted.

## 2019-04-10 NOTE — DIETITIAN INITIAL EVALUATION ADULT. - SIGNS/SYMPTOMS
as evidenced by <75%intake >1 mo, 7# weight loss recently, altered GI function, muscle/fat depletion

## 2019-04-10 NOTE — PROGRESS NOTE ADULT - ASSESSMENT
89 yr old female with hypertension, hypothyroid, breast cancer admitted with complaints of abdominal pain, nausea, vomiting. Noted to have elevated Lipase, imaging negative for pancreatitis and US abdomen noted to have cholelithiases. She was placed on IVF, pain control initiated. GI consulted. Also noted to have NAVIN. MRI/MRCP ordered by GI. Clear liquid diet started. She complained of worsening shortness of breath and hypoxia. CXR was ordered, consistent with CHF. Fluids were discontinued. ECHO ordered. Lasix was ordered, cardiology consulted. Advance directives discussed with patient, patient to think about it. Patient signed MOLST, is DNR/DNI. ECHO was done, showed evidence of diastolic dysfunction. Noted to have worsening renal function. Lasix was held, nephrology consulted. Family requested rehab placement. Patient with poor oral intake and worsening renal function. Discussed with family goals of care, given advanced age, worsening renal function, underlying CHF and gall stone pancreatitis which she did not want intervention. Patient weak to express her wishes. After length discussion with daughters, they wish to speak with hospice. Palliative care follow up was requested.

## 2019-04-10 NOTE — PROGRESS NOTE ADULT - ASSESSMENT
Mrs. Figueroa is a 89 year old female admitted for acute pancreatitis. Course complicated by acute respiratory failure with hypoxia secondary to pulmonary congestion on CXR, s/p diuresis and O2 supplement, now improved and progressive worsening renal dysfunction.    PLAN    Acute pancreatitis  - pt refused any further studies and surgical interventions/cholecystectomy  - c/w zofran PRN N/V    Acute decompensated Heart Failure  - c/w diuretics and O2 supplement PRN    Acute Pain  - c/w TD fentanyl 25 mcg q72H and oxycodone/APAP 5 mg q4H PRN  - c/w senna and colace for constipation prophylaxis    NAVIN  - worsening renal function   - renal on board, avoid nephrotoxic agents, serial Cr    Palliative Care Encounter  Please refer to [facility] note for full detail.  - Family desires for NAN in hopes to improve her debility, understand high risk of decompensation and limitations to participation; comfort care and hospice explained in detail; further details provided during hospice team meeting today  - MOLST updated for DNR/DNI, limited interventions, no rehospitalization  - D/W , anticipate discharge to [facility] later this evening

## 2019-04-15 ENCOUNTER — RX RENEWAL (OUTPATIENT)
Age: 84
End: 2019-04-15

## 2019-06-01 ENCOUNTER — INPATIENT (INPATIENT)
Facility: HOSPITAL | Age: 84
LOS: 5 days | Discharge: OTHER INSTITUTION | DRG: 291 | End: 2019-06-07
Attending: INTERNAL MEDICINE | Admitting: STUDENT IN AN ORGANIZED HEALTH CARE EDUCATION/TRAINING PROGRAM
Payer: MEDICARE

## 2019-06-01 VITALS
DIASTOLIC BLOOD PRESSURE: 82 MMHG | TEMPERATURE: 98 F | SYSTOLIC BLOOD PRESSURE: 196 MMHG | RESPIRATION RATE: 18 BRPM | OXYGEN SATURATION: 95 % | HEART RATE: 94 BPM

## 2019-06-01 DIAGNOSIS — I50.9 HEART FAILURE, UNSPECIFIED: ICD-10-CM

## 2019-06-01 DIAGNOSIS — Z98.89 OTHER SPECIFIED POSTPROCEDURAL STATES: Chronic | ICD-10-CM

## 2019-06-01 DIAGNOSIS — Z98.49 CATARACT EXTRACTION STATUS, UNSPECIFIED EYE: Chronic | ICD-10-CM

## 2019-06-01 LAB
ALBUMIN SERPL ELPH-MCNC: 3.8 G/DL — SIGNIFICANT CHANGE UP (ref 3.3–5.2)
ALP SERPL-CCNC: 133 U/L — HIGH (ref 40–120)
ALT FLD-CCNC: 22 U/L — SIGNIFICANT CHANGE UP
ANION GAP SERPL CALC-SCNC: 13 MMOL/L — SIGNIFICANT CHANGE UP (ref 5–17)
APTT BLD: 44.1 SEC — HIGH (ref 27.5–36.3)
AST SERPL-CCNC: 26 U/L — SIGNIFICANT CHANGE UP
BASOPHILS # BLD AUTO: 0 K/UL — SIGNIFICANT CHANGE UP (ref 0–0.2)
BASOPHILS NFR BLD AUTO: 0.4 % — SIGNIFICANT CHANGE UP (ref 0–2)
BILIRUB SERPL-MCNC: 0.5 MG/DL — SIGNIFICANT CHANGE UP (ref 0.4–2)
BUN SERPL-MCNC: 31 MG/DL — HIGH (ref 8–20)
CALCIUM SERPL-MCNC: 9.8 MG/DL — SIGNIFICANT CHANGE UP (ref 8.6–10.2)
CHLORIDE SERPL-SCNC: 111 MMOL/L — HIGH (ref 98–107)
CO2 SERPL-SCNC: 19 MMOL/L — LOW (ref 22–29)
CREAT SERPL-MCNC: 1.88 MG/DL — HIGH (ref 0.5–1.3)
EOSINOPHIL # BLD AUTO: 0 K/UL — SIGNIFICANT CHANGE UP (ref 0–0.5)
EOSINOPHIL NFR BLD AUTO: 0.4 % — SIGNIFICANT CHANGE UP (ref 0–6)
GLUCOSE SERPL-MCNC: 81 MG/DL — SIGNIFICANT CHANGE UP (ref 70–115)
HCT VFR BLD CALC: 32.4 % — LOW (ref 37–47)
HGB BLD-MCNC: 10.6 G/DL — LOW (ref 12–16)
INR BLD: 0.86 RATIO — LOW (ref 0.88–1.16)
LIDOCAIN IGE QN: 105 U/L — HIGH (ref 22–51)
LYMPHOCYTES # BLD AUTO: 0.8 K/UL — LOW (ref 1–4.8)
LYMPHOCYTES # BLD AUTO: 15.3 % — LOW (ref 20–55)
MCHC RBC-ENTMCNC: 32.7 G/DL — SIGNIFICANT CHANGE UP (ref 32–36)
MCHC RBC-ENTMCNC: 33.9 PG — HIGH (ref 27–31)
MCV RBC AUTO: 103.5 FL — HIGH (ref 81–99)
MONOCYTES # BLD AUTO: 0.6 K/UL — SIGNIFICANT CHANGE UP (ref 0–0.8)
MONOCYTES NFR BLD AUTO: 11.8 % — HIGH (ref 3–10)
NEUTROPHILS # BLD AUTO: 3.7 K/UL — SIGNIFICANT CHANGE UP (ref 1.8–8)
NEUTROPHILS NFR BLD AUTO: 72.1 % — SIGNIFICANT CHANGE UP (ref 37–73)
NT-PROBNP SERPL-SCNC: 4297 PG/ML — HIGH (ref 0–300)
PLATELET # BLD AUTO: 215 K/UL — SIGNIFICANT CHANGE UP (ref 150–400)
POTASSIUM SERPL-MCNC: 6 MMOL/L — HIGH (ref 3.5–5.3)
POTASSIUM SERPL-SCNC: 6 MMOL/L — HIGH (ref 3.5–5.3)
PROT SERPL-MCNC: 7.5 G/DL — SIGNIFICANT CHANGE UP (ref 6.6–8.7)
PROTHROM AB SERPL-ACNC: 9.9 SEC — LOW (ref 10–12.9)
RBC # BLD: 3.13 M/UL — LOW (ref 4.4–5.2)
RBC # FLD: 15.6 % — SIGNIFICANT CHANGE UP (ref 11–15.6)
SODIUM SERPL-SCNC: 143 MMOL/L — SIGNIFICANT CHANGE UP (ref 135–145)
TROPONIN T SERPL-MCNC: 0.02 NG/ML — SIGNIFICANT CHANGE UP (ref 0–0.06)
WBC # BLD: 5.1 K/UL — SIGNIFICANT CHANGE UP (ref 4.8–10.8)
WBC # FLD AUTO: 5.1 K/UL — SIGNIFICANT CHANGE UP (ref 4.8–10.8)

## 2019-06-01 PROCEDURE — 93010 ELECTROCARDIOGRAM REPORT: CPT

## 2019-06-01 PROCEDURE — 99285 EMERGENCY DEPT VISIT HI MDM: CPT

## 2019-06-01 PROCEDURE — 71045 X-RAY EXAM CHEST 1 VIEW: CPT | Mod: 26

## 2019-06-01 PROCEDURE — 99223 1ST HOSP IP/OBS HIGH 75: CPT | Mod: GC

## 2019-06-01 RX ORDER — INSULIN HUMAN 100 [IU]/ML
5 INJECTION, SOLUTION SUBCUTANEOUS ONCE
Refills: 0 | Status: COMPLETED | OUTPATIENT
Start: 2019-06-01 | End: 2019-06-01

## 2019-06-01 RX ORDER — FUROSEMIDE 40 MG
20 TABLET ORAL ONCE
Refills: 0 | Status: COMPLETED | OUTPATIENT
Start: 2019-06-01 | End: 2019-06-01

## 2019-06-01 RX ORDER — SODIUM BICARBONATE 1 MEQ/ML
50 SYRINGE (ML) INTRAVENOUS ONCE
Refills: 0 | Status: COMPLETED | OUTPATIENT
Start: 2019-06-01 | End: 2019-06-01

## 2019-06-01 RX ORDER — CALCIUM GLUCONATE 100 MG/ML
1 VIAL (ML) INTRAVENOUS ONCE
Refills: 0 | Status: COMPLETED | OUTPATIENT
Start: 2019-06-01 | End: 2019-06-01

## 2019-06-01 RX ORDER — ASPIRIN/CALCIUM CARB/MAGNESIUM 324 MG
324 TABLET ORAL DAILY
Refills: 0 | Status: DISCONTINUED | OUTPATIENT
Start: 2019-06-01 | End: 2019-06-02

## 2019-06-01 RX ORDER — DEXTROSE 50 % IN WATER 50 %
50 SYRINGE (ML) INTRAVENOUS ONCE
Refills: 0 | Status: COMPLETED | OUTPATIENT
Start: 2019-06-01 | End: 2019-06-01

## 2019-06-01 RX ADMIN — Medication 50 MILLILITER(S): at 22:31

## 2019-06-01 RX ADMIN — Medication 200 GRAM(S): at 22:45

## 2019-06-01 RX ADMIN — Medication 1 GRAM(S): at 23:18

## 2019-06-01 RX ADMIN — INSULIN HUMAN 5 UNIT(S): 100 INJECTION, SOLUTION SUBCUTANEOUS at 22:32

## 2019-06-01 RX ADMIN — Medication 50 MILLIEQUIVALENT(S): at 22:32

## 2019-06-01 RX ADMIN — Medication 324 MILLIGRAM(S): at 20:17

## 2019-06-01 RX ADMIN — Medication 20 MILLIGRAM(S): at 22:31

## 2019-06-01 NOTE — H&P ADULT - NEGATIVE CARDIOVASCULAR SYMPTOMS
Wound Care: Bacitracin Biopsy Type: H and E Destruction After The Procedure: No Lab Facility: [id number] Dressing: bandage Detail Level: Detailed Electrodesiccation Text: The wound bed was treated with electrodesiccation after the biopsy was performed. Silver Nitrate Text: The wound bed was treated with silver nitrate after the biopsy was performed. Anesthesia Type: 1% lidocaine with 1:100,000 epinephrine and a 1:10 solution of 8.4% sodium bicarbonate Body Location Override (Optional - Billing Will Still Be Based On Selected Body Map Location If Applicable): left preauricular Hemostasis: Drysol Biopsy Method: Personna blade Render Post-Care Instructions In Note?: yes Curettage Text: The wound bed was treated with curettage after the biopsy was performed. Post-Care Instructions: I reviewed with the patient in detail post-care instructions. Additional Anesthesia Volume In Cc (Will Not Render If 0): 0 Type Of Destruction Used: Curettage Path Notes (To The Dermatopathologist): Benjamin no palpitations/no peripheral edema/no orthopnea/no paroxysmal nocturnal dyspnea Lab: [id number] Anesthesia Volume In Cc (Will Not Render If 0): 0.5 Anticipated Plan (Based On Presumed Biopsy Results): Mohs Cryotherapy Text: The wound bed was treated with cryotherapy after the biopsy was performed. Electrodesiccation And Curettage Text: The wound bed was treated with electrodesiccation and curettage after the biopsy was performed. Billing Type: Third-Party Bill X Size Of Lesion In Cm: 0.3 Size Of Lesion In Cm: 0.4 Notification Instructions: Patient will be notified of biopsy results.

## 2019-06-01 NOTE — ED PROVIDER NOTE - PROGRESS NOTE DETAILS
K 6. ordered calcium gluconate 1 g IV, insulin 5 U IV, D50, sodium bicab 1 amp. pro-bnp elevated, ordered lasix 20 mg IV. I spoke to [facility], will need admission. There was a prior note for palliative care. DNR/DNI and no hospitalization. I clarified with family, they wasn't aware about no hospitalization during last conversation. Family agreeable to admission. I spoke hospitalist for admission.

## 2019-06-01 NOTE — ED ADULT NURSE NOTE - OBJECTIVE STATEMENT
per pt she has chest pressure increase BRUNO, increase SOB, pt O2 is maintaining on RA >92 on the monitor pt moving all extremities bilaterally no acute distress noted pt denies N/V/D denies fevers sweats chills at this time. pt is on monitor bed in lowest position call bell within reach

## 2019-06-01 NOTE — H&P ADULT - RS GEN PE MLT RESP DETAILS PC
no intercostal retractions/respirations non-labored/no wheezes/rales/no rhonchi respirations non-labored/chest wall tenderness/no intercostal retractions/no rhonchi/no wheezes/rales

## 2019-06-01 NOTE — ED PROVIDER NOTE - NS ED ROS FT
Review of Systems  •	CONSTITUTIONAL - no  fever, no diaphoresis, no weight change  •	SKIN - no rash  •	HEMATOLOGIC - no bleeding, no bruising  •	EYES - no eye pain, no blurred vision  •	ENT - no change in hearing, no pain  •	RESPIRATORY - (+) shortness of breath, no cough  •	CARDIAC - (+)chest pain, no palpitations  •	GI - no abd pain, no nausea, no vomiting, no diarrhea, no constipation, no bleeding  •	GENITO-URINARY - no discharge, no dysuria; no hematuria,   •	ENDO - no polydypsia, no polyurea, no heat/no cold intolerance  •	MUSCULOSKELETAL - no joint pain, no swelling, no redness  •	NEUROLOGIC - no weakness, no headache, no anesthesia, no paresthesias  •	PSYCH - no anxiety, non suicidal, non homicidal, no hallucination, no depression

## 2019-06-01 NOTE — H&P ADULT - NSHPPHYSICALEXAM_GEN_ALL_CORE
Vital Signs Last 24 Hrs  T(C): 36.7 (02 Jun 2019 01:02), Max: 36.7 (02 Jun 2019 01:02)  T(F): 98 (02 Jun 2019 01:02), Max: 98 (02 Jun 2019 01:02)  HR: 93 (02 Jun 2019 01:02) (93 - 94)  BP: 175/68 (02 Jun 2019 01:02) (175/68 - 196/82)  RR: 16 (02 Jun 2019 01:02) (16 - 18)  SpO2: 93% (02 Jun 2019 01:02) (93% - 95%)

## 2019-06-01 NOTE — H&P ADULT - NSHPSOCIALHISTORY_GEN_ALL_CORE
Lives with family at home. Denies alcohol or illicit drug use. Smoked cigarettes 40 yrs ago, smoked 1 ppd for 20 yrs. Ambulates with walker.

## 2019-06-01 NOTE — H&P ADULT - ATTENDING COMMENTS
Briefly 89yoF hx AS, HFpEF being admitted for suspected acute on chronic diastolic heart failure, gave trial of gentle diuresis with lasix 20mg and will have team reasses volume status in AM.  Cards consulted. Also with incidental finding of mild acute diverticulitis and acute appearing nondisplaced subcapital left femoral neck fracture noted on CT abd/pelvis.  During course in ED, became hypothermic.  Obtain blood cultures, start antibiotics for diverticulitis.  Ortho consulted regarding femoral fracture.  Pt also DNR/DNI, MOLST form filled out during previous admission, daughter Morena advised to bring in document.  Pt had palliative care consult during previous admission, 'no rehospitalization' and hospice was mentioned, however daughter Morena said she did not agree to 'no rehospitalization' and she currently wishes for pt to be hospitalized with certain medical interventions including IVF, diuretics, antibiotics, etc.

## 2019-06-01 NOTE — H&P ADULT - HISTORY OF PRESENT ILLNESS
89 yo female with pmhx of moderate AS, hypertension, hypothyroid, breast cancer s/p lumpectomy and radiation in 2007, gallstone pancreatitis in April 2019 presents to ED with complaint of chest pressure that began last night. She says it feels like "someone is sitting on my chest." It is nonradiating, not worsened by breathing or position. In addition she reports SOB that began last night as well. She says she can walk to bathroom and then feel like she needs to lie down. She denies orthopnea, PND, palpitations, N/V, or calf pain. She was hospitalized in April at [facility] with gallstone pancreatitis which resolved, and NAVIN on CKD.

## 2019-06-01 NOTE — ED PROVIDER NOTE - OBJECTIVE STATEMENT
89 yo F hx of HTN p/w midsternal chest pressure. 89 yo F hx of HTN p/w midsternal chest pressure and sob started today. no fever, no cough. Family report hx of pancreatitis but no surgery to be done due to her age. Patient report the pain is different than her pancreatitic pain.     cards:  ([facility])

## 2019-06-01 NOTE — ED PROVIDER NOTE - PHYSICAL EXAMINATION
VITAL SIGNS: I have reviewed nursing notes and confirm.  CONSTITUTIONAL:  (+) cachectic  in no acute distress.  SKIN: Skin exam is warm and dry, no acute rash.  HEAD: Normocephalic; atraumatic.  EYES: PERRL, EOM intact; conjunctiva and sclera clear.  ENT: No nasal discharge; airway clear. Throat clear.  NECK: Supple; non tender.    CARD: S1, S2 normal; no murmurs, gallops, or rubs. Regular rate and rhythm.  RESP: No wheezes,  no rales or rhonchi.   ABD:  soft; non-distended; non-tender;   EXT: Normal ROM. No clubbing, cyanosis or edema.  NEURO: Alert, oriented. Grossly unremarkable. No focal deficits. no facial droop, moves all extremities,    PSYCH: Cooperative, appropriate.

## 2019-06-01 NOTE — H&P ADULT - ASSESSMENT
89 yo female with pmhx of Chronic diastolic CHF, moderate AS, hypertension, hypothyroid, breast cancer s/p lumpectomy and radiation in 2007, gallstone pancreatitis in April 2019 presents to ED with complaint of chest pressure, SOB and elevated proBNP concerning for acute CHF exacerbation.     Acute diastolic CHF  -admit to telemetry  -trop negative x 1, trop x 2 pending  -start lasix 20 mg iv x 1 dose  -strict i/os, daily weights  -supplemental O2  -repeat EKG in am 89 yo female with pmhx of Chronic diastolic CHF, moderate AS, hypertension, hypothyroid, breast cancer s/p lumpectomy and radiation in 2007, gallstone pancreatitis in April 2019 presents to ED with complaint of chest pressure, SOB and elevated proBNP concerning for acute CHF exacerbation.     Acute diastolic CHF  -admit to telemetry  -trop negative x 1, trop x 2 pending  -start lasix 20 mg iv x 1 dose  -strict i/os, daily weights  -supplemental O2  -repeat EKG in am  -cardio consult pending    NAVIN on CKD Stage 4  -avoid nephrotoxic meds  -will give lasix to improve SOB however will monitor Cr for further decline  -nephro consult pending    Hyperkalemia      Hypothyroidism  -c/w Synthroid 25 mcg po qdaily    Hypertension  -c/w Hydralazine 25 mg po bid    Elevated lipase  -lipase level of 105, decreased from 500s in April   -asymptomatic   -continue to monitor    Macrocytic anemia  -iron studies, b12, folate pending 89 yo female with pmhx of Chronic diastolic CHF, moderate AS, hypertension, hypothyroid, breast cancer s/p lumpectomy and radiation in 2007, gallstone pancreatitis in April 2019 presents to ED with complaint of chest pressure, SOB and elevated proBNP concerning for acute CHF exacerbation.     Acute diastolic CHF  -admit to telemetry  -trop negative x 1, trop x 2 pending  -start lasix 20 mg iv x 1 dose  -strict i/os, daily weights  -supplemental O2  -repeat EKG in am  -cardio consult pending    NAVIN on CKD Stage 4  -avoid nephrotoxic meds  -will give lasix to improve SOB however will monitor Cr for further decline  -nephro consult pending    Hyperkalemia  -albuterol 0.083%, 10 mg x 1 dose  -kayexalate 15 g x 1 dose  -repeat bmp in am    Hypothyroidism  -c/w Synthroid 25 mcg po qdaily    Hypertension  -c/w Hydralazine 25 mg po bid    Elevated lipase  -lipase level of 105, decreased from 500s in April 2019  -asymptomatic   -continue to monitor    Macrocytic anemia  -iron studies, b12, folate pending 89 yo female with pmhx of Chronic diastolic CHF, moderate AS, hypertension, hypothyroid, breast cancer s/p lumpectomy and radiation in 2007, gallstone pancreatitis in April 2019 presents to ED with complaint of chest pressure, SOB and elevated proBNP concerning for acute CHF exacerbation.     Acute diastolic CHF  -admit to telemetry  -trop negative x 1, trop x 2 pending  -start lasix 20 mg iv x 1 dose  -strict i/os, daily weights  -supplemental O2  -repeat EKG in am  -cardio consult pending    NAVIN on CKD Stage 4  -avoid nephrotoxic meds  -will give lasix to improve SOB however will monitor Cr for further decline  -nephro consult pending    Hyperkalemia  -albuterol 0.083%, 10 mg x 1 dose  -kayexalate 15 g x 1 dose  -repeat bmp in am    Hypothyroidism  -c/w Synthroid 25 mcg po qdaily    Hypertension  -c/w Hydralazine 25 mg po bid    Elevated lipase  -lipase level of 105, decreased from 500s in April 2019  -asymptomatic   -continue to monitor    Macrocytic anemia  -iron studies, b12, folate pending    Preventive measure  -dvt ppx: Unfractioned heparin 5000U sc q8h

## 2019-06-02 ENCOUNTER — TRANSCRIPTION ENCOUNTER (OUTPATIENT)
Age: 84
End: 2019-06-02

## 2019-06-02 LAB
ANION GAP SERPL CALC-SCNC: 15 MMOL/L — SIGNIFICANT CHANGE UP (ref 5–17)
BASOPHILS # BLD AUTO: 0 K/UL — SIGNIFICANT CHANGE UP (ref 0–0.2)
BASOPHILS NFR BLD AUTO: 0.4 % — SIGNIFICANT CHANGE UP (ref 0–2)
BUN SERPL-MCNC: 32 MG/DL — HIGH (ref 8–20)
CALCIUM SERPL-MCNC: 9.6 MG/DL — SIGNIFICANT CHANGE UP (ref 8.6–10.2)
CHLORIDE SERPL-SCNC: 106 MMOL/L — SIGNIFICANT CHANGE UP (ref 98–107)
CK SERPL-CCNC: 49 U/L — SIGNIFICANT CHANGE UP (ref 25–170)
CK SERPL-CCNC: 51 U/L — SIGNIFICANT CHANGE UP (ref 25–170)
CO2 SERPL-SCNC: 21 MMOL/L — LOW (ref 22–29)
CREAT SERPL-MCNC: 2 MG/DL — HIGH (ref 0.5–1.3)
EOSINOPHIL # BLD AUTO: 0 K/UL — SIGNIFICANT CHANGE UP (ref 0–0.5)
EOSINOPHIL NFR BLD AUTO: 0.6 % — SIGNIFICANT CHANGE UP (ref 0–6)
FERRITIN SERPL-MCNC: 120 NG/ML — SIGNIFICANT CHANGE UP (ref 15–150)
FOLATE SERPL-MCNC: 19.6 NG/ML — SIGNIFICANT CHANGE UP
GLUCOSE BLDC GLUCOMTR-MCNC: 163 MG/DL — HIGH (ref 70–99)
GLUCOSE BLDC GLUCOMTR-MCNC: 71 MG/DL — SIGNIFICANT CHANGE UP (ref 70–99)
GLUCOSE SERPL-MCNC: 71 MG/DL — SIGNIFICANT CHANGE UP (ref 70–115)
HCT VFR BLD CALC: 30.7 % — LOW (ref 37–47)
HGB BLD-MCNC: 9.8 G/DL — LOW (ref 12–16)
IRON SATN MFR SERPL: 30 % — SIGNIFICANT CHANGE UP (ref 14–50)
IRON SATN MFR SERPL: 96 UG/DL — SIGNIFICANT CHANGE UP (ref 37–145)
LACTATE BLDV-MCNC: 1.6 MMOL/L — SIGNIFICANT CHANGE UP (ref 0.5–2)
LYMPHOCYTES # BLD AUTO: 1.6 K/UL — SIGNIFICANT CHANGE UP (ref 1–4.8)
LYMPHOCYTES # BLD AUTO: 30.7 % — SIGNIFICANT CHANGE UP (ref 20–55)
MAGNESIUM SERPL-MCNC: 2.9 MG/DL — HIGH (ref 1.6–2.6)
MCHC RBC-ENTMCNC: 31.9 G/DL — LOW (ref 32–36)
MCHC RBC-ENTMCNC: 33 PG — HIGH (ref 27–31)
MCV RBC AUTO: 103.4 FL — HIGH (ref 81–99)
MONOCYTES # BLD AUTO: 0.9 K/UL — HIGH (ref 0–0.8)
MONOCYTES NFR BLD AUTO: 16.8 % — HIGH (ref 3–10)
NEUTROPHILS # BLD AUTO: 2.7 K/UL — SIGNIFICANT CHANGE UP (ref 1.8–8)
NEUTROPHILS NFR BLD AUTO: 51.3 % — SIGNIFICANT CHANGE UP (ref 37–73)
PHOSPHATE SERPL-MCNC: 3.4 MG/DL — SIGNIFICANT CHANGE UP (ref 2.4–4.7)
PLATELET # BLD AUTO: 180 K/UL — SIGNIFICANT CHANGE UP (ref 150–400)
POTASSIUM SERPL-MCNC: 4.3 MMOL/L — SIGNIFICANT CHANGE UP (ref 3.5–5.3)
POTASSIUM SERPL-SCNC: 4.3 MMOL/L — SIGNIFICANT CHANGE UP (ref 3.5–5.3)
PROCALCITONIN SERPL-MCNC: 0.15 NG/ML — HIGH (ref 0.02–0.1)
RBC # BLD: 2.97 M/UL — LOW (ref 4.4–5.2)
RBC # FLD: 15.4 % — SIGNIFICANT CHANGE UP (ref 11–15.6)
SODIUM SERPL-SCNC: 142 MMOL/L — SIGNIFICANT CHANGE UP (ref 135–145)
TIBC SERPL-MCNC: 322 UG/DL — SIGNIFICANT CHANGE UP (ref 220–430)
TRANSFERRIN SERPL-MCNC: 225 MG/DL — SIGNIFICANT CHANGE UP (ref 192–382)
TROPONIN T SERPL-MCNC: 0.03 NG/ML — SIGNIFICANT CHANGE UP (ref 0–0.06)
TROPONIN T SERPL-MCNC: 0.04 NG/ML — SIGNIFICANT CHANGE UP (ref 0–0.06)
VIT B12 SERPL-MCNC: 820 PG/ML — SIGNIFICANT CHANGE UP (ref 232–1245)
WBC # BLD: 5.2 K/UL — SIGNIFICANT CHANGE UP (ref 4.8–10.8)
WBC # FLD AUTO: 5.2 K/UL — SIGNIFICANT CHANGE UP (ref 4.8–10.8)

## 2019-06-02 PROCEDURE — 73502 X-RAY EXAM HIP UNI 2-3 VIEWS: CPT | Mod: 26,LT

## 2019-06-02 PROCEDURE — 99233 SBSQ HOSP IP/OBS HIGH 50: CPT | Mod: GC

## 2019-06-02 PROCEDURE — 99221 1ST HOSP IP/OBS SF/LOW 40: CPT | Mod: 57

## 2019-06-02 PROCEDURE — 93970 EXTREMITY STUDY: CPT | Mod: 26

## 2019-06-02 PROCEDURE — 74176 CT ABD & PELVIS W/O CONTRAST: CPT | Mod: 26

## 2019-06-02 PROCEDURE — 27230 TREAT THIGH FRACTURE: CPT | Mod: LT

## 2019-06-02 PROCEDURE — 93010 ELECTROCARDIOGRAM REPORT: CPT

## 2019-06-02 RX ORDER — ACETAMINOPHEN 500 MG
650 TABLET ORAL EVERY 6 HOURS
Refills: 0 | Status: DISCONTINUED | OUTPATIENT
Start: 2019-06-02 | End: 2019-06-02

## 2019-06-02 RX ORDER — SODIUM CHLORIDE 9 MG/ML
1000 INJECTION, SOLUTION INTRAVENOUS
Refills: 0 | Status: DISCONTINUED | OUTPATIENT
Start: 2019-06-02 | End: 2019-06-07

## 2019-06-02 RX ORDER — HYDRALAZINE HCL 50 MG
25 TABLET ORAL
Refills: 0 | Status: DISCONTINUED | OUTPATIENT
Start: 2019-06-02 | End: 2019-06-02

## 2019-06-02 RX ORDER — ERGOCALCIFEROL 1.25 MG/1
1 CAPSULE ORAL
Qty: 0 | Refills: 0 | DISCHARGE

## 2019-06-02 RX ORDER — FUROSEMIDE 40 MG
20 TABLET ORAL ONCE
Refills: 0 | Status: DISCONTINUED | OUTPATIENT
Start: 2019-06-02 | End: 2019-06-02

## 2019-06-02 RX ORDER — CIPROFLOXACIN LACTATE 400MG/40ML
VIAL (ML) INTRAVENOUS
Refills: 0 | Status: DISCONTINUED | OUTPATIENT
Start: 2019-06-02 | End: 2019-06-05

## 2019-06-02 RX ORDER — HYDRALAZINE HCL 50 MG
25 TABLET ORAL
Refills: 0 | Status: DISCONTINUED | OUTPATIENT
Start: 2019-06-02 | End: 2019-06-07

## 2019-06-02 RX ORDER — SODIUM POLYSTYRENE SULFONATE 4.1 MEQ/G
15 POWDER, FOR SUSPENSION ORAL ONCE
Refills: 0 | Status: COMPLETED | OUTPATIENT
Start: 2019-06-02 | End: 2019-06-02

## 2019-06-02 RX ORDER — METRONIDAZOLE 500 MG
500 TABLET ORAL EVERY 12 HOURS
Refills: 0 | Status: DISCONTINUED | OUTPATIENT
Start: 2019-06-02 | End: 2019-06-07

## 2019-06-02 RX ORDER — ASPIRIN/CALCIUM CARB/MAGNESIUM 324 MG
81 TABLET ORAL DAILY
Refills: 0 | Status: DISCONTINUED | OUTPATIENT
Start: 2019-06-03 | End: 2019-06-07

## 2019-06-02 RX ORDER — DEXTROSE 50 % IN WATER 50 %
25 SYRINGE (ML) INTRAVENOUS ONCE
Refills: 0 | Status: DISCONTINUED | OUTPATIENT
Start: 2019-06-02 | End: 2019-06-07

## 2019-06-02 RX ORDER — ACETAMINOPHEN 500 MG
325 TABLET ORAL EVERY 4 HOURS
Refills: 0 | Status: DISCONTINUED | OUTPATIENT
Start: 2019-06-02 | End: 2019-06-07

## 2019-06-02 RX ORDER — ONDANSETRON 8 MG/1
1 TABLET, FILM COATED ORAL
Qty: 0 | Refills: 0 | DISCHARGE

## 2019-06-02 RX ORDER — CIPROFLOXACIN LACTATE 400MG/40ML
400 VIAL (ML) INTRAVENOUS ONCE
Refills: 0 | Status: COMPLETED | OUTPATIENT
Start: 2019-06-02 | End: 2019-06-02

## 2019-06-02 RX ORDER — FUROSEMIDE 40 MG
20 TABLET ORAL DAILY
Refills: 0 | Status: DISCONTINUED | OUTPATIENT
Start: 2019-06-02 | End: 2019-06-03

## 2019-06-02 RX ORDER — ALBUTEROL 90 UG/1
10 AEROSOL, METERED ORAL ONCE
Refills: 0 | Status: COMPLETED | OUTPATIENT
Start: 2019-06-02 | End: 2019-06-02

## 2019-06-02 RX ORDER — ALPRAZOLAM 0.25 MG
0.25 TABLET ORAL ONCE
Refills: 0 | Status: DISCONTINUED | OUTPATIENT
Start: 2019-06-02 | End: 2019-06-02

## 2019-06-02 RX ORDER — GLUCAGON INJECTION, SOLUTION 0.5 MG/.1ML
1 INJECTION, SOLUTION SUBCUTANEOUS ONCE
Refills: 0 | Status: DISCONTINUED | OUTPATIENT
Start: 2019-06-02 | End: 2019-06-07

## 2019-06-02 RX ORDER — HYDRALAZINE HCL 50 MG
5 TABLET ORAL EVERY 4 HOURS
Refills: 0 | Status: DISCONTINUED | OUTPATIENT
Start: 2019-06-02 | End: 2019-06-07

## 2019-06-02 RX ORDER — HEPARIN SODIUM 5000 [USP'U]/ML
5000 INJECTION INTRAVENOUS; SUBCUTANEOUS EVERY 8 HOURS
Refills: 0 | Status: DISCONTINUED | OUTPATIENT
Start: 2019-06-02 | End: 2019-06-07

## 2019-06-02 RX ORDER — ENOXAPARIN SODIUM 100 MG/ML
40 INJECTION SUBCUTANEOUS DAILY
Refills: 0 | Status: DISCONTINUED | OUTPATIENT
Start: 2019-06-02 | End: 2019-06-02

## 2019-06-02 RX ORDER — DEXTROSE 50 % IN WATER 50 %
50 SYRINGE (ML) INTRAVENOUS ONCE
Refills: 0 | Status: COMPLETED | OUTPATIENT
Start: 2019-06-02 | End: 2019-06-02

## 2019-06-02 RX ORDER — LEVOTHYROXINE SODIUM 125 MCG
25 TABLET ORAL DAILY
Refills: 0 | Status: DISCONTINUED | OUTPATIENT
Start: 2019-06-02 | End: 2019-06-07

## 2019-06-02 RX ORDER — DEXTROSE 50 % IN WATER 50 %
12.5 SYRINGE (ML) INTRAVENOUS ONCE
Refills: 0 | Status: DISCONTINUED | OUTPATIENT
Start: 2019-06-02 | End: 2019-06-07

## 2019-06-02 RX ORDER — DEXTROSE 50 % IN WATER 50 %
15 SYRINGE (ML) INTRAVENOUS ONCE
Refills: 0 | Status: DISCONTINUED | OUTPATIENT
Start: 2019-06-02 | End: 2019-06-07

## 2019-06-02 RX ORDER — CIPROFLOXACIN LACTATE 400MG/40ML
400 VIAL (ML) INTRAVENOUS EVERY 24 HOURS
Refills: 0 | Status: DISCONTINUED | OUTPATIENT
Start: 2019-06-03 | End: 2019-06-05

## 2019-06-02 RX ADMIN — Medication 20 MILLIGRAM(S): at 09:11

## 2019-06-02 RX ADMIN — ALBUTEROL 5 MILLIGRAM(S): 90 AEROSOL, METERED ORAL at 03:00

## 2019-06-02 RX ADMIN — Medication 200 MILLIGRAM(S): at 05:43

## 2019-06-02 RX ADMIN — Medication 25 MILLIGRAM(S): at 17:39

## 2019-06-02 RX ADMIN — SODIUM POLYSTYRENE SULFONATE 15 GRAM(S): 4.1 POWDER, FOR SUSPENSION ORAL at 03:41

## 2019-06-02 RX ADMIN — Medication 325 MILLIGRAM(S): at 02:45

## 2019-06-02 RX ADMIN — Medication 325 MILLIGRAM(S): at 01:58

## 2019-06-02 RX ADMIN — Medication 25 MICROGRAM(S): at 05:45

## 2019-06-02 RX ADMIN — HEPARIN SODIUM 5000 UNIT(S): 5000 INJECTION INTRAVENOUS; SUBCUTANEOUS at 22:07

## 2019-06-02 RX ADMIN — Medication 100 MILLIGRAM(S): at 06:03

## 2019-06-02 RX ADMIN — Medication 50 MILLILITER(S): at 09:11

## 2019-06-02 RX ADMIN — Medication 100 MILLIGRAM(S): at 17:39

## 2019-06-02 RX ADMIN — HEPARIN SODIUM 5000 UNIT(S): 5000 INJECTION INTRAVENOUS; SUBCUTANEOUS at 15:38

## 2019-06-02 RX ADMIN — Medication 25 MILLIGRAM(S): at 01:54

## 2019-06-02 NOTE — SWALLOW BEDSIDE ASSESSMENT ADULT - PHARYNGEAL PHASE
Decreased laryngeal elevation/Within functional limits Delayed cough post oral intake Within functional limits

## 2019-06-02 NOTE — DISCHARGE NOTE PROVIDER - HOSPITAL COURSE
89 yo female with pmhx of Chronic diastolic CHF, moderate AS, hypertension, hypothyroid, breast cancer s/p lumpectomy and radiation in 2007, gallstone pancreatitis in April 2019 presents to ED with complaint of chest pressure, SOB and elevated proBNP concerning for acute CHF exacerbation, incidentally found to have a Incomplete left femoral neck fracture, unclear if subacute/chronic. MRI ordered for further evaluation. management per ortho. 89 yo female with hx of Chronic diastolic CHFEF70%, mild AS, compression fx T11-T12, hypertension, hypothyroid, breast cancer s/p lumpectomy and radiation in 2007, gallstone pancreatitis in April 2019 who currently presented to the ED with complaints of chest pressure and SOB. Noted with elevated proBNP as well as mod b/l pleural effusions on imaging and admitted with acute on chronic diastolic HF exacerbation. Patient initially receiving IV diuresis switched to po yesterday, known ckd 4 and monitoring creatine on diuresis. Also incidentally found to have acute sigmoid diverticulitis, likely chest and abd pain is also from this, elevated procalcitonin and initial hypothermia, empirically placed on cipro and flagyl with improvement in hypothermia and sx. Hospital course further complicated by noted left femoral neck fracture, for which initially ortho consulted and recommended MRI but the patient is refusing. Given refual and likely injury in January, will be treated as chronic left femoral neck fx and pt will be WBAT. PT recommended home with rolling walker. Pt accepted to hospice daughter to sign final consents and equipment to be delivered. 89 yo female with hx of Chronic diastolic CHFEF70%, mild AS, compression fx T11-T12, hypertension, hypothyroid, breast cancer s/p lumpectomy and radiation in 2007, gallstone pancreatitis in April 2019 who currently presented to the ED with complaints of chest pressure and SOB. Noted with elevated proBNP as well as mod b/l pleural effusions on imaging and admitted with acute on chronic diastolic HF exacerbation. Patient initially receiving IV diuresis switched to po yesterday, known ckd 4 and monitoring creatine on diuresis. Also incidentally found to have acute sigmoid diverticulitis, likely chest and abd pain is also from this, elevated procalcitonin and initial hypothermia, empirically placed on cipro and flagyl with improvement in hypothermia and sx. Hospital course further complicated by noted left femoral neck fracture, for which initially ortho consulted and recommended MRI but the patient is refusing. Given refual and likely injury in January, will be treated as chronic left femoral neck fx and pt will be WBAT. PT recommended home with rolling walker. Pt accepted to hospice daughter to sign final consents and equipment to be delivered. In the meantime, patient is stable and medically optimized and will be discharged to home with home care. As per discussion with hospice team, patient can be enrolled in home hospice when hcp signs the consent forms. Patient to complete 4 more days of po cipro and flagyl for acute diverticulitis.         Vital Signs Last 24 Hrs    T(C): 36.1 (07 Jun 2019 09:42), Max: 36.6 (06 Jun 2019 22:21)    T(F): 97 (07 Jun 2019 09:42), Max: 97.8 (06 Jun 2019 22:21)    HR: 65 (07 Jun 2019 09:42) (65 - 78)    BP: 127/50 (07 Jun 2019 09:42) (116/55 - 163/53)    RR: 16 (07 Jun 2019 09:42) (16 - 16)    SpO2: 96% (07 Jun 2019 09:42) (93% - 96%)        Physical Exam:     Gen: WD WN NAD    HEENT: NCAT, EOMI, PERRLA    CVS: RRR, +S1/S2, +holosystolic murmur, no rubs or gallops appreciated, mild chest wall tenderness noted across upper chest    Lungs: CTAB, no wheeze, rales, rhonchi    Abdomen: +BS, soft, ND, NT. No palpable flank tenderness or mass, no CVA tenderness    Ext: No cyanosis, edema, no b/l hip or calf tenderness    Neuro: AAOx3, no focal deficits.            Time spent on discharge: 33 mins 89 yo female with hx of Chronic diastolic CHFEF70%, mild AS, compression fx T11-T12, hypertension, hypothyroid, breast cancer s/p lumpectomy and radiation in 2007, gallstone pancreatitis in April 2019 who currently presented to the ED with complaints of chest pressure and SOB. Noted with elevated proBNP as well as mod b/l pleural effusions on imaging and admitted with acute on chronic diastolic HF exacerbation. Patient initially receiving IV diuresis switched to po yesterday, known ckd 4 and monitoring creatine on diuresis. Also incidentally found to have acute sigmoid diverticulitis, likely chest and abd pain is also from this, elevated procalcitonin and initial hypothermia, empirically placed on cipro and flagyl with improvement in hypothermia and sx. Hospital course further complicated by noted left femoral neck fracture, for which initially ortho consulted and recommended MRI but the patient is refusing. Given refual and likely injury in January, will be treated as chronic left femoral neck fx and pt will be WBAT. PT recommended home with rolling walker. Pt accepted to hospice daughter to sign final consents and equipment to be delivered. However as per discussion with daughter today, she now prefers [facility] and declined hospice services. Patient is stable and medically optimized and will be discharged to [facility] ([facility]). Patient to complete 4 more days of po cipro and flagyl for acute diverticulitis.         Vital Signs Last 24 Hrs    T(C): 36.1 (07 Jun 2019 09:42), Max: 36.6 (06 Jun 2019 22:21)    T(F): 97 (07 Jun 2019 09:42), Max: 97.8 (06 Jun 2019 22:21)    HR: 65 (07 Jun 2019 09:42) (65 - 78)    BP: 127/50 (07 Jun 2019 09:42) (116/55 - 163/53)    RR: 16 (07 Jun 2019 09:42) (16 - 16)    SpO2: 96% (07 Jun 2019 09:42) (93% - 96%)        Physical Exam:     Gen: WD WN NAD    HEENT: NCAT, EOMI, PERRLA    CVS: RRR, +S1/S2, +holosystolic murmur, no rubs or gallops appreciated, mild chest wall tenderness noted across upper chest    Lungs: CTAB, no wheeze, rales, rhonchi    Abdomen: +BS, soft, ND, NT. No palpable flank tenderness or mass, no CVA tenderness    Ext: No cyanosis, edema, no b/l hip or calf tenderness    Neuro: AAOx3, no focal deficits.            Time spent on discharge: 33 mins 89 yo female with hx of Chronic diastolic CHFEF70%, mild AS, compression fx T11-T12, hypertension, hypothyroid, breast cancer s/p lumpectomy and radiation in 2007, gallstone pancreatitis in April 2019 who currently presented to the ED with complaints of chest pressure and SOB. Noted with elevated proBNP as well as mod b/l pleural effusions on imaging and admitted with acute on chronic diastolic HF exacerbation. Patient initially receiving IV diuresis switched to po yesterday, known ckd 4 and monitoring creatine on diuresis. Also incidentally found to have acute sigmoid diverticulitis, likely chest and abd pain is also from this, elevated procalcitonin and initial hypothermia, empirically placed on cipro and flagyl with improvement in hypothermia and sx. Hospital course further complicated by noted left femoral neck fracture, for which initially ortho consulted and recommended MRI but the patient is refusing. Given refual and likely injury in January, will be treated as chronic left femoral neck fx and pt will be WBAT. PT recommended home with rolling walker. Pt accepted to hospice daughter to sign final consents and equipment to be delivered. However as per discussion with daughter today, she now prefers [facility] and declined hospice services.         Patient is stable and medically optimized and will be discharged to [facility] ([facility]). Patient to complete 4 more days of po cipro and flagyl for acute diverticulitis. Follow up with PMD and Cardio within 1 week of discharge. Follow up with Nephro in 2 weeks, and with Ortho within 2 weeks for repeat xray of left hip.        Vital Signs Last 24 Hrs    T(C): 36.1 (07 Jun 2019 09:42), Max: 36.6 (06 Jun 2019 22:21)    T(F): 97 (07 Jun 2019 09:42), Max: 97.8 (06 Jun 2019 22:21)    HR: 65 (07 Jun 2019 09:42) (65 - 78)    BP: 127/50 (07 Jun 2019 09:42) (116/55 - 163/53)    RR: 16 (07 Jun 2019 09:42) (16 - 16)    SpO2: 96% (07 Jun 2019 09:42) (93% - 96%)        Physical Exam:     Gen: WD WN NAD    HEENT: NCAT, EOMI, PERRLA    CVS: RRR, +S1/S2, +holosystolic murmur, no rubs or gallops appreciated, mild chest wall tenderness noted across upper chest    Lungs: CTAB, no wheeze, rales, rhonchi    Abdomen: +BS, soft, ND, NT. No palpable flank tenderness or mass, no CVA tenderness    Ext: No cyanosis, edema, no b/l hip or calf tenderness    Neuro: AAOx3, no focal deficits.            Time spent on discharge: 33 mins 89 yo female with hx of Chronic diastolic CHFEF70%, mild AS, compression fx T11-T12, hypertension, hypothyroid, breast cancer s/p lumpectomy and radiation in 2007, gallstone pancreatitis in April 2019 who currently presented to the ED with complaints of chest pressure and SOB. Noted with elevated proBNP as well as mod b/l pleural effusions on imaging and admitted with acute on chronic diastolic HF exacerbation. Patient initially receiving IV diuresis switched to po yesterday, known ckd 4 and monitoring creatine on diuresis. Also incidentally found to have acute sigmoid diverticulitis, likely chest and abd pain is also from this, elevated procalcitonin and initial hypothermia, empirically placed on cipro and flagyl with improvement in hypothermia and sx to finish 4 more days of abx. Hospital course further complicated by noted left femoral neck fracture, for which initially ortho consulted and recommended MRI but the patient is refusing. Given refual and likely injury in January, will be treated as chronic left femoral neck fx and pt will be WBAT. PT recommended home with rolling walker but given her debility and extended hospitalization. Daughter disagreed with hospice and wanted pt to go to [facility].         Patient is stable and medically optimized and will be discharged to [facility] ([facility]). Patient to complete 4 more days of po cipro and flagyl for acute diverticulitis. Follow up with PMD and Cardio within 1 week of discharge. Follow up with Nephro in 2 weeks, and with Ortho within 2 weeks for repeat xray of left hip.        Vital Signs Last 24 Hrs    T(C): 36.3 (07 Jun 2019 16:04), Max: 36.6 (06 Jun 2019 22:21)    T(F): 97.4 (07 Jun 2019 16:04), Max: 97.8 (06 Jun 2019 22:21)    HR: 64 (07 Jun 2019 16:04) (64 - 78)    BP: 139/50 (07 Jun 2019 16:04) (116/55 - 163/53)    BP(mean): --    RR: 18 (07 Jun 2019 16:04) (16 - 18)    SpO2: 96% (07 Jun 2019 09:42) (93% - 96%)        Physical Exam:     Gen: WD thin NAD    HEENT: NCAT, EOMI, PERRLA    CVS: RRR, +S1/S2, +holosystolic murmur, no rubs or gallops appreciated, mild chest wall tenderness noted across upper chest    Lungs: CTAB, no wheeze, rales, rhonchi    Abdomen: +BS, soft, ND, NT. No palpable flank tenderness or mass, no CVA tenderness    Ext: No cyanosis, edema, no b/l hip or calf tenderness    Neuro: AAOx3, no focal deficits.            Time spent on discharge: 33 mins

## 2019-06-02 NOTE — SWALLOW BEDSIDE ASSESSMENT ADULT - SLP GENERAL OBSERVATIONS
Pt received resting, reclined on stretcher in ED, easily arousable, pain level 0/10 via verbal pain scale

## 2019-06-02 NOTE — DISCHARGE NOTE PROVIDER - CARE PROVIDERS DIRECT ADDRESSES
,DirectAddress_Unknown,DirectAddress_Unknown ,DirectAddress_Unknown,DirectAddress_Unknown,[email or web address] ,DirectAddress_Unknown,DirectAddress_Unknown,DirectAddress_Unknown,[email or web address]

## 2019-06-02 NOTE — PROGRESS NOTE ADULT - ASSESSMENT
89 yo female with pmhx of Chronic diastolic CHF, moderate AS, hypertension, hypothyroid, breast cancer s/p lumpectomy and radiation in 2007, gallstone pancreatitis in April 2019 presents to ED with complaint of chest pressure, SOB and elevated proBNP concerning for acute CHF exacerbation.     Acute diastolic CHF  -admit to telemetry  -trop negative x 1, trop x 2 pending  -start lasix 20 mg iv x 1 dose  -strict i/os, daily weights  -supplemental O2  -repeat EKG in am  -cardio consult pending    Acute nondisplaced subcapital left femoral neck fracture  -found on CT abd/pelvis, pt denies hx of fall  -X-ray abd pelvis pending  -ortho consult pending  -xray pelvis ap, xray hip 3 views pending    Acute diverticulitis  -start ciprofloxacin 400 mg iv q24h  -start metronidazole 500 mg iv q24h    NAVIN on CKD Stage 4  -avoid nephrotoxic meds  -will give lasix to improve SOB however will monitor Cr for further decline  -nephro consult pending    Hyperkalemia  -albuterol 0.083%, 10 mg x 1 dose  -kayexalate 15 g x 1 dose  -repeat bmp in am    Hypothyroidism  -c/w Synthroid 25 mcg po qdaily    Hypertension  -c/w Hydralazine 25 mg po bid    Elevated lipase  -lipase level of 105, decreased from 500s in April 2019  -asymptomatic   -continue to monitor    Macrocytic anemia  -iron studies, b12, folate pending 89 yo female with hx of Chronic diastolic CHFEF70%, mild AS, hypertension, hypothyroid, breast cancer s/p lumpectomy and radiation in 2007, gallstone pancreatitis in April 2019 who currently presented to the ED with complaint of chest pressure and SOB  noted with elevated proBNP as well as mod b/l pleural effusions on imaging and admitted with acute on chronic diastolic HF exacerbation. Currently receiving diuresis and cardio continues to follow the pt. Also incidentally found to have acute sigmoid diverticulitis, likely chest and abd pain is also from this, elevated procalcitonin and initial hypothermia, empirically placed on cipro and flagyl with improvement in hypothermia. Hospital course further complicated by noted left femoral neck fracture, for which ortho consulted and recommended MRI but the pt is refusing. Slow clinical improvement.     Acute on chronic diastolic CHF  -Pt with improving sob - elevated probnp   -cardiac enzymes negative   -c/w lasix 20mg IV QD   -strict i/os, daily weights   - fluid restriction   -c/w supplemental O2 as needed   - Prior recent April 2019 2D echo noted. Only mild AS on report. EF 70%.  -cardio consult noted and appreciated    Acute nondisplaced subcapital left femoral neck fracture  -pt denies hx of fall/ trauma  - states she has been ambulating with her walker without complications  - found on CT abd/pelvis    -X-rays completed and also showed left femoral neck fracture   -ortho consult noted and appreciated, recommended MRI of the left hip for further info but patient is refusing the test due to calustrophobia and does not want any type of sedation or medication to calm her down for the test to be obtained.     Acute diverticulitis  - pt states she is having mild chest and abd pain, she cannot distinguish the two   -c/w ciprofloxacin 400 mg iv q24h D#1  -c/w metronidazole 500 mg iv q24h D#1  - c/w PPI   - Note on ct imaging   - pt will need repeat imaging in 4-6 weeks and colonoscopy as an outpatient     NAVIN on CKD Stage 4  - Creatine ~1.8-2; prior from April was ~ 1.8-2   - hyperkalemia resolved   -avoid nephrotoxic meds  -will monitor renal fxn closely on diuresis   -nephro consult noted and appreciated     Hypertension  - bp stable   -c/w Hydralazine 25 mg po bid    Macrocytic anemia  -Anemia panel noted  - hgb stable ~9     Hypothyroidism  -c/w Synthroid 25 mcg po qdaily    DVT ppx;  -c/w heparin sq     Advanced Directives: DNR/DNI  - prior MOLST completed  Next of kin: Daughter Morena    Dispo: Given possible hip fx, will need PT, at baseline pt is ambulatory with walker. Anticipated discharge in 4-5 days. 89 yo female with hx of Chronic diastolic CHFEF70%, mild AS, compression fx [id number], hypothyroid, breast cancer s/p lumpectomy and radiation in 2007, gallstone pancreatitis in April 2019 who currently presented to the ED with complaint of chest pressure and SOB  noted with elevated proBNP as well as mod b/l pleural effusions on imaging and admitted with acute on chronic diastolic HF exacerbation. Currently receiving diuresis and cardio continues to follow the pt. Also incidentally found to have acute sigmoid diverticulitis, likely chest and abd pain is also from this, elevated procalcitonin and initial hypothermia, empirically placed on cipro and flagyl with improvement in hypothermia. Hospital course further complicated by noted left femoral neck fracture, for which ortho consulted and recommended MRI but the pt is refusing. Slow clinical improvement.     Acute on chronic diastolic CHF  -Pt with improving sob - elevated probnp   -cardiac enzymes negative   -c/w lasix 20mg IV QD   -strict i/os, daily weights   - fluid restriction   -c/w supplemental O2 as needed   - Prior recent April 2019 2D echo noted. Only mild AS on report. EF 70%.  -cardio consult noted and appreciated    Acute nondisplaced subcapital left femoral neck fracture  -pt denies hx of fall/ trauma  - states she has been ambulating with her walker without complications  - found on CT abd/pelvis    -X-rays completed and also showed left femoral neck fracture   -ortho consult noted and appreciated, recommended MRI of the left hip for further info but patient is refusing the test due to calustrophobia and does not want any type of sedation or medication to calm her down for the test to be obtained.     Acute diverticulitis  - pt states she is having mild chest and abd pain, she cannot distinguish the two   -c/w ciprofloxacin 400 mg iv q24h D#1  -c/w metronidazole 500 mg iv q24h D#1  - c/w PPI   - Note on ct imaging   - pt will need repeat imaging in 4-6 weeks and colonoscopy as an outpatient     NAVIN on CKD Stage 4  - Creatine ~1.8-2; prior from April was ~ 1.8-2   - hyperkalemia resolved   -avoid nephrotoxic meds  -will monitor renal fxn closely on diuresis   -nephro consult noted and appreciated     Hypertension  - bp stable   -c/w Hydralazine 25 mg po bid    Macrocytic anemia  -Anemia panel noted  - hgb stable ~9     Hypothyroidism  -c/w Synthroid 25 mcg po qdaily    DVT ppx;  -c/w heparin sq     Advanced Directives: DNR/DNI  - prior MOLST completed  Next of kin: Daughter Morena    Dispo: Given possible hip fx, will need PT, at baseline pt is ambulatory with walker. Anticipated discharge in 4-5 days.

## 2019-06-02 NOTE — PROGRESS NOTE ADULT - SUBJECTIVE AND OBJECTIVE BOX
CC: Patient is a 89y old  Female who presents with a chief complaint of acute CHF exacerbation (01 Jun 2019 23:50)    Patient seen and examined at bedside, She continues to report chest pressure, diffusely across upper chest, tender to palpation. Pain is improved with Tylenol. She also reports improvement to SOB. Speaking in full sentences, normal work of breathing.   Patient not ambulating, voiding and last BM prior to admission.   Cardiac monitor reviewed;    ROS: Denies fever, abdominal pain, diarrhea, constipation, calf pain.    VS:   Vital Signs Last 24 Hrs  T(C): 36.7 (02 Jun 2019 01:02), Max: 36.7 (02 Jun 2019 01:02)  T(F): 98 (02 Jun 2019 01:02), Max: 98 (02 Jun 2019 01:02)  HR: 88 (02 Jun 2019 03:39) (84 - 94)  BP: 116/62 (02 Jun 2019 03:39) (116/62 - 196/82)  RR: 16 (02 Jun 2019 01:02) (16 - 18)  SpO2: 94% (02 Jun 2019 03:01) (93% - 95%)    Physical Exam:   Gen: NAD  HEENT: NCAT, EOMI, PERRLA  CVS: RRR, +S1/S2, no murmurs, rubs or gallops appreciated, chest wall tenderness noted across upper chest  Lungs: CTAB, no wheeze, rales, rhonchi  Abdomen: +BS, soft, ND, NT. no palpable flank tenderness or mass, no CVA tenderness  Ext: No cyanosis, edema or calf tenderness  Neuro: AAOx3, no focal deficits.    Labs:                        10.6   5.1   )-----------( 215      ( 01 Jun 2019 20:09 )             32.4   06-02    142  |  106  |  32.0<H>  ----------------------------<  71  4.3   |  21.0<L>  |  2.00<H>    Ca    9.6      02 Jun 2019 04:16  Phos  3.4     06-02  Mg     2.9     06-02    TPro  7.5  /  Alb  3.8  /  TBili  0.5  /  DBili  x   /  AST  26  /  ALT  22  /  AlkPhos  133<H>  06-01        Radiology:  < from: CT Abdomen and Pelvis No Cont (06.02.19 @ 03:24) >  IMPRESSION:   1. Mild acute diverticulitis of the proximal sigmoid colon.   2. Moderate bilateral pleural effusions with associated compressive   atelectasis.   3. Acute appearing nondisplaced subcapital left femoral neck fracture.          ******PRELIMINARY REPORT******      < end of copied text >      Medications:  MEDICATIONS  (STANDING):  enoxaparin Injectable 40 milliGRAM(s) SubCutaneous daily  hydrALAZINE 25 milliGRAM(s) Oral two times a day  levothyroxine 25 MICROGram(s) Oral daily    MEDICATIONS  (PRN):  acetaminophen   Tablet .. 325 milliGRAM(s) Oral every 4 hours PRN Mild Pain (1 - 3), Moderate Pain (4 - 6) CC: Patient is a 89y old  Female who presents with a chief complaint of acute CHF exacerbation (01 Jun 2019 23:50) improving sob     Overnight/ AM events:   Patient seen and examined at bedside, She continues to report chest pressure, diffusely across upper chest, tender to palpation. Pain is improved with Tylenol. She also reports improvement to SOB. Speaking in full sentences, normal work of breathing.   Patient not ambulating, voiding and last BM prior to admission.   Cardiac monitor reviewed;    ROS: Denies fever, abdominal pain, diarrhea, constipation, calf pain.    VS:   Vital Signs Last 24 Hrs  T(C): 36.7 (02 Jun 2019 01:02), Max: 36.7 (02 Jun 2019 01:02)  T(F): 98 (02 Jun 2019 01:02), Max: 98 (02 Jun 2019 01:02)  HR: 88 (02 Jun 2019 03:39) (84 - 94)  BP: 116/62 (02 Jun 2019 03:39) (116/62 - 196/82)  RR: 16 (02 Jun 2019 01:02) (16 - 18)  SpO2: 94% (02 Jun 2019 03:01) (93% - 95%)    Physical Exam:   Gen: WD WN NAD  HEENT: NCAT, EOMI, PERRLA  CVS: RRR, +S1/S2, no murmurs, rubs or gallops appreciated, chest wall tenderness noted across upper chest  Lungs: CTAB, no wheeze, rales, rhonchi  Abdomen: +BS, soft, ND, NT. no palpable flank tenderness or mass, no CVA tenderness  Ext: No cyanosis, edema or calf tenderness  Neuro: AAOx3, no focal deficits.    Labs:                        10.6   5.1   )-----------( 215      ( 01 Jun 2019 20:09 )             32.4   06-02    142  |  106  |  32.0<H>  ----------------------------<  71  4.3   |  21.0<L>  |  2.00<H>    Ca    9.6      02 Jun 2019 04:16  Phos  3.4     06-02  Mg     2.9     06-02    TPro  7.5  /  Alb  3.8  /  TBili  0.5  /  DBili  x   /  AST  26  /  ALT  22  /  AlkPhos  133<H>  06-01        Radiology:  < from: CT Abdomen and Pelvis No Cont (06.02.19 @ 03:24) >  IMPRESSION:   1. Mild acute diverticulitis of the proximal sigmoid colon.   2. Moderate bilateral pleural effusions with associated compressive   atelectasis.   3. Acute appearing nondisplaced subcapital left femoral neck fracture.          ******PRELIMINARY REPORT******      < end of copied text >      Medications:  MEDICATIONS  (STANDING):  enoxaparin Injectable 40 milliGRAM(s) SubCutaneous daily  hydrALAZINE 25 milliGRAM(s) Oral two times a day  levothyroxine 25 MICROGram(s) Oral daily    MEDICATIONS  (PRN):  acetaminophen   Tablet .. 325 milliGRAM(s) Oral every 4 hours PRN Mild Pain (1 - 3), Moderate Pain (4 - 6)

## 2019-06-02 NOTE — CONSULT NOTE ADULT - ATTENDING COMMENTS
Incomplete left femoral neck fracture, may be subacute/chronic.  Patient denies any hip pain with walking prior to admission, and on exam can do an active SLR with no difficulty and no pain.  No pain at all with hip flexion 100, ER 40, IR 20.  NVID LLE.  Recommend MRI left hip to assess acuity.  Patient does not want an MRI stating she "cannot breath in those machines".  Will discuss with daughter and see if MRI can be done with anesthesia.  It is important to assess fracture acuity for WB recommendations or possible need for surgical fixation.  Remain NWB LLE until MRI completed.

## 2019-06-02 NOTE — DISCHARGE NOTE PROVIDER - NSDCFUADDAPPT_GEN_ALL_CORE_FT
Follow up with your primary care doctor and cardiology within 1 week of discharge. Follow up with your primary care doctor (Dr Banda) and [facility] within 1 week of discharge.   Follow up with Nephrology (Dr Leon) within 2 weeks of discharge.  Follow up with Orthopedic Surgery (Dr Garcia) within 2 weeks for repeat xray of the left hip.

## 2019-06-02 NOTE — DISCHARGE NOTE PROVIDER - PROVIDER TOKENS
FREE:[LAST:[Solo],FIRST:[Ninfa],PHONE:[(   )    -],FAX:[(   )    -],ADDRESS:[[address] [place] [address]  Phone: [phone]]],FREE:[LAST:[[facility]],PHONE:[(   )    -],FAX:[(   )    -]] FREE:[LAST:[Solo],FIRST:[Ninfa],PHONE:[(   )    -],FAX:[(   )    -],ADDRESS:[[address] [place] [address]  Phone: [phone]]],FREE:[LAST:[[facility]],PHONE:[(   )    -],FAX:[(   )    -]],PROVIDER:[TOKEN:[[id number]]] FREE:[LAST:[Solo],FIRST:[Ninfa],PHONE:[(   )    -],FAX:[(   )    -],ADDRESS:[[address] [place] [address]  Phone: [phone]]],FREE:[LAST:[[facility]],PHONE:[(   )    -],FAX:[(   )    -],ADDRESS:[[address] [place] [address]  PHONE: [phone]  FAX: [phone]]] PROVIDER:[TOKEN:[[id number]]],FREE:[LAST:[Solo],FIRST:[Ninfa],PHONE:[(   )    -],FAX:[(   )    -],ADDRESS:[[address] [place] [address]  Phone: [phone]]],FREE:[LAST:[[facility]],PHONE:[(   )    -],FAX:[(   )    -],ADDRESS:[[address] [place] [address]  PHONE: [phone]  FAX: [phone]]],PROVIDER:[TOKEN:[[id number]]]

## 2019-06-02 NOTE — DISCHARGE NOTE PROVIDER - NSDCFUADDINST_GEN_ALL_CORE_FT
Orthopedic Recommendations: Patient may be WBAT with walker. DVT ppx as per primary team.  Follow up as outpatient for repeat x-rays in 2 weeks. Orthopedic Recommendations: Patient may be weight-bear on left lower extremity as tolerated, ambulate with rolling walker.

## 2019-06-02 NOTE — DISCHARGE NOTE PROVIDER - NSDCCPCAREPLAN_GEN_ALL_CORE_FT
PRINCIPAL DISCHARGE DIAGNOSIS  Diagnosis: Acute on chronic diastolic heart failure  Assessment and Plan of Treatment: Continue all medications as prescribed. Be sure to follow up with your Primary Care Doctor and/or Cardiologist. Follow a low salt diet. Check your weight regularly, if you find that you have suddenly gained a large amount of weight, are having trouble breathing, can no longer lay flat on your back, or that you are becoming swollen (ex. swollen legs), be sure to contact your doctor or come to the ER.         SECONDARY DISCHARGE DIAGNOSES  Diagnosis: Diverticulitis  Assessment and Plan of Treatment: PRINCIPAL DISCHARGE DIAGNOSIS  Diagnosis: Acute on chronic diastolic heart failure  Assessment and Plan of Treatment: Continue all medications as prescribed. Be sure to follow up with your Primary Care Doctor and Cardiologist. Follow a low salt diet.      SECONDARY DISCHARGE DIAGNOSES  Diagnosis: Diverticulitis  Assessment and Plan of Treatment: Continue taking oral antibiotics as prescribed for another 4 days.    Diagnosis: Fracture of femoral neck, left  Assessment and Plan of Treatment: This is an old fracture. You may weight bear on the left lower extremity as tolerated. Ambulate with rolling walker.    Diagnosis: Acute kidney injury superimposed on CKD  Assessment and Plan of Treatment: Your kidney function tests are now stable. Continue drinking plenty of water daily. Take all medications as prescribed.    Diagnosis: Hypertension  Assessment and Plan of Treatment: Take medications as prescribed and maintain a low salt diet.    Diagnosis: Hypothyroidism  Assessment and Plan of Treatment: Take your synthroid medication as prescribed. PRINCIPAL DISCHARGE DIAGNOSIS  Diagnosis: Acute on chronic diastolic heart failure  Assessment and Plan of Treatment: Continue all medications as prescribed, including lasix 20mg oral daily. Be sure to follow up with your Primary Care Doctor and Cardiologist within 1 week of discharge. Follow a low salt diet. Measure your weight regularly, and if you experience weight gain with increased swelling in your legs or shortness of breath with exertion, call your doctor.      SECONDARY DISCHARGE DIAGNOSES  Diagnosis: Diverticulitis  Assessment and Plan of Treatment: Continue taking oral antibiotics as prescribed for another 4 days (until June 11th). Please seek medical attention if having severe diarrhea, fevers or abdominal pain.    Diagnosis: Fracture of femoral neck, left  Assessment and Plan of Treatment: This is an old fracture. You may weight bear on the left lower extremity as tolerated. Ambulate with rolling walker. Take Tylenol as needed for pain. Follow up with Orthopedic surgery within 2 weeks of discharge for repeat xray of the left hip.    Diagnosis: Acute kidney injury superimposed on CKD  Assessment and Plan of Treatment: Your kidney function tests are now stable. Continue drinking plenty of water daily. Take all medications as prescribed. Avoid medications like ibuprofen/advil/motrin that may worsen your kidney function. Follow up with Nephrology within 2 weeks of discharge.    Diagnosis: Hypertension  Assessment and Plan of Treatment: Take medications as prescribed and maintain a low salt diet.    Diagnosis: Hypothyroidism  Assessment and Plan of Treatment: Take your synthroid medication as prescribed. PRINCIPAL DISCHARGE DIAGNOSIS  Diagnosis: Acute on chronic diastolic heart failure  Assessment and Plan of Treatment: Continue all medications as prescribed, including lasix 20mg oral daily. Be sure to follow up with your Primary Care Doctor and Cardiologist within 1 week of discharge. Follow a low salt diet. Measure your weight regularly, and if you experience weight gain with increased swelling in your legs or shortness of breath with exertion, call your doctor.      SECONDARY DISCHARGE DIAGNOSES  Diagnosis: Diverticulitis  Assessment and Plan of Treatment: Continue taking oral antibiotics as prescribed for another 4 days (until June 11th). Please seek medical attention if having severe diarrhea, fevers or abdominal pain.    Diagnosis: Fracture of femoral neck, left  Assessment and Plan of Treatment: This is an old fracture. You may weight bear on the left lower extremity as tolerated. Ambulate with rolling walker. Take Tylenol as needed for pain. Follow up with Orthopedic surgery within 2 weeks of discharge for repeat xray of the left hip.    Diagnosis: Acute kidney injury superimposed on CKD  Assessment and Plan of Treatment: Your kidney function tests are now stable. Continue drinking plenty of water daily. Take all medications as prescribed. Avoid medications like ibuprofen/advil/motrin that may worsen your kidney function. Follow up with Nephrology within 2 weeks of discharge.    Diagnosis: Hypertension  Assessment and Plan of Treatment: We made some changes to your medications. We started you on a medication called hydralazine. Also STOP taking Nifedipine. Maintain a low salt diet. Check your blood pressure daily and seek medical attention if the top number is always above 150.    Diagnosis: Hypothyroidism  Assessment and Plan of Treatment: Take your synthroid medication as prescribed. PRINCIPAL DISCHARGE DIAGNOSIS  Diagnosis: Acute on chronic diastolic heart failure  Assessment and Plan of Treatment: Continue all medications as prescribed, including lasix 20mg oral daily. Be sure to follow up with your Primary Care Doctor and Cardiologist within 1 week of discharge. Follow a low salt diet. Fluid restriction 1 liter. Measure your weight regularly, and if you experience weight gain with increased swelling in your legs or shortness of breath with exertion, call your doctor.      SECONDARY DISCHARGE DIAGNOSES  Diagnosis: Hypothyroidism  Assessment and Plan of Treatment: Take your synthroid medication as prescribed.    Diagnosis: Hypertension  Assessment and Plan of Treatment: We made some changes to your medications. We started you on a medication called hydralazine. Also STOP taking Nifedipine. Maintain a low salt diet. Check your blood pressure daily and seek medical attention if the top number is always above 150.    Diagnosis: Acute kidney injury superimposed on CKD  Assessment and Plan of Treatment: CKD 3- baseline creatine 2-3 range. Your kidney function tests are now stable. Continue drinking plenty of water daily. Take all medications as prescribed. Avoid medications like ibuprofen/advil/motrin that may worsen your kidney function. Follow up with Nephrology within 2 weeks of discharge.    Diagnosis: Fracture of femoral neck, left  Assessment and Plan of Treatment: This is an old fracture. You may weight bear on the left lower extremity as tolerated. Ambulate with rolling walker. Take Tylenol as needed for pain. Follow up with Orthopedic surgery within 2 weeks of discharge for repeat xray of the left hip.    Diagnosis: Diverticulitis  Assessment and Plan of Treatment: Continue taking oral antibiotics as prescribed for another 4 days (until June 11th). Please seek medical attention if having severe diarrhea, fevers or abdominal pain.

## 2019-06-02 NOTE — CONSULT NOTE ADULT - ASSESSMENT
89 yo female with pmhx of Chronic diastolic CHF, moderate AS, hypertension, hypothyroid, breast cancer s/p lumpectomy and radiation in 2007, gallstone pancreatitis in April 2019 presents to ED with complaint of chest pressure, SOB and elevated proBNP concerning for acute CHF exacerbation.   CHF - on lasix - watch Creatinine  Has know CKD - S3 or 4 - creat was 1.-3 in April 2019  K sl elevated - recd kayexalate and Albuterol  Anemia - Tsat 30; Procrit as needed  d/w family  shall follow   Thanks

## 2019-06-02 NOTE — DISCHARGE NOTE PROVIDER - INSTRUCTIONS
Low salt diet.  Ensure nutrition supplement, three times a day with meals.  Take daily multivitamin.

## 2019-06-02 NOTE — DISCHARGE NOTE PROVIDER - CARE PROVIDER_API CALL
Ninfa Banda  [address] [place] [address]  Phone: [phone]  Phone: (   )    -  Fax: (   )    -  Follow Up Time:     [facility],   Phone: (   )    -  Fax: (   )    -  Follow Up Time: Ninfa Banda  [address] [place] [address]  Phone: [phone]  Phone: (   )    -  Fax: (   )    -  Follow Up Time:     [facility],   Phone: (   )    -  Fax: (   )    -  Follow Up Time:     Kyle Isaac)  Orthopaedic Surgery  [address] [facility] [address] [place] [address]  Phone: [phone]  Fax: [phone]  Follow Up Time: Ninfa Banda  [address] [place] [address]  Phone: [phone]  Phone: (   )    -  Fax: (   )    -  Follow Up Time:     [facility],   [address] [place] [address], [place], [place] [address]  PHONE: [phone]  FAX: [phone]  Phone: (   )    -  Fax: (   )    -  Follow Up Time: Sina Leon (DO)  Medicine  [address] [facility], [place] [address]  Phone: [phone]  Fax: [phone]  Follow Up Time:     Ninfa Banda  [address] [place] [address]  Phone: [phone]  Phone: (   )    -  Fax: (   )    -  Follow Up Time:     [facility],   [address] [place] [address]  PHONE: [phone]  FAX: [phone]  Phone: (   )    -  Fax: (   )    -  Follow Up Time:     Major Garcia)  Orthopaedic Surgery  [address] [place] [address]  Phone: [phone]  Fax: [phone]  Follow Up Time:

## 2019-06-02 NOTE — SWALLOW BEDSIDE ASSESSMENT ADULT - SWALLOW EVAL: DIAGNOSIS
Suspected mild pharyngeal dysphagia for thin liquids. Mildly reduced hyolaryngeal excursion noted via manual palpation. Swallow judged to be functional for regular solids and nectar thick liquids with no OVERT s/s of aspiration/penetration noted

## 2019-06-03 ENCOUNTER — APPOINTMENT (OUTPATIENT)
Dept: ENDOCRINOLOGY | Facility: CLINIC | Age: 84
End: 2019-06-03

## 2019-06-03 LAB
ANION GAP SERPL CALC-SCNC: 16 MMOL/L — SIGNIFICANT CHANGE UP (ref 5–17)
BUN SERPL-MCNC: 37 MG/DL — HIGH (ref 8–20)
CALCIUM SERPL-MCNC: 9.5 MG/DL — SIGNIFICANT CHANGE UP (ref 8.6–10.2)
CHLORIDE SERPL-SCNC: 103 MMOL/L — SIGNIFICANT CHANGE UP (ref 98–107)
CK SERPL-CCNC: 57 U/L — SIGNIFICANT CHANGE UP (ref 25–170)
CO2 SERPL-SCNC: 22 MMOL/L — SIGNIFICANT CHANGE UP (ref 22–29)
CREAT SERPL-MCNC: 2.57 MG/DL — HIGH (ref 0.5–1.3)
GLUCOSE BLDC GLUCOMTR-MCNC: 108 MG/DL — HIGH (ref 70–99)
GLUCOSE BLDC GLUCOMTR-MCNC: 113 MG/DL — HIGH (ref 70–99)
GLUCOSE BLDC GLUCOMTR-MCNC: 128 MG/DL — HIGH (ref 70–99)
GLUCOSE SERPL-MCNC: 113 MG/DL — SIGNIFICANT CHANGE UP (ref 70–115)
HCT VFR BLD CALC: 30 % — LOW (ref 37–47)
HGB BLD-MCNC: 9.7 G/DL — LOW (ref 12–16)
MAGNESIUM SERPL-MCNC: 2.5 MG/DL — SIGNIFICANT CHANGE UP (ref 1.6–2.6)
MCHC RBC-ENTMCNC: 32.3 G/DL — SIGNIFICANT CHANGE UP (ref 32–36)
MCHC RBC-ENTMCNC: 33.3 PG — HIGH (ref 27–31)
MCV RBC AUTO: 103.1 FL — HIGH (ref 81–99)
PHOSPHATE SERPL-MCNC: 4.3 MG/DL — SIGNIFICANT CHANGE UP (ref 2.4–4.7)
PLATELET # BLD AUTO: 185 K/UL — SIGNIFICANT CHANGE UP (ref 150–400)
POTASSIUM SERPL-MCNC: 4 MMOL/L — SIGNIFICANT CHANGE UP (ref 3.5–5.3)
POTASSIUM SERPL-SCNC: 4 MMOL/L — SIGNIFICANT CHANGE UP (ref 3.5–5.3)
RBC # BLD: 2.91 M/UL — LOW (ref 4.4–5.2)
RBC # FLD: 15.3 % — SIGNIFICANT CHANGE UP (ref 11–15.6)
SODIUM SERPL-SCNC: 141 MMOL/L — SIGNIFICANT CHANGE UP (ref 135–145)
TROPONIN T SERPL-MCNC: 0.04 NG/ML — SIGNIFICANT CHANGE UP (ref 0–0.06)
WBC # BLD: 4.5 K/UL — LOW (ref 4.8–10.8)
WBC # FLD AUTO: 4.5 K/UL — LOW (ref 4.8–10.8)

## 2019-06-03 PROCEDURE — 99223 1ST HOSP IP/OBS HIGH 75: CPT

## 2019-06-03 PROCEDURE — 99232 SBSQ HOSP IP/OBS MODERATE 35: CPT | Mod: GC

## 2019-06-03 PROCEDURE — 93010 ELECTROCARDIOGRAM REPORT: CPT

## 2019-06-03 RX ORDER — FUROSEMIDE 40 MG
20 TABLET ORAL DAILY
Refills: 0 | Status: DISCONTINUED | OUTPATIENT
Start: 2019-06-04 | End: 2019-06-04

## 2019-06-03 RX ORDER — PANTOPRAZOLE SODIUM 20 MG/1
40 TABLET, DELAYED RELEASE ORAL DAILY
Refills: 0 | Status: DISCONTINUED | OUTPATIENT
Start: 2019-06-03 | End: 2019-06-04

## 2019-06-03 RX ADMIN — Medication 5 MILLIGRAM(S): at 03:53

## 2019-06-03 RX ADMIN — Medication 325 MILLIGRAM(S): at 15:53

## 2019-06-03 RX ADMIN — Medication 325 MILLIGRAM(S): at 15:21

## 2019-06-03 RX ADMIN — Medication 5 MILLIGRAM(S): at 08:09

## 2019-06-03 RX ADMIN — Medication 81 MILLIGRAM(S): at 13:31

## 2019-06-03 RX ADMIN — Medication 25 MILLIGRAM(S): at 05:22

## 2019-06-03 RX ADMIN — HEPARIN SODIUM 5000 UNIT(S): 5000 INJECTION INTRAVENOUS; SUBCUTANEOUS at 13:33

## 2019-06-03 RX ADMIN — Medication 25 MICROGRAM(S): at 05:22

## 2019-06-03 RX ADMIN — Medication 100 MILLIGRAM(S): at 18:06

## 2019-06-03 RX ADMIN — Medication 200 MILLIGRAM(S): at 05:35

## 2019-06-03 RX ADMIN — Medication 20 MILLIGRAM(S): at 05:21

## 2019-06-03 RX ADMIN — Medication 325 MILLIGRAM(S): at 21:40

## 2019-06-03 RX ADMIN — Medication 100 MILLIGRAM(S): at 07:07

## 2019-06-03 RX ADMIN — PANTOPRAZOLE SODIUM 40 MILLIGRAM(S): 20 TABLET, DELAYED RELEASE ORAL at 08:09

## 2019-06-03 RX ADMIN — HEPARIN SODIUM 5000 UNIT(S): 5000 INJECTION INTRAVENOUS; SUBCUTANEOUS at 05:21

## 2019-06-03 RX ADMIN — Medication 325 MILLIGRAM(S): at 22:40

## 2019-06-03 RX ADMIN — HEPARIN SODIUM 5000 UNIT(S): 5000 INJECTION INTRAVENOUS; SUBCUTANEOUS at 21:41

## 2019-06-03 RX ADMIN — Medication 25 MILLIGRAM(S): at 18:06

## 2019-06-03 RX ADMIN — Medication 5 MILLIGRAM(S): at 19:40

## 2019-06-03 NOTE — CONSULT NOTE ADULT - ASSESSMENT
89F admitted for acute decompensated heart failure, acute diverticulitis, incidental finding of left femoral neck fracture and NAVIN.     PLAN    Acute decompensated heart failure/HFpEF  - improved dyspnea  - diuretics on hold due to worsening NAVIN pending renal eval  - cardiology on board, notes reviewed  - c/w O2 supplement    Acute Diverticulitis  - c/w IV cipro and metronidazole    NAVIN/CKD  - worsening cr, etiology unclear ?med related  - nephrology consulted, note reviewed    Left Femoral Neck Fracture  - found on CT, seen by ortho whom recommend MRI however pt is refusing  - WBAT per ortho noted  - pt denies any recent falls or acute associated pain at site  - pain control PRN     Palliative Care Encounter  Met with patient to introduce palliative care. She was preoccupied with headache and wanted pain meds which I notified RN. Granddaughter was in earlier and dtr to be present later tonight. Pt is hopeful to return home where she resides with dtr. Chart reviewed and hospice team has reached out to dtr, awaiting call back. I will follow up on their conversation for ongoing goc.

## 2019-06-03 NOTE — PROGRESS NOTE ADULT - ASSESSMENT
CKD /NAVIN - creat increased from 2-> 2.57  Cause unclear; on Cipro and also Protonix which can increase creatinine  watch on present meds, may need to change if creat keeps rising  labs am

## 2019-06-03 NOTE — PROGRESS NOTE ADULT - SUBJECTIVE AND OBJECTIVE BOX
Chief Complaint: chart and hx reviewed.    HPI: 89 yo F admitted with dyspnea. Hx of mild to mod AS/hpt/remote right breast cancer. Denies MI or cva. Hx of CRI. Recent echo revealed mild AS and LVEF 70+%. Allergy to pcn and sulfa.    PAST MEDICAL & SURGICAL HISTORY:  Chronic CHF  Glaucoma  HTN (hypertension)  Breast cancer: s/p RT lumpectomy and radiation  Hypothyroid  S/P cataract extraction: bilateral  S/P lumpectomy, right breast      PREVIOUS DIAGNOSTIC TESTING:      ECHO  FINDINGS: see above    STRESS  FINDINGS:    CATHETERIZATION  FINDINGS:    MEDICATIONS  (STANDING):  aspirin  chewable 81 milliGRAM(s) Oral daily  ciprofloxacin   IVPB 400 milliGRAM(s) IV Intermittent every 24 hours  ciprofloxacin   IVPB      dextrose 5%. 1000 milliLiter(s) (50 mL/Hr) IV Continuous <Continuous>  dextrose 50% Injectable 12.5 Gram(s) IV Push once  dextrose 50% Injectable 25 Gram(s) IV Push once  dextrose 50% Injectable 25 Gram(s) IV Push once  furosemide   Injectable 20 milliGRAM(s) IV Push daily  heparin  Injectable 5000 Unit(s) SubCutaneous every 8 hours  hydrALAZINE 25 milliGRAM(s) Oral two times a day  levothyroxine 25 MICROGram(s) Oral daily  metroNIDAZOLE  IVPB 500 milliGRAM(s) IV Intermittent every 12 hours  pantoprazole  Injectable 40 milliGRAM(s) IV Push daily    MEDICATIONS  (PRN):  acetaminophen   Tablet .. 325 milliGRAM(s) Oral every 4 hours PRN Mild Pain (1 - 3), Moderate Pain (4 - 6)  dextrose 40% Gel 15 Gram(s) Oral once PRN Blood Glucose LESS THAN 70 milliGRAM(s)/deciLiter  glucagon  Injectable 1 milliGRAM(s) IntraMuscular once PRN Glucose <70 milliGRAM(s)/deciLiter  hydrALAZINE Injectable 5 milliGRAM(s) IV Push every 4 hours PRN for SBP > 170 or DBPO > 100      FAMILY HISTORY:  Family history of brain cancer: Brother  Family history of uterine cancer: Sister - cervical/uterine  Family hx of lung cancer: 2 Brothers      ROS: Negative other than as mentioned in HPI.    Vital Signs Last 24 Hrs  T(C): 36.3 (03 Jun 2019 05:00), Max: 36.7 (02 Jun 2019 15:14)  T(F): 97.4 (03 Jun 2019 05:00), Max: 98 (02 Jun 2019 15:14)  HR: 70 reg (03 Jun 2019 08:03) (72 - 85)  BP: 120/80 RA manually (03 Jun 2019 08:03) (96/43 - 192/80)  BP(mean): --  RR: 14 flat ora (03 Jun 2019 06:54) (16 - 20)  SpO2: 96% (03 Jun 2019 06:54) (94% - 98%)    PHYSICAL EXAM:  General: Appears well developed, well nourished alert and cooperative. Thin kyphotic very elderly and frail looking F [place] NAD and afebrile  HEENT: Head; normocephalic, atraumatic.  Eyes;   Pupils reactive, cornea wnl.  Neck; Supple, no nodes adenopathy, no NVD or carotid bruit or thyromegaly.  CARDIOVASCULAR; Soft basal systolic murmur, No  rub, gallop or lift. Normal S1 and S2.  LUNGS; decreased BS right base.  ABDOMEN ; Soft, nontender without mass or organomegaly. bowel sounds normoactive.  EXTREMITIES; No clubbing, cyanosis or edema. Distal pulses ? ROM normal.  SKIN; warm and dry with normal turgor.  NEURO; Alert/oriented x 3/normal motor exam.   PSYCH; normal affect.            INTERPRETATION OF TELEMETRY:    ECG: SR LVH  cxr: mild PVH and small right pl effusion.    I&O's Detail    02 Jun 2019 07:01  -  03 Jun 2019 07:00  --------------------------------------------------------  IN:    IV PiggyBack: 200 mL  Total IN: 200 mL    OUT:    Voided: 300 mL  Total OUT: 300 mL    Total NET: -100 mL      03 Jun 2019 07:01  -  03 Jun 2019 10:11  --------------------------------------------------------  IN:  Total IN: 0 mL    OUT:    Voided: 300 mL  Total OUT: 300 mL    Total NET: -300 mL          LABS:                        9.7    4.5   )-----------( 185      ( 03 Jun 2019 06:18 )             30.0     06-03    141  |  103  |  37.0<H>  ----------------------------<  113  4.0   |  22.0  |  2.57<H>    Ca    9.5      03 Jun 2019 06:18  Phos  4.3     06-03  Mg     2.5     06-03    TPro  7.5  /  Alb  3.8  /  TBili  0.5  /  DBili  x   /  AST  26  /  ALT  22  /  AlkPhos  133<H>  06-01    CARDIAC MARKERS ( 03 Jun 2019 06:18 )  x     / 0.04 ng/mL / 57 U/L / x     / x      CARDIAC MARKERS ( 02 Jun 2019 12:57 )  x     / 0.04 ng/mL / 49 U/L / x     / x      CARDIAC MARKERS ( 02 Jun 2019 04:16 )  x     / 0.03 ng/mL / 51 U/L / x     / x      CARDIAC MARKERS ( 01 Jun 2019 20:09 )  x     / 0.02 ng/mL / x     / x     / x          PT/INR - ( 01 Jun 2019 20:09 )   PT: 9.9 sec;   INR: 0.86 ratio         PTT - ( 01 Jun 2019 20:09 )  PTT:44.1 sec    I&O's Summary    02 Jun 2019 07:01  -  03 Jun 2019 07:00  --------------------------------------------------------  IN: 200 mL / OUT: 300 mL / NET: -100 mL    03 Jun 2019 07:01  -  03 Jun 2019 10:11  --------------------------------------------------------  IN: 0 mL / OUT: 300 mL / NET: -300 mL        RADIOLOGY & ADDITIONAL STUDIES: Chief Complaint: chart and hx reviewed.    HPI: 89 yo F admitted with dyspnea. Hx of mild to mod AS/hpt/remote right breast cancer. Denies MI or cva. Hx of CRI. Recent echo revealed mild AS and LVEF 70+%. Allergy to pcn and sulfa.    PAST MEDICAL & SURGICAL HISTORY:  Chronic CHF  Glaucoma  HTN (hypertension)  Breast cancer: s/p RT lumpectomy and radiation  Hypothyroid  S/P cataract extraction: bilateral  S/P lumpectomy, right breast      PREVIOUS DIAGNOSTIC TESTING:      ECHO  FINDINGS: see above    STRESS  FINDINGS:    CATHETERIZATION  FINDINGS:    MEDICATIONS  (STANDING):  aspirin  chewable 81 milliGRAM(s) Oral daily  ciprofloxacin   IVPB 400 milliGRAM(s) IV Intermittent every 24 hours  ciprofloxacin   IVPB      dextrose 5%. 1000 milliLiter(s) (50 mL/Hr) IV Continuous <Continuous>  dextrose 50% Injectable 12.5 Gram(s) IV Push once  dextrose 50% Injectable 25 Gram(s) IV Push once  dextrose 50% Injectable 25 Gram(s) IV Push once  furosemide   Injectable 20 milliGRAM(s) IV Push daily  heparin  Injectable 5000 Unit(s) SubCutaneous every 8 hours  hydrALAZINE 25 milliGRAM(s) Oral two times a day  levothyroxine 25 MICROGram(s) Oral daily  metroNIDAZOLE  IVPB 500 milliGRAM(s) IV Intermittent every 12 hours  pantoprazole  Injectable 40 milliGRAM(s) IV Push daily    MEDICATIONS  (PRN):  acetaminophen   Tablet .. 325 milliGRAM(s) Oral every 4 hours PRN Mild Pain (1 - 3), Moderate Pain (4 - 6)  dextrose 40% Gel 15 Gram(s) Oral once PRN Blood Glucose LESS THAN 70 milliGRAM(s)/deciLiter  glucagon  Injectable 1 milliGRAM(s) IntraMuscular once PRN Glucose <70 milliGRAM(s)/deciLiter  hydrALAZINE Injectable 5 milliGRAM(s) IV Push every 4 hours PRN for SBP > 170 or DBPO > 100      FAMILY HISTORY:  Family history of brain cancer: Brother  Family history of uterine cancer: Sister - cervical/uterine  Family hx of lung cancer: 2 Brothers      ROS: Negative other than as mentioned in HPI.    Vital Signs Last 24 Hrs  T(C): 36.3 (03 Jun 2019 05:00), Max: 36.7 (02 Jun 2019 15:14)  T(F): 97.4 (03 Jun 2019 05:00), Max: 98 (02 Jun 2019 15:14)  HR: 70 reg (03 Jun 2019 08:03) (72 - 85)  BP: 120/80 RA manually (03 Jun 2019 08:03) (96/43 - 192/80)  BP(mean): --  RR: 14 flat ora (03 Jun 2019 06:54) (16 - 20)  SpO2: 96% (03 Jun 2019 06:54) (94% - 98%)    PHYSICAL EXAM:  General: Appears well developed, well nourished alert and cooperative. Thin kyphotic very elderly and frail looking F [place] NAD and afebrile  HEENT: Head; normocephalic, atraumatic.  Eyes;   Pupils reactive, cornea wnl.  Neck; Supple, no nodes adenopathy, no NVD or carotid bruit or thyromegaly.  CARDIOVASCULAR; Soft basal systolic murmur, No  rub, gallop or lift. Normal S1 and S2.  LUNGS; decreased BS right base.  ABDOMEN ; Soft, nontender without mass or organomegaly. bowel sounds normoactive.  EXTREMITIES; No clubbing, cyanosis or edema. Distal pulses ? ROM normal.  SKIN; warm and dry with normal turgor.  NEURO; Alert/oriented x 3/normal motor exam.   PSYCH; normal affect.            INTERPRETATION OF TELEMETRY:    ECG: SR LVH  cxr: mild PVH and small right pl effusion.    I&O's Detail    02 Jun 2019 07:01  -  03 Jun 2019 07:00  --------------------------------------------------------  IN:    IV PiggyBack: 200 mL  Total IN: 200 mL    OUT:    Voided: 300 mL  Total OUT: 300 mL    Total NET: -100 mL      03 Jun 2019 07:01  -  03 Jun 2019 10:11  --------------------------------------------------------  IN:  Total IN: 0 mL    OUT:    Voided: 300 mL  Total OUT: 300 mL    Total NET: -300 mL          LABS: pro bnp 4300                        9.7    4.5   )-----------( 185      ( 03 Jun 2019 06:18 )             30.0     06-03    141  |  103  |  37.0<H>  ----------------------------<  113  4.0   |  22.0  |  2.57<H>    Ca    9.5      03 Jun 2019 06:18  Phos  4.3     06-03  Mg     2.5     06-03    TPro  7.5  /  Alb  3.8  /  TBili  0.5  /  DBili  x   /  AST  26  /  ALT  22  /  AlkPhos  133<H>  06-01    CARDIAC MARKERS ( 03 Jun 2019 06:18 )  x     / 0.04 ng/mL / 57 U/L / x     / x      CARDIAC MARKERS ( 02 Jun 2019 12:57 )  x     / 0.04 ng/mL / 49 U/L / x     / x      CARDIAC MARKERS ( 02 Jun 2019 04:16 )  x     / 0.03 ng/mL / 51 U/L / x     / x      CARDIAC MARKERS ( 01 Jun 2019 20:09 )  x     / 0.02 ng/mL / x     / x     / x          PT/INR - ( 01 Jun 2019 20:09 )   PT: 9.9 sec;   INR: 0.86 ratio         PTT - ( 01 Jun 2019 20:09 )  PTT:44.1 sec    I&O's Summary    02 Jun 2019 07:01  -  03 Jun 2019 07:00  --------------------------------------------------------  IN: 200 mL / OUT: 300 mL / NET: -100 mL    03 Jun 2019 07:01  -  03 Jun 2019 10:11  --------------------------------------------------------  IN: 0 mL / OUT: 300 mL / NET: -300 mL        RADIOLOGY & ADDITIONAL STUDIES:

## 2019-06-03 NOTE — PROGRESS NOTE ADULT - ASSESSMENT
89 yo female with hx of Chronic diastolic CHFEF70%, mild AS, compression fx [id number], hypothyroid, breast cancer s/p lumpectomy and radiation in 2007, gallstone pancreatitis in April 2019 who currently presented to the ED with complaint of chest pressure and SOB  noted with elevated proBNP as well as mod b/l pleural effusions on imaging and admitted with acute on chronic diastolic HF exacerbation. Patient initially receiving diuresis, cardio continues to follow the pt. Also incidentally found to have acute sigmoid diverticulitis, likely chest and abd pain is also from this, elevated procalcitonin and initial hypothermia, empirically placed on cipro and flagyl with improvement in hypothermia. Hospital course further complicated by noted left femoral neck fracture, for which ortho consulted and recommended MRI but the patient is refusing. Slow clinical improvement.   Lasix stopped today due to NAVIN on CKD, pending nephrology recommendations at this time     Acute on chronic diastolic CHF  -Pt with improving sob - elevated probnp   -cardiac enzymes negative   -held lasix 20mg IV QD today (s/p 3 doses) due to worsening kidney function. Waiting for nephro recommendations   -strict i/os, daily weights   - fluid restriction   -c/w supplemental O2 as needed   - Prior recent April 2019 2D echo noted. Only mild AS on report. EF 70%.  -cardio consult noted and appreciated  -Trops neg x3. EKG today w/o signs of acute ischemia    Acute nondisplaced subcapital left femoral neck fracture  -pt denies hx of fall/ trauma  - states she has been ambulating with her walker without complications  - found on CT abd/pelvis    -X-rays completed and also showed left femoral neck fracture   -ortho consult noted and appreciated, recommended MRI of the left hip for further info but patient is refusing the test due to calustrophobia and does not want any type of sedation or medication to calm her down for the test to be obtained. Patient denies any symptom and days that ambulates at home with her cane and/or walker. MRI with sedation probably difficult for this patient due to comorbidities and age. Will do PT     Acute diverticulitis  -c/w ciprofloxacin 400 mg iv q24h D#1  -c/w metronidazole 500 mg iv q24h D#1  - c/w PPI   - Note on ct imaging   - pt will need repeat imaging in 4-6 weeks and colonoscopy as an outpatient     NAVIN on CKD Stage 4  - Creatine worsened today: 2.57; prior from April was ~ 1.8-2. Stopped Lasix today and waiting for nephro recommendations  - hyperkalemia resolved   -avoid nephrotoxic meds  -will monitor renal fxn closely on diuresis     Hypertension  - bp stable   -c/w Hydralazine 25 mg po bid    Macrocytic anemia  -Anemia panel noted  - hgb stable ~9     Hypothyroidism  -c/w Synthroid 25 mcg po qdaily    DVT ppx;  -c/w heparin sq     Advanced Directives: DNR/DNI  - prior MOLST completed  Next of kin: Daughter Morena    Dispo: Given possible hip fx, will need PT, at baseline pt is ambulatory with walker. Anticipated discharge in 4-5 days. 89 yo female with hx of Chronic diastolic CHFEF70%, mild AS, compression fx [id number], hypothyroid, breast cancer s/p lumpectomy and radiation in 2007, gallstone pancreatitis in April 2019 who currently presented to the ED with complaint of chest pressure and SOB  noted with elevated proBNP as well as mod b/l pleural effusions on imaging and admitted with acute on chronic diastolic HF exacerbation. Patient initially receiving IV diuresis switched to po today, known ckd 4 and monitoring creatine on diuresis. Also incidentally found to have acute sigmoid diverticulitis, likely chest and abd pain is also from this, elevated procalcitonin and initial hypothermia, empirically placed on cipro and flagyl with improvement in hypothermia and sx. Hospital course further complicated by noted left femoral neck fracture, for which ortho consulted and recommended MRI but the patient is refusing. Given refual and likely injury in January, will be treated as chronic left femoral neck fx and pt will be WBAT. PT consulted for dispo. Slow clinical improvement.      Acute on chronic diastolic CHF  -Pt with improving sob - elevated probnp   -cardiac enzymes negative   -changed lasix to 20mg po qd in the am   - d/w nephro bc creatine up trend to 2.5; but prior hospitalization with cr ~2-3 range and renal fxn will c/w monitored on diuresis   -strict i/os, daily weights   - fluid restriction   -c/w supplemental O2 as needed, will try to titrate off as needed   - Prior recent April 2019 2D echo noted. Only mild AS on report. EF 70%.  -cardio f/u noted and appreciated     Acute nondisplaced subcapital left femoral neck fracture  -pt reports injury in January and  states she has been ambulating with her walker without complications. Likely chronic fracture.  - found on CT abd/pelvis    -X-rays completed and also showed left femoral neck fracture   -ortho consult noted and appreciated, recommended MRI of the left hip for further info but patient is refusing the test due to calustrophobia. Also likely chronic fx, recommended WBAT with walker.   - PT consulted for further recommendations.     Acute diverticulitis  - pt is asx tolerating diet well   -c/w ciprofloxacin 400 mg iv q24h D#2  -c/w metronidazole 500 mg iv q24h D#2  - c/w PPI   - Note on ct imaging   - pt will need repeat imaging in 4-6 weeks and colonoscopy as an outpatient     NAVIN on CKD Stage 4  - Creatine worsened today: 2.57; prior from April was ~ 2-3  - changed lasix to po and d/w nephro will c/w monitoring no need for discontinuing lasix at this time   - hyperkalemia resolved   -avoid nephrotoxic meds  -will monitor renal fxn closely on diuresis     Hypertension  - bp stable   -c/w Hydralazine 25 mg po bid    Macrocytic anemia  -Anemia panel noted  - hgb stable ~9     Hypothyroidism  -c/w Synthroid 25 mcg po qdaily    DVT ppx;  -c/w heparin sq     Advanced Directives: DNR/DNI  - prior MOLST completed  Next of kin: Daughter Morena  Patient is frail/ elderly with multiple co morbidities, on initial conversation with daughter she wanted patient to be DNR/DNI without aggressive measures but wanted MOLST changed to if the pt needed hospital medical intervention to be treated. Pt is still independent in ambulation with walker, given current state would benefit from palliative consult for further discussion in regards to home hospice as an option for disposition.     Dispo: PT consulted, pt is WBAT and at baseline ambulated with a rolling walker. Anticipated discharge in 1-2 days.

## 2019-06-03 NOTE — PROGRESS NOTE ADULT - ASSESSMENT
1. 89 yo F admitted with dyspnea. Has mild PVH and right pleural effusion on CXR. Echo shows hyperdynamic LV with mild AS. Likely diastolic chf-has been placed on IV lasix.  2. CRI-monitor GFR/creatinine-pt on IV lasix.  3. hpt  4. anemia-likely chronic

## 2019-06-03 NOTE — CONSULT NOTE ADULT - SUBJECTIVE AND OBJECTIVE BOX
PALLIATIVE CONSULT    CC: Patient is a 89y old  Female who presents with a chief complaint of acute CHF exacerbation (03 Jun 2019 16:31)    HPI: Mrs. Figueroa is a 89F with PMHx of CHF, HTN, CKD, glaucoma, hard of hearing admitted for chest pain and dyspnea. She was found to have acute decompensated heart failure. Recent hospitalization at [facility] 5/4-5/10 for acute pancreatitis, refused cholecystectomy, acute CHF, NAVIN discharged to [facility]. Workup also revealed acute diverticulitis, an acute nondisplaced left femoral neck fx on XR which pt has refused further imaging studies and NAVIN. Palliative consulted for support and ongoing goc. Pt is known to our services, seen during last hospitalization.     Pt seen and examined earlier today. She is reporting of headache, asking for Tyelnol; RN made aware.     Present Symptoms:     Dyspnea: Yes, better since admission  Nausea/Vomiting:  No  Anxiety:  Yes    Fatigue: Yes    Loss of appetite: Yes    Pain: headache described as pressure otherwise denies any other acute pain          Review of Systems: As per HPI.  All others negative  +poor vision due to glaucoma  +hard of hearing    PERTINENT PMH REVIEWED: Yes     PAST MEDICAL & SURGICAL HISTORY:  Chronic CHF  Glaucoma  HTN (hypertension)  Breast cancer: s/p RT lumpectomy and radiation  Hypothyroid  S/P cataract extraction: bilateral  S/P lumpectomy, right breast      SOCIAL HISTORY:    Admitted from:  home, lives at home with dtr    Baseline ADLs (prior to admission):  some independence   Karnofsky: 50 %    Surrogate/HCP/Guardian: Phone#:  - HCP: Morena Romero  [phone]    ADVANCE DIRECTIVES:   DNR YES NO  Completed on:        4/10/19             MOLST  YES NO   Completed on: 4/10/19    Living Will  YES NO   Completed on:    FAMILY HISTORY:  Family history of brain cancer: Brother  Family history of uterine cancer: Sister - cervical/uterine  Family hx of lung cancer: 2 Brothers       Allergies    codeine (Vomiting)  penicillin (Rash; Anaphylaxis)  Sulfacetamide Sodium (Rash)    Intolerances        MEDICATIONS  (STANDING):  aspirin  chewable 81 milliGRAM(s) Oral daily  ciprofloxacin   IVPB 400 milliGRAM(s) IV Intermittent every 24 hours  ciprofloxacin   IVPB      dextrose 5%. 1000 milliLiter(s) (50 mL/Hr) IV Continuous <Continuous>  dextrose 50% Injectable 12.5 Gram(s) IV Push once  dextrose 50% Injectable 25 Gram(s) IV Push once  dextrose 50% Injectable 25 Gram(s) IV Push once  heparin  Injectable 5000 Unit(s) SubCutaneous every 8 hours  hydrALAZINE 25 milliGRAM(s) Oral two times a day  levothyroxine 25 MICROGram(s) Oral daily  metroNIDAZOLE  IVPB 500 milliGRAM(s) IV Intermittent every 12 hours  pantoprazole  Injectable 40 milliGRAM(s) IV Push daily    MEDICATIONS  (PRN):  acetaminophen   Tablet .. 325 milliGRAM(s) Oral every 4 hours PRN Mild Pain (1 - 3), Moderate Pain (4 - 6)  dextrose 40% Gel 15 Gram(s) Oral once PRN Blood Glucose LESS THAN 70 milliGRAM(s)/deciLiter  glucagon  Injectable 1 milliGRAM(s) IntraMuscular once PRN Glucose <70 milliGRAM(s)/deciLiter  hydrALAZINE Injectable 5 milliGRAM(s) IV Push every 4 hours PRN for SBP > 170 or DBPO > 100      PHYSICAL EXAM:    Vital Signs Last 24 Hrs  T(C): 36.3 (03 Jun 2019 15:55), Max: 36.4 (02 Jun 2019 23:34)  T(F): 97.4 (03 Jun 2019 15:55), Max: 97.6 (02 Jun 2019 23:34)  HR: 77 (03 Jun 2019 17:28) (73 - 79)  BP: 178/62 (03 Jun 2019 17:28) (116/61 - 192/80)  BP(mean): --  RR: 16 (03 Jun 2019 17:28) (16 - 20)  SpO2: 97% (03 Jun 2019 17:28) (94% - 98%)    General: elderly.   No acute distress.   HEENT: mucous membrane moist  bitemporal wasting  Lungs: diminished breath sounds at bases. non-labored. O2NC  CV: +s1/s2. Regular rate and rhythm.  +murmur  GI:+ bowel sound. abdomen soft, non-tender, non-distended.  MSK: Moves all 4 extremities.  No cyanosis or edema. weakness.   Neuro: Awake and alert, oriented x3. Interactive   Skin: warm and dry.      LABS:                        9.7    4.5   )-----------( 185      ( 03 Jun 2019 06:18 )             30.0     06-03    141  |  103  |  37.0<H>  ----------------------------<  113  4.0   |  22.0  |  2.57<H>    Ca    9.5      03 Jun 2019 06:18  Phos  4.3     06-03  Mg     2.5     06-03    TPro  7.5  /  Alb  3.8  /  TBili  0.5  /  DBili  x   /  AST  26  /  ALT  22  /  AlkPhos  133<H>  06-01    PT/INR - ( 01 Jun 2019 20:09 )   PT: 9.9 sec;   INR: 0.86 ratio         PTT - ( 01 Jun 2019 20:09 )  PTT:44.1 sec    RADIOLOGY & ADDITIONAL STUDIES:      CXR 6/1/19  FINDINGS:    Single frontal view of the chest demonstrates small bilateral pleural   effusions. The cardiomediastinal silhouette is normal. No acute osseous   abnormalities. Overlying EKG leads and wires are noted. Chronic right   humeral fracture deformity. Multiple chronic right-sided rib fracture   deformities.    IMPRESSION: Small bilateral pleural effusions, mildly improved.    XR Hip 6/2/19  Impression:  Left femoral neck fracture.    Doppler 6/2/19  IMPRESSION:   No evidence of bilateral lower extremity deep venous thrombosis.    CT A/P 6/2/19  FINDINGS:    Pleural space: Moderate bilateral pleural effusions with associated   compressive atelectasis.   ABDOMEN:   Liver: Normal. No mass.   Gallbladder and bile ducts: Cholelithiasis. No cholecystitis or biliary ductal dilatation.   Pancreas: Normal. No ductal dilation.   Spleen: Normal. No splenomegaly.   Adrenals: Normal. No mass.   Kidneys and ureters: Chronic medical renal disease. Small calcification   in the right kidney could be a nonobstructing stone or vascular calcification.   No obstructive uropathy.   Stomach and bowel: Mild acute diverticulitis of the proximal sigmoid colon. No   bowel wall thickening or intestinal obstruction.   Appendix: Appendix not visualized. No evidence of appendicitis.     PELVIS:    Bladder: Unremarkable as visualized.    Reproductive: Unremarkable as visualized.     ABDOMEN and PELVIS:    Intraperitoneal space: Normal. No free air. No significant fluid   collection.    Bones/joints: Acute appearing nondisplaced subcapital left femoral neck   fracture.    Soft tissues: Unremarkable.    Vasculature: Normal. No abdominal aortic aneurysm.    Lymph nodes: Normal. No enlarged lymph nodes.     IMPRESSION:   1. Mild acute diverticulitis of the proximal sigmoid colon.   2. Moderate bilateral pleural effusions with associated compressive   atelectasis.   3. Acute appearing nondisplaced subcapital left femoral neck fracture.        Thank you for the opportunity to assist with the care of this patient.   [facility] [phone].
Patient is a 89y old  Female who presents with a chief complaint of acute CHF exacerbation (02 Jun 2019 16:15)      HPI:  89 yo female with pmhx of moderate AS, hypertension, hypothyroid, breast cancer s/p lumpectomy and radiation in 2007, gallstone pancreatitis in April 2019 presents to ED with complaint of chest pressure that began last night. She says it feels like "someone is sitting on my chest." It is nonradiating, not worsened by breathing or position. In addition she reports SOB that began last night as well. She says she can walk to bathroom and then feel like she needs to lie down. She denies orthopnea, PND, palpitations, N/V, or calf pain. She was hospitalized in April at [facility] with gallstone pancreatitis which resolved, and NAVIN on CKD. (01 Jun 2019 23:50)      PAST MEDICAL & SURGICAL HISTORY:  Chronic CHF  Glaucoma  HTN (hypertension)  Breast cancer: s/p RT lumpectomy and radiation  Hypothyroid  S/P cataract extraction: bilateral  S/P lumpectomy, right breast      FAMILY HISTORY:  Family history of brain cancer: Brother  Family history of uterine cancer: Sister - cervical/uterine  Family hx of lung cancer: 2 Brothers      Social History:   -smoke   - Alcohol   -    Drugs        REVIEW OF SYSTEMS:  CONSTITUTIONAL: No fever, weight loss, or fatigue  EYES: No eye pain, No visual disturbances,   RESPIRATORY: No cough, hemoptysis; - SOB  CARDIOVASCULAR: No chest pain, No palpitations,   -leg swelling  GASTROINTESTINAL: No abdominal , NVD or GIB  GENITOURINARY: No UTI sx/hematuria  NEUROLOGICAL: No HA/wk/numbness  MUSCULOSKELETAL: No joint pain, No swelling      Vital Signs Last 24 Hrs  T(C): 36.4 (02 Jun 2019 17:37), Max: 36.7 (02 Jun 2019 01:02)  T(F): 97.6 (02 Jun 2019 17:37), Max: 98 (02 Jun 2019 01:02)  HR: 85 (02 Jun 2019 17:37) (70 - 93)  BP: 184/73 (02 Jun 2019 17:37) (96/43 - 184/73)  BP(mean): --  RR: 18 (02 Jun 2019 15:14) (16 - 18)  SpO2: 98% (02 Jun 2019 15:14) (93% - 98%)    PHYSICAL EXAM:    GENERAL: NAD,   EYES:  conjunctiva and sclera clear  NECK: Supple, No JVD/Carotid Bruit -ve   NERVOUS SYSTEM:  A/O x3,   Lungs : No rales, No rhonchi,   HEART:  No murmur,  No rub, No gallops  ABDOMEN: Soft, NT/ND BS+  EXTREMITIES:  + Peripheral Pulses, - edema  SKIN: No rashes or lesions      LABS:                        9.8    5.2   )-----------( 180      ( 02 Jun 2019 04:16 )             30.7     06-02    142  |  106  |  32.0<H>  ----------------------------<  71  4.3   |  21.0<L>  |  2.00<H>    Ca    9.6      02 Jun 2019 04:16  Phos  3.4     06-02  Mg     2.9     06-02    TPro  7.5  /  Alb  3.8  /  TBili  0.5  /  DBili  x   /  AST  26  /  ALT  22  /  AlkPhos  133<H>  06-01    PT/INR - ( 01 Jun 2019 20:09 )   PT: 9.9 sec;   INR: 0.86 ratio         PTT - ( 01 Jun 2019 20:09 )  PTT:44.1 sec    Magnesium, Serum: 2.9 mg/dL (06-02 @ 04:16)  Phosphorus Level, Serum: 3.4 mg/dL (06-02 @ 04:16)      RADIOLOGY & ADDITIONAL TESTS: ct Abd noted - bilat Pleff; No diverticulitis; no Hydro    MEDICATIONS  (STANDING):  acetaminophen   Tablet .. PRN  ciprofloxacin   IVPB  dextrose 40% Gel PRN  dextrose 5%.  dextrose 50% Injectable  dextrose 50% Injectable  dextrose 50% Injectable  furosemide   Injectable  glucagon  Injectable PRN  heparin  Injectable  hydrALAZINE  hydrALAZINE Injectable PRN  levothyroxine  metroNIDAZOLE  IVPB
Pt Name: LUIS MONET    MRN: [id number]      Patient is a 89y Female presenting to the emergency department with a chief complaint of chest pain and dyspnea  Patient is a 89y old  Female who presents with a chief complaint of acute CHF exacerbation (02 Jun 2019 05:08). patient was admitted yesterday for CHF exacerbation. patient had CT of AB/pelvis which revealed acute appearing left subcap fx.  patient denies fall, denies hip pain. patient states was walking prior to coming to ER.   .    HEALTH ISSUES - PROBLEM Dx: possible left subcap fx       .      REVIEW OF SYSTEMS      General:	    Skin/Breast:  	  Ophthalmologic:  	  ENMT:	    Respiratory and Thorax:  	  Cardiovascular:	    Gastrointestinal:	    Genitourinary:	    Musculoskeletal:	 left subcap fx     Neurological:	    Psychiatric:	    Hematology/Lymphatics:	    Endocrine:	    Allergic/Immunologic:	    ROS is otherwise negative.    PAST MEDICAL & SURGICAL HISTORY:  PAST MEDICAL & SURGICAL HISTORY:  Chronic CHF  Glaucoma  HTN (hypertension)  Breast cancer: s/p RT lumpectomy and radiation  Hypothyroid  S/P cataract extraction: bilateral  S/P lumpectomy, right breast      Allergies: codeine (Vomiting)  penicillin (Rash; Anaphylaxis)  Sulfacetamide Sodium (Rash)      Medications: acetaminophen   Tablet .. 325 milliGRAM(s) Oral every 4 hours PRN  ciprofloxacin   IVPB 400 milliGRAM(s) IV Intermittent once  ciprofloxacin   IVPB      enoxaparin Injectable 40 milliGRAM(s) SubCutaneous daily  hydrALAZINE 25 milliGRAM(s) Oral two times a day  levothyroxine 25 MICROGram(s) Oral daily  metroNIDAZOLE  IVPB 500 milliGRAM(s) IV Intermittent every 12 hours      FAMILY HISTORY:  Family history of brain cancer: Brother  Family history of uterine cancer: Sister - cervical/uterine  Family hx of lung cancer: 2 Brothers  : non-contributory    Social History:     Ambulation: Walking independently with walker                          9.8    5.2   )-----------( 180      ( 02 Jun 2019 04:16 )             30.7     06-02    142  |  106  |  32.0<H>  ----------------------------<  71  4.3   |  21.0<L>  |  2.00<H>    Ca    9.6      02 Jun 2019 04:16  Phos  3.4     06-02  Mg     2.9     06-02    TPro  7.5  /  Alb  3.8  /  TBili  0.5  /  DBili  x   /  AST  26  /  ALT  22  /  AlkPhos  133<H>  06-01      PHYSICAL EXAM:    Vital Signs Last 24 Hrs  T(C): 35.1 (02 Jun 2019 05:18), Max: 36.7 (02 Jun 2019 01:02)  T(F): 95.1 (02 Jun 2019 05:18), Max: 98 (02 Jun 2019 01:02)  HR: 74 (02 Jun 2019 05:18) (74 - 94)  BP: 112/56 (02 Jun 2019 05:18) (112/56 - 196/82)  BP(mean): --  RR: 18 (02 Jun 2019 05:18) (16 - 18)  SpO2: 94% (02 Jun 2019 05:18) (93% - 95%)  Daily     Daily     Appearance: Alert, responsive, in no acute distress.    Neurological: Sensation is grossly intact to light touch. 5/5 motor function of all extremities. No focal deficits or weaknesses found.    Skin: no rash on visible skin. Skin is clean, dry and intact. No bleeding. No abrasions. No ulcerations.    Vascular: 2+ distal pulses. Cap refill < 2 sec. No signs of venous insuffiency or stasis. No extremity ulcerations. No cyanosis.    Musculoskeletal:           Left Lower Extremity: No bony tenderness to hip or knee. Passive ROM to hip and knee intact without pain.  pulse intact, sensation intact, Dorsi/plantar flexion intact     Imaging Studies: CT of ABD/Pelvis reveal acute appearing Subcap fx     A/P:  Pt is a 89y Female who presents with a chief complaint of acute CHF exacerbation (02 Jun 2019 05:08)   found to have possible left subcap fx     PLAN:   * Xrays of hip and pelvis ordered  * Bed rest  *Further plan per attending
[facility]                                   [address] [place] [address]                                                      PHONE: [phone]                                                         FAX: [phone]  [email or web address]  ---------------------------------------------------------------------------------------------------------------------------------    Reason for Consult: CHF, SOB, BRUNO, CP    HPI:  LUIS MONET is an 89y Female h/o moderate AS, HTN, hypothyroidism, CKD, breast cancer s/p lumpectomy and radiation in 2007, and recent gallstone pancreatitis and acute on chronic renal disease in April 2019 presents c/o CP last night along with SOB/BRUNO. Compliant with meds. Currently feels much better. Comfortable in bed. Presented with uncontrolled HTN.    PAST MEDICAL & SURGICAL HISTORY:  Chronic CHF  Glaucoma  HTN (hypertension)  Breast cancer: s/p RT lumpectomy and radiation  Hypothyroid  S/P cataract extraction: bilateral  S/P lumpectomy, right breast      codeine (Vomiting)  penicillin (Rash; Anaphylaxis)  Sulfacetamide Sodium (Rash)      MEDICATIONS  (STANDING):  ciprofloxacin   IVPB      heparin  Injectable 5000 Unit(s) SubCutaneous every 8 hours  hydrALAZINE 25 milliGRAM(s) Oral two times a day  levothyroxine 25 MICROGram(s) Oral daily  metroNIDAZOLE  IVPB 500 milliGRAM(s) IV Intermittent every 12 hours    MEDICATIONS  (PRN):  acetaminophen   Tablet .. 325 milliGRAM(s) Oral every 4 hours PRN Mild Pain (1 - 3), Moderate Pain (4 - 6)      Social History:  Cigarettes:        no            Alchohol:      no           Illicit Drug Abuse:  no    ROS: Negative other than as mentioned in HPI.    Vital Signs Last 24 Hrs  T(C): 34.7 (02 Jun 2019 06:15), Max: 36.7 (02 Jun 2019 01:02)  T(F): 94.4 (02 Jun 2019 06:15), Max: 98 (02 Jun 2019 01:02)  HR: 74 (02 Jun 2019 05:18) (74 - 94)  BP: 112/56 (02 Jun 2019 05:18) (112/56 - 196/82)  BP(mean): --  RR: 18 (02 Jun 2019 05:18) (16 - 18)  SpO2: 94% (02 Jun 2019 05:18) (93% - 95%)  ICU Vital Signs Last 24 Hrs  LUIS MONET  I&O's Detail    01 Jun 2019 07:01  -  02 Jun 2019 07:00  --------------------------------------------------------  IN:    Oral Fluid: 120 mL  Total IN: 120 mL    OUT:    Voided: 300 mL  Total OUT: 300 mL    Total NET: -180 mL        I&O's Summary    01 Jun 2019 07:01  -  02 Jun 2019 07:00  --------------------------------------------------------  IN: 120 mL / OUT: 300 mL / NET: -180 mL      Drug Dosing Weight  LUIS CHIKI      PHYSICAL EXAM:  General: NAD.  HEENT: Head; normocephalic.  Eyes: Pupils reactive.  Neck: Supple.  CARDIOVASCULAR: RR.   LUNGS: Mild decreased breath sounds bilaterally.  ABDOMEN: Soft, nontender.  EXTREMITIES: No clubbing, cyanosis or edema.   SKIN: warm.  NEURO: Alert/awake.    PSYCH: normal affect.        LABS:                        9.8    5.2   )-----------( 180      ( 02 Jun 2019 04:16 )             30.7     06-02    142  |  106  |  32.0<H>  ----------------------------<  71  4.3   |  21.0<L>  |  2.00<H>    Ca    9.6      02 Jun 2019 04:16  Phos  3.4     06-02  Mg     2.9     06-02    TPro  7.5  /  Alb  3.8  /  TBili  0.5  /  DBili  x   /  AST  26  /  ALT  22  /  AlkPhos  133<H>  06-01    LUIS MONET  CARDIAC MARKERS ( 02 Jun 2019 04:16 )  x     / 0.03 ng/mL / 51 U/L / x     / x      CARDIAC MARKERS ( 01 Jun 2019 20:09 )  x     / 0.02 ng/mL / x     / x     / x          PT/INR - ( 01 Jun 2019 20:09 )   PT: 9.9 sec;   INR: 0.86 ratio         PTT - ( 01 Jun 2019 20:09 )  PTT:44.1 sec      RADIOLOGY & ADDITIONAL STUDIES:    INTERPRETATION OF TELEMETRY (personally reviewed): NSR    ECG: NSR, first degree AVB, PRWP, no change from April 2019    ECHO:  1. Technically adequate study.   2. Normal left ventricular internal cavity size.   3. Hyperdynamic global left ventricular systolic function.   4. Left ventricular ejection fraction, by visual estimation, is 70 to   75%.   5. Spectral Doppler shows impaired relaxation pattern of left   ventricular myocardial filling (Grade I diastolic dysfunction).   6. Normal right ventricular size and function.   7. Mild aortic valve stenosis.   8. Mitral valve mean gradient is 3.0 mmHg consistent with mild mitral   stenosis.   9. Moderate mitral annular calcification.  10. The right atrium is normal in size.  11. There is no evidence of pericardial effusion.    MD Mauro Electronically signed on 4/8/2019 at 3:46:28 PM      Assessment and Plan:  89y Female h/o moderate AS, HTN, hypothyroidism, CKD, breast cancer s/p lumpectomy and radiation in 2007, and recent gallstone pancreatitis and acute on chronic renal disease in April 2019 presents c/o CP last night along with SOB/BRUNO. Compliant with meds. Currently feels much better. Comfortable in bed. Presented with uncontrolled HTN    1. Admit to telemetry.  2. Acute on chronic diastolic HF possibly secondary to uncontrolled HTN. Pt given lasix. Trial of diuresis. Monitor urine output and renal function.  3. Trend CE's to r/o active ischemic process.  4. Prior recent 2D echo noted. Only mild AS on report. EF 70%.  5. BP now controlled. Presented with uncontrolled HTN. Continue medical therapy.  6. Renal consultation.  7. Discussed with ER physician.

## 2019-06-03 NOTE — PROGRESS NOTE ADULT - SUBJECTIVE AND OBJECTIVE BOX
Patient seen and examined    I&O's Summary    02 Jun 2019 07:01  -  03 Jun 2019 07:00  --------------------------------------------------------  IN: 200 mL / OUT: 300 mL / NET: -100 mL    03 Jun 2019 07:01  -  03 Jun 2019 15:52  --------------------------------------------------------  IN: 400 mL / OUT: 850 mL / NET: -450 mL    tired, felt fuzzy with meds  Fqmily present    REVIEW OF SYSTEMS:    CONSTITUTIONAL: No F/C  RESPIRATORY: No cough,  No SOB  CARDIOVASCULAR: No CP/palpitations,    GASTROINTESTINAL: No abdominal pain  or NVD   GENITOURINARY: No UTI sx  NEUROLOGICAL: No headaches, NO wk/numbness  MUSCULOSKELETAL:   EXTREMITIES : no swelling,    Vital Signs Last 24 Hrs  T(C): 36.4 (03 Jun 2019 09:46), Max: 36.4 (02 Jun 2019 17:37)  T(F): 97.6 (03 Jun 2019 09:46), Max: 97.6 (02 Jun 2019 17:37)  HR: 75 (03 Jun 2019 09:46) (73 - 85)  BP: 116/61 (03 Jun 2019 09:46) (116/61 - 192/80)  BP(mean): --  RR: 18 (03 Jun 2019 09:46) (16 - 20)  SpO2: 98% (03 Jun 2019 09:46) (94% - 98%)    PHYSICAL EXAM:    GENERAL: NAD,   EYES:  conjunctiva and sclera clear  NECK: Supple, No JVD/Bruit  NERVOUS SYSTEM:  A/O x3, able to reply easily, moving all extr  CHEST:  No rales or rhonchi  HEART:  RRR, No murmur  ABDOMEN: Soft, NT/ND BS+  EXTREMITIES:  No Edema;  SKIN: No rashes    LABS:                          9.7    4.5   )-----------( 185      ( 03 Jun 2019 06:18 )             30.0     06-03    141  |  103  |  37.0<H>  ----------------------------<  113  4.0   |  22.0  |  2.57<H>    Ca    9.5      03 Jun 2019 06:18  Phos  4.3     06-03  Mg     2.5     06-03    TPro  7.5  /  Alb  3.8  /  TBili  0.5  /  DBili  x   /  AST  26  /  ALT  22  /  AlkPhos  133<H>  06-01      MEDICATIONS  (STANDING):  acetaminophen   Tablet .. PRN  aspirin  chewable  ciprofloxacin   IVPB  ciprofloxacin   IVPB  dextrose 40% Gel PRN  dextrose 5%.  dextrose 50% Injectable  dextrose 50% Injectable  dextrose 50% Injectable  glucagon  Injectable PRN  heparin  Injectable  hydrALAZINE  hydrALAZINE Injectable PRN  levothyroxine  metroNIDAZOLE  IVPB  pantoprazole  Injectable

## 2019-06-03 NOTE — PROGRESS NOTE ADULT - SUBJECTIVE AND OBJECTIVE BOX
CC: Patient is a 89y old  Female who presents with a chief complaint of acute CHF exacerbation (01 Jun 2019 23:50) improving sob     Overnight/ AM events:   Patient seen and examined at bedside, She continues to report chest pressure, diffusely across upper chest, tender to palpation which is improved now. Pain is improved with Tylenol. She also reports improvement to SOB. Speaking in full sentences, normal work of breathing.     Patient not ambulating, voiding and last BM before admission  Not on telemonitor    ROS: Denies fever, abdominal pain, diarrhea, constipation, calf pain.    VS:   Vital Signs Last 24 Hrs  T(C): 36.4 (03 Jun 2019 09:46), Max: 36.7 (02 Jun 2019 15:14)  T(F): 97.6 (03 Jun 2019 09:46), Max: 98 (02 Jun 2019 15:14)  HR: 75 (03 Jun 2019 09:46) (73 - 85)  BP: 116/61 (03 Jun 2019 09:46) (116/61 - 192/80)  RR: 18 (03 Jun 2019 09:46) (16 - 20)  SpO2: 98% (03 Jun 2019 09:46) (94% - 98%) on room air      Physical Exam:   Gen: WD WN NAD  HEENT: NCAT, EOMI, PERRLA  CVS: RRR, +S1/S2, no murmurs, rubs or gallops appreciated, chest wall tenderness noted across upper chest  Lungs: CTAB, no wheeze, rales, rhonchi  Abdomen: +BS, soft, ND, NT. No palpable flank tenderness or mass, no CVA tenderness  Ext: No cyanosis, edema, no b/l hip or calf tenderness  Neuro: AAOx3, no focal deficits.    Labs:                          9.7    4.5   )-----------( 185      ( 03 Jun 2019 06:18 )             30.0       06-03    141  |  103  |  37.0<H>  ----------------------------<  113  4.0   |  22.0  |  2.57<H>    Ca    9.5      03 Jun 2019 06:18  Phos  4.3     06-03  Mg     2.5     06-03    TPro  7.5  /  Alb  3.8  /  TBili  0.5  /  DBili  x   /  AST  26  /  ALT  22  /  AlkPhos  133<H>  06-01        Radiology:  < from: CT Abdomen and Pelvis No Cont (06.02.19 @ 03:24) >  IMPRESSION:   1. Mild acute diverticulitis of the proximal sigmoid colon.   2. Moderate bilateral pleural effusions with associated compressive   atelectasis.   3. Acute appearing nondisplaced subcapital left femoral neck fracture.    ******PRELIMINARY REPORT******      < end of copied text >      MEDICATIONS  (STANDING):  aspirin  chewable 81 milliGRAM(s) Oral daily  ciprofloxacin   IVPB 400 milliGRAM(s) IV Intermittent every 24 hours  ciprofloxacin   IVPB      dextrose 5%. 1000 milliLiter(s) (50 mL/Hr) IV Continuous <Continuous>  dextrose 50% Injectable 12.5 Gram(s) IV Push once  dextrose 50% Injectable 25 Gram(s) IV Push once  dextrose 50% Injectable 25 Gram(s) IV Push once  heparin  Injectable 5000 Unit(s) SubCutaneous every 8 hours  hydrALAZINE 25 milliGRAM(s) Oral two times a day  levothyroxine 25 MICROGram(s) Oral daily  metroNIDAZOLE  IVPB 500 milliGRAM(s) IV Intermittent every 12 hours  pantoprazole  Injectable 40 milliGRAM(s) IV Push daily    MEDICATIONS  (PRN):  acetaminophen   Tablet .. 325 milliGRAM(s) Oral every 4 hours PRN Mild Pain (1 - 3), Moderate Pain (4 - 6)  dextrose 40% Gel 15 Gram(s) Oral once PRN Blood Glucose LESS THAN 70 milliGRAM(s)/deciLiter  glucagon  Injectable 1 milliGRAM(s) IntraMuscular once PRN Glucose <70 milliGRAM(s)/deciLiter  hydrALAZINE Injectable 5 milliGRAM(s) IV Push every 4 hours PRN for SBP > 170 or DBPO > 100 CC: Patient is a 89y old  Female who presents with a chief complaint of acute CHF exacerbation (01 Jun 2019 23:50) improving sob     Overnight/ AM events:   Patient seen and examined at bedside, She continues to report chest pressure this am, diffusely across upper chest, tender to palpation which is improved now. Patient received IV antihypertensives this am. Pain is improved now. She also reports improvement to SOB. Speaking in full sentences, normal work of breathing.     Patient not ambulating, voiding and last BM before admission  Not on telemonitor    ROS: Denies fever, abdominal pain, diarrhea, constipation, calf pain.    VS:   Vital Signs Last 24 Hrs  T(C): 36.4 (03 Jun 2019 09:46), Max: 36.7 (02 Jun 2019 15:14)  T(F): 97.6 (03 Jun 2019 09:46), Max: 98 (02 Jun 2019 15:14)  HR: 75 (03 Jun 2019 09:46) (73 - 85)  BP: 116/61 (03 Jun 2019 09:46) (116/61 - 192/80)  RR: 18 (03 Jun 2019 09:46) (16 - 20)  SpO2: 98% (03 Jun 2019 09:46) (94% - 98%) on room air      Physical Exam:   Gen: WD WN NAD  HEENT: NCAT, EOMI, PERRLA  CVS: RRR, +S1/S2, no murmurs, rubs or gallops appreciated, chest wall tenderness noted across upper chest  Lungs: CTAB, no wheeze, rales, rhonchi  Abdomen: +BS, soft, ND, NT. No palpable flank tenderness or mass, no CVA tenderness  Ext: No cyanosis, edema, no b/l hip or calf tenderness  Neuro: AAOx3, no focal deficits.    Labs:                          9.7    4.5   )-----------( 185      ( 03 Jun 2019 06:18 )             30.0       06-03    141  |  103  |  37.0<H>  ----------------------------<  113  4.0   |  22.0  |  2.57<H>    Ca    9.5      03 Jun 2019 06:18  Phos  4.3     06-03  Mg     2.5     06-03    TPro  7.5  /  Alb  3.8  /  TBili  0.5  /  DBili  x   /  AST  26  /  ALT  22  /  AlkPhos  133<H>  06-01        Radiology:  < from: CT Abdomen and Pelvis No Cont (06.02.19 @ 03:24) >  IMPRESSION:   1. Mild acute diverticulitis of the proximal sigmoid colon.   2. Moderate bilateral pleural effusions with associated compressive   atelectasis.   3. Acute appearing nondisplaced subcapital left femoral neck fracture.    ******PRELIMINARY REPORT******      < end of copied text >      MEDICATIONS  (STANDING):  aspirin  chewable 81 milliGRAM(s) Oral daily  ciprofloxacin   IVPB 400 milliGRAM(s) IV Intermittent every 24 hours  ciprofloxacin   IVPB      dextrose 5%. 1000 milliLiter(s) (50 mL/Hr) IV Continuous <Continuous>  dextrose 50% Injectable 12.5 Gram(s) IV Push once  dextrose 50% Injectable 25 Gram(s) IV Push once  dextrose 50% Injectable 25 Gram(s) IV Push once  heparin  Injectable 5000 Unit(s) SubCutaneous every 8 hours  hydrALAZINE 25 milliGRAM(s) Oral two times a day  levothyroxine 25 MICROGram(s) Oral daily  metroNIDAZOLE  IVPB 500 milliGRAM(s) IV Intermittent every 12 hours  pantoprazole  Injectable 40 milliGRAM(s) IV Push daily    MEDICATIONS  (PRN):  acetaminophen   Tablet .. 325 milliGRAM(s) Oral every 4 hours PRN Mild Pain (1 - 3), Moderate Pain (4 - 6)  dextrose 40% Gel 15 Gram(s) Oral once PRN Blood Glucose LESS THAN 70 milliGRAM(s)/deciLiter  glucagon  Injectable 1 milliGRAM(s) IntraMuscular once PRN Glucose <70 milliGRAM(s)/deciLiter  hydrALAZINE Injectable 5 milliGRAM(s) IV Push every 4 hours PRN for SBP > 170 or DBPO > 100 CC: Patient is a 89y old  Female who presents with a chief complaint of acute CHF exacerbation (01 Jun 2019 23:50) improving sob     Overnight/ AM events:   Patient seen and examined at bedside, She continues to report chest pressure this am, diffusely across upper chest, tender to palpation which is improved now. Patient received IV antihypertensives this am. Pain is improved now. She also reports improvement to SOB. Speaking in full sentences, normal work of breathing.     Patient not ambulating, voiding and last BM before admission  Not on telemonitor    ROS: Denies fever, abdominal pain, diarrhea, constipation, calf pain.    VS:   Vital Signs Last 24 Hrs  T(C): 36.4 (03 Jun 2019 09:46), Max: 36.7 (02 Jun 2019 15:14)  T(F): 97.6 (03 Jun 2019 09:46), Max: 98 (02 Jun 2019 15:14)  HR: 75 (03 Jun 2019 09:46) (73 - 85)  BP: 116/61 (03 Jun 2019 09:46) (116/61 - 192/80)  RR: 18 (03 Jun 2019 09:46) (16 - 20)  SpO2: 98% (03 Jun 2019 09:46) (94% - 98%) on room air      Physical Exam:   Gen: WD WN NAD  HEENT: NCAT, EOMI, PERRLA  CVS: RRR, +S1/S2, + GIII/VI holosystolic murmur, no rubs or gallops appreciated, chest wall tenderness noted across upper chest  Lungs: CTAB, no wheeze, rales, rhonchi  Abdomen: +BS, soft, ND, NT. No palpable flank tenderness or mass, no CVA tenderness  Ext: No cyanosis, edema, no b/l hip or calf tenderness  Neuro: AAOx3, no focal deficits.    Labs:                          9.7    4.5   )-----------( 185      ( 03 Jun 2019 06:18 )             30.0       06-03    141  |  103  |  37.0<H>  ----------------------------<  113  4.0   |  22.0  |  2.57<H>    Ca    9.5      03 Jun 2019 06:18  Phos  4.3     06-03  Mg     2.5     06-03    TPro  7.5  /  Alb  3.8  /  TBili  0.5  /  DBili  x   /  AST  26  /  ALT  22  /  AlkPhos  133<H>  06-01        Radiology:  < from: CT Abdomen and Pelvis No Cont (06.02.19 @ 03:24) >  IMPRESSION:   1. Mild acute diverticulitis of the proximal sigmoid colon.   2. Moderate bilateral pleural effusions with associated compressive   atelectasis.   3. Acute appearing nondisplaced subcapital left femoral neck fracture.    ******PRELIMINARY REPORT******      < end of copied text >      MEDICATIONS  (STANDING):  aspirin  chewable 81 milliGRAM(s) Oral daily  ciprofloxacin   IVPB 400 milliGRAM(s) IV Intermittent every 24 hours  ciprofloxacin   IVPB      dextrose 5%. 1000 milliLiter(s) (50 mL/Hr) IV Continuous <Continuous>  dextrose 50% Injectable 12.5 Gram(s) IV Push once  dextrose 50% Injectable 25 Gram(s) IV Push once  dextrose 50% Injectable 25 Gram(s) IV Push once  heparin  Injectable 5000 Unit(s) SubCutaneous every 8 hours  hydrALAZINE 25 milliGRAM(s) Oral two times a day  levothyroxine 25 MICROGram(s) Oral daily  metroNIDAZOLE  IVPB 500 milliGRAM(s) IV Intermittent every 12 hours  pantoprazole  Injectable 40 milliGRAM(s) IV Push daily    MEDICATIONS  (PRN):  acetaminophen   Tablet .. 325 milliGRAM(s) Oral every 4 hours PRN Mild Pain (1 - 3), Moderate Pain (4 - 6)  dextrose 40% Gel 15 Gram(s) Oral once PRN Blood Glucose LESS THAN 70 milliGRAM(s)/deciLiter  glucagon  Injectable 1 milliGRAM(s) IntraMuscular once PRN Glucose <70 milliGRAM(s)/deciLiter  hydrALAZINE Injectable 5 milliGRAM(s) IV Push every 4 hours PRN for SBP > 170 or DBPO > 100

## 2019-06-04 LAB
ANION GAP SERPL CALC-SCNC: 12 MMOL/L — SIGNIFICANT CHANGE UP (ref 5–17)
ANION GAP SERPL CALC-SCNC: 15 MMOL/L — SIGNIFICANT CHANGE UP (ref 5–17)
APPEARANCE UR: CLEAR — SIGNIFICANT CHANGE UP
BILIRUB UR-MCNC: NEGATIVE — SIGNIFICANT CHANGE UP
BUN SERPL-MCNC: 43 MG/DL — HIGH (ref 8–20)
BUN SERPL-MCNC: 44 MG/DL — HIGH (ref 8–20)
CALCIUM SERPL-MCNC: 9.1 MG/DL — SIGNIFICANT CHANGE UP (ref 8.6–10.2)
CALCIUM SERPL-MCNC: 9.2 MG/DL — SIGNIFICANT CHANGE UP (ref 8.6–10.2)
CHLORIDE SERPL-SCNC: 102 MMOL/L — SIGNIFICANT CHANGE UP (ref 98–107)
CHLORIDE SERPL-SCNC: 105 MMOL/L — SIGNIFICANT CHANGE UP (ref 98–107)
CO2 SERPL-SCNC: 22 MMOL/L — SIGNIFICANT CHANGE UP (ref 22–29)
CO2 SERPL-SCNC: 25 MMOL/L — SIGNIFICANT CHANGE UP (ref 22–29)
COLOR SPEC: YELLOW — SIGNIFICANT CHANGE UP
CREAT SERPL-MCNC: 3.05 MG/DL — HIGH (ref 0.5–1.3)
CREAT SERPL-MCNC: 3.16 MG/DL — HIGH (ref 0.5–1.3)
DIFF PNL FLD: NEGATIVE — SIGNIFICANT CHANGE UP
EOSINOPHIL NFR URNS MANUAL: SIGNIFICANT CHANGE UP
GLUCOSE BLDC GLUCOMTR-MCNC: 115 MG/DL — HIGH (ref 70–99)
GLUCOSE SERPL-MCNC: 125 MG/DL — HIGH (ref 70–115)
GLUCOSE SERPL-MCNC: 69 MG/DL — LOW (ref 70–115)
GLUCOSE UR QL: NEGATIVE MG/DL — SIGNIFICANT CHANGE UP
KETONES UR-MCNC: NEGATIVE — SIGNIFICANT CHANGE UP
LEUKOCYTE ESTERASE UR-ACNC: NEGATIVE — SIGNIFICANT CHANGE UP
MAGNESIUM SERPL-MCNC: 2.5 MG/DL — SIGNIFICANT CHANGE UP (ref 1.6–2.6)
MAGNESIUM SERPL-MCNC: 2.5 MG/DL — SIGNIFICANT CHANGE UP (ref 1.6–2.6)
NITRITE UR-MCNC: NEGATIVE — SIGNIFICANT CHANGE UP
PH UR: 7 — SIGNIFICANT CHANGE UP (ref 5–8)
PHOSPHATE SERPL-MCNC: 4.9 MG/DL — HIGH (ref 2.4–4.7)
PHOSPHATE SERPL-MCNC: 5.4 MG/DL — HIGH (ref 2.4–4.7)
POTASSIUM SERPL-MCNC: 3.7 MMOL/L — SIGNIFICANT CHANGE UP (ref 3.5–5.3)
POTASSIUM SERPL-MCNC: 4.6 MMOL/L — SIGNIFICANT CHANGE UP (ref 3.5–5.3)
POTASSIUM SERPL-SCNC: 3.7 MMOL/L — SIGNIFICANT CHANGE UP (ref 3.5–5.3)
POTASSIUM SERPL-SCNC: 4.6 MMOL/L — SIGNIFICANT CHANGE UP (ref 3.5–5.3)
PROT UR-MCNC: 100 MG/DL
SODIUM SERPL-SCNC: 139 MMOL/L — SIGNIFICANT CHANGE UP (ref 135–145)
SODIUM SERPL-SCNC: 142 MMOL/L — SIGNIFICANT CHANGE UP (ref 135–145)
SP GR SPEC: 1.01 — SIGNIFICANT CHANGE UP (ref 1.01–1.02)
UROBILINOGEN FLD QL: NEGATIVE MG/DL — SIGNIFICANT CHANGE UP

## 2019-06-04 PROCEDURE — 71045 X-RAY EXAM CHEST 1 VIEW: CPT | Mod: 26

## 2019-06-04 PROCEDURE — 99232 SBSQ HOSP IP/OBS MODERATE 35: CPT | Mod: GC

## 2019-06-04 PROCEDURE — 93010 ELECTROCARDIOGRAM REPORT: CPT

## 2019-06-04 RX ORDER — SACCHAROMYCES BOULARDII 250 MG
250 POWDER IN PACKET (EA) ORAL
Refills: 0 | Status: DISCONTINUED | OUTPATIENT
Start: 2019-06-04 | End: 2019-06-04

## 2019-06-04 RX ORDER — DOCUSATE SODIUM 100 MG
100 CAPSULE ORAL THREE TIMES A DAY
Refills: 0 | Status: DISCONTINUED | OUTPATIENT
Start: 2019-06-04 | End: 2019-06-07

## 2019-06-04 RX ORDER — SENNA PLUS 8.6 MG/1
2 TABLET ORAL AT BEDTIME
Refills: 0 | Status: DISCONTINUED | OUTPATIENT
Start: 2019-06-04 | End: 2019-06-07

## 2019-06-04 RX ORDER — FUROSEMIDE 40 MG
10 TABLET ORAL DAILY
Refills: 0 | Status: DISCONTINUED | OUTPATIENT
Start: 2019-06-04 | End: 2019-06-04

## 2019-06-04 RX ORDER — FUROSEMIDE 40 MG
20 TABLET ORAL DAILY
Refills: 0 | Status: DISCONTINUED | OUTPATIENT
Start: 2019-06-04 | End: 2019-06-07

## 2019-06-04 RX ORDER — POLYETHYLENE GLYCOL 3350 17 G/17G
17 POWDER, FOR SOLUTION ORAL DAILY
Refills: 0 | Status: DISCONTINUED | OUTPATIENT
Start: 2019-06-04 | End: 2019-06-07

## 2019-06-04 RX ADMIN — Medication 200 MILLIGRAM(S): at 05:34

## 2019-06-04 RX ADMIN — Medication 81 MILLIGRAM(S): at 11:09

## 2019-06-04 RX ADMIN — Medication 25 MILLIGRAM(S): at 08:53

## 2019-06-04 RX ADMIN — HEPARIN SODIUM 5000 UNIT(S): 5000 INJECTION INTRAVENOUS; SUBCUTANEOUS at 22:32

## 2019-06-04 RX ADMIN — POLYETHYLENE GLYCOL 3350 17 GRAM(S): 17 POWDER, FOR SOLUTION ORAL at 15:21

## 2019-06-04 RX ADMIN — Medication 20 MILLIGRAM(S): at 18:29

## 2019-06-04 RX ADMIN — Medication 100 MILLIGRAM(S): at 17:12

## 2019-06-04 RX ADMIN — Medication 100 MILLIGRAM(S): at 18:27

## 2019-06-04 RX ADMIN — Medication 100 MILLIGRAM(S): at 05:33

## 2019-06-04 RX ADMIN — Medication 25 MICROGRAM(S): at 05:34

## 2019-06-04 RX ADMIN — Medication 25 MILLIGRAM(S): at 17:11

## 2019-06-04 NOTE — PHYSICAL THERAPY INITIAL EVALUATION ADULT - GAIT DISTANCE, PT EVAL
100 feet/PT also witnessed pt ambulating in room with RN and no assist device, pt unsteady required Min A to maintain safety. RW recommended for home.

## 2019-06-04 NOTE — PROGRESS NOTE ADULT - ASSESSMENT
CKD /NAVIN - creat increased from 2-> 2.57 to   Cause unclear; on Cipro and also Protonix which can increase creatinine; Now Cipro dose adjusted   watch on present meds, may need to change if creat keeps rising  labs am

## 2019-06-04 NOTE — PROGRESS NOTE ADULT - SUBJECTIVE AND OBJECTIVE BOX
CC: Patient is a 89y old  Female who presents with a chief complaint of acute CHF exacerbation (01 Jun 2019 23:50) improving sob     Overnight/ AM events:   Patient seen and examined at bedside, She days that "I feel fine today" Speaking in full sentences, normal work of breathing.     Patient not ambulating, voiding and last BM before admission  Not on telemonitor    ROS: Denies fever, abdominal pain, diarrhea, constipation, calf pain., SOB, Chest pain    VS:   Vital Signs Last 24 Hrs  T(C): 36.3 (04 Jun 2019 05:32), Max: 36.5 (03 Jun 2019 21:39)  T(F): 97.4 (04 Jun 2019 05:32), Max: 97.7 (03 Jun 2019 21:39)  HR: 73 (04 Jun 2019 05:32) (73 - 84)  BP: 114/63 (04 Jun 2019 05:32) (114/63 - 178/62)  RR: 16 (04 Jun 2019 05:32) (16 - 18)  SpO2: 96% (04 Jun 2019 05:32) (94% - 98%) on room air    Telemonitor: Sinus nicole to low 40's    Physical Exam:   Gen: WD WN NAD  HEENT: NCAT, EOMI, PERRLA  CVS: RRR, +S1/S2, + GIII/VI holosystolic murmur, no rubs or gallops appreciated, chest wall tenderness noted across upper chest  Lungs: CTAB, no wheeze, rales, rhonchi  Abdomen: +BS, soft, ND, NT. No palpable flank tenderness or mass, no CVA tenderness  Ext: No cyanosis, edema, no b/l hip or calf tenderness  Neuro: AAOx3, no focal deficits.    Labs:      06-04    142  |  105  |  44.0<H>  ----------------------------<  69<L>  4.6   |  25.0  |  3.16<H>    Ca    9.1      04 Jun 2019 06:15  Phos  5.4     06-04  Mg     2.5     06-04                            9.7    4.5   )-----------( 185      ( 03 Jun 2019 06:18 )             30.0       Radiology:  < from: CT Abdomen and Pelvis No Cont (06.02.19 @ 03:24) >  IMPRESSION:   1. Mild acute diverticulitis of the proximal sigmoid colon.   2. Moderate bilateral pleural effusions with associated compressive   atelectasis.   3. Acute appearing nondisplaced subcapital left femoral neck fracture.    ******PRELIMINARY REPORT******      < end of copied text >    MEDICATIONS  (STANDING):  aspirin  chewable 81 milliGRAM(s) Oral daily  ciprofloxacin   IVPB 400 milliGRAM(s) IV Intermittent every 24 hours  ciprofloxacin   IVPB      dextrose 5%. 1000 milliLiter(s) (50 mL/Hr) IV Continuous <Continuous>  dextrose 50% Injectable 12.5 Gram(s) IV Push once  dextrose 50% Injectable 25 Gram(s) IV Push once  dextrose 50% Injectable 25 Gram(s) IV Push once  furosemide    Tablet 20 milliGRAM(s) Oral daily  heparin  Injectable 5000 Unit(s) SubCutaneous every 8 hours  hydrALAZINE 25 milliGRAM(s) Oral two times a day  levothyroxine 25 MICROGram(s) Oral daily  metroNIDAZOLE  IVPB 500 milliGRAM(s) IV Intermittent every 12 hours    MEDICATIONS  (PRN):  acetaminophen   Tablet .. 325 milliGRAM(s) Oral every 4 hours PRN Mild Pain (1 - 3), Moderate Pain (4 - 6)  dextrose 40% Gel 15 Gram(s) Oral once PRN Blood Glucose LESS THAN 70 milliGRAM(s)/deciLiter  glucagon  Injectable 1 milliGRAM(s) IntraMuscular once PRN Glucose <70 milliGRAM(s)/deciLiter  hydrALAZINE Injectable 5 milliGRAM(s) IV Push every 4 hours PRN for SBP > 170 or DBPO > 100

## 2019-06-04 NOTE — PHYSICAL THERAPY INITIAL EVALUATION ADULT - PERTINENT HX OF CURRENT PROBLEM, REHAB EVAL
89 yo F h/o Chronic diastolic CHFEF70%, mild AS, compression fx [id number], hypothyroid, breast cancer s/p lumpectomy and radiation in 2007, gallstone pancreatitis in April 2019 p/w c/o chest pressure and SOB. Admitted for CHF exacerbation. Also incidentally found to have acute sigmoid diverticulitis, Hospital course further complicated by noted left femoral neck fracture, initially ortho consulted and recommended MRI but patient is refusing. Fx to be treated as chronic, pt is WBAT

## 2019-06-04 NOTE — PROGRESS NOTE ADULT - SUBJECTIVE AND OBJECTIVE BOX
[facility]                                   [address] [place] [address]                                                      PHONE: [phone]                                                         FAX: [phone]  [email or web address]  ---------------------------------------------------------------------------------------------------------------------------------    Overnight events/patient complaints: no dyspnea, lying flat, tele c/w Mobitz Type AV block with occ nicole, no prolonged pauses      codeine (Vomiting)  penicillin (Rash; Anaphylaxis)  Sulfacetamide Sodium (Rash)    MEDICATIONS  (STANDING):  aspirin  chewable 81 milliGRAM(s) Oral daily  ciprofloxacin   IVPB 400 milliGRAM(s) IV Intermittent every 24 hours  ciprofloxacin   IVPB      dextrose 5%. 1000 milliLiter(s) (50 mL/Hr) IV Continuous <Continuous>  dextrose 50% Injectable 12.5 Gram(s) IV Push once  dextrose 50% Injectable 25 Gram(s) IV Push once  dextrose 50% Injectable 25 Gram(s) IV Push once  furosemide    Tablet 20 milliGRAM(s) Oral daily  heparin  Injectable 5000 Unit(s) SubCutaneous every 8 hours  hydrALAZINE 25 milliGRAM(s) Oral two times a day  levothyroxine 25 MICROGram(s) Oral daily  metroNIDAZOLE  IVPB 500 milliGRAM(s) IV Intermittent every 12 hours    MEDICATIONS  (PRN):  acetaminophen   Tablet .. 325 milliGRAM(s) Oral every 4 hours PRN Mild Pain (1 - 3), Moderate Pain (4 - 6)  dextrose 40% Gel 15 Gram(s) Oral once PRN Blood Glucose LESS THAN 70 milliGRAM(s)/deciLiter  glucagon  Injectable 1 milliGRAM(s) IntraMuscular once PRN Glucose <70 milliGRAM(s)/deciLiter  hydrALAZINE Injectable 5 milliGRAM(s) IV Push every 4 hours PRN for SBP > 170 or DBPO > 100      Vital Signs Last 24 Hrs  T(C): 36.3 (2019 05:32), Max: 36.5 (2019 21:39)  T(F): 97.4 (2019 05:32), Max: 97.7 (2019 21:39)  HR: 73 (2019 05:32) (73 - 84)  BP: 114/63 (2019 05:32) (114/63 - 178/62)  BP(mean): --  RR: 16 (2019 05:32) (16 - 18)  SpO2: 96% (2019 05:32) (94% - 98%)  Daily     Daily Weight in k.6 (2019 05:15)  ICU Vital Signs Last 24 Hrs  LUIS MONET  I&O's Detail    2019 07:01  -  2019 07:00  --------------------------------------------------------  IN:    Oral Fluid: 400 mL  Total IN: 400 mL    OUT:    Voided: 850 mL  Total OUT: 850 mL    Total NET: -450 mL        I&O's Summary    2019 07:01  -  2019 07:00  --------------------------------------------------------  IN: 400 mL / OUT: 850 mL / NET: -450 mL      Drug Dosing Weight  LUIS MONET      PHYSICAL EXAM:  General: Appears well developed, well nourished alert and cooperative.  HEENT: Head; normocephalic, atraumatic.  Eyes: Pupils reactive, cornea wnl.  Neck: Supple, no nodes adenopathy, no NVD or carotid bruit or thyromegaly.  CARDIOVASCULAR: Normal S1 and S2, No murmur, rub, gallop or lift.   LUNGS: No rales, rhonchi or wheeze. Normal breath sounds bilaterally.  ABDOMEN: Soft, nontender without mass or organomegaly. bowel sounds normoactive.  EXTREMITIES: No clubbing, cyanosis or edema. Distal pulses wnl.   SKIN: warm and dry with normal turgor.  NEURO: Alert/oriented x 3/normal motor exam. No pathologic reflexes.    PSYCH: normal affect.        LABS:                        9.7    4.5   )-----------( 185      ( 2019 06:18 )             30.0     06-04    142  |  105  |  44.0<H>  ----------------------------<  69<L>  4.6   |  25.0  |  3.16<H>    Ca    9.1      2019 06:15  Phos  5.4     06-04  Mg     2.5     06-04      LUIS MONET  CARDIAC MARKERS ( 2019 06:18 )  x     / 0.04 ng/mL / 57 U/L / x     / x      CARDIAC MARKERS ( 2019 12:57 )  x     / 0.04 ng/mL / 49 U/L / x     / x                RADIOLOGY & ADDITIONAL STUDIES:    INTERPRETATION OF TELEMETRY (personally reviewed):    ECG:< from: 12 Lead ECG (19 @ 11:00) >  Diagnosis Line Sinus rhythm with 1st degree A-V block with  Moderate voltage criteria for LVH, may be normal variant  Nonspecific T wave abnormality        Confirmed by MILTON PARKER ([id number]) on 6/3/2019 4:59:46 PM    < end of copied text >      ECHO:< from: TTE Echo Complete w/Doppler (19 @ 12:33) >  Summary:   1. Technically adequate study.   2. Normal left ventricular internal cavity size.   3.Hyperdynamic global left ventricular systolic function.   4. Left ventricular ejection fraction, by visual estimation, is 70 to   75%.   5. Spectral Doppler shows impaired relaxation pattern of left   ventricular myocardial filling (Grade I diastolicdysfunction).   6. Normal right ventricular size and function.   7. Mild aortic valve stenosis.   8. Mitral valve mean gradient is 3.0 mmHg consistent with mild mitral   stenosis.   9. Moderate mitral annular calcification.  10. The right atrium is normal in size.  11. There is no evidence of pericardial effusion.    MD Mauro Electronically signed on 2019 at 3:46:28 PM              < end of copied text >

## 2019-06-04 NOTE — PROGRESS NOTE ADULT - SUBJECTIVE AND OBJECTIVE BOX
Patient seen and examined    Feels better cf yesterday  no c/o CP SOB NV Less confused  Ate poorly but willing to try ice cream  No swelling feet    Vital Signs Last 24 Hrs  T(C): 36.4 (04 Jun 2019 16:21), Max: 36.5 (03 Jun 2019 21:39)  T(F): 97.5 (04 Jun 2019 16:21), Max: 97.7 (03 Jun 2019 21:39)  HR: 77 (04 Jun 2019 18:28) (65 - 77)  BP: 153/59 (04 Jun 2019 18:28) (114/63 - 168/56)  BP(mean): --  RR: 16 (04 Jun 2019 16:21) (16 - 16)  SpO2: 97% (04 Jun 2019 16:21) (96% - 98%)    PHYSICAL EXAM    GENERAL: NAD, thin  EYES:  conjunctiva and sclera clear  NECK: Supple, No JVD/Bruit  NERVOUS SYSTEM:  A/O x3,   CHEST:  No rales, No rhonchi  HEART:  RRR, No murmur  ABDOMEN: Soft, NT/ND BS+  EXTREMITIES:  No Edema;  SKIN: No rashes    04 Jun 2019 10:21    139    |  102    |  43.0   ----------------------------<  125    3.7     |  22.0   |  3.05     Ca    9.2        04 Jun 2019 10:21  Phos  4.9       04 Jun 2019 10:21  Mg     2.5       04 Jun 2019 10:21                            9.7    4.5   )-----------( 185      ( 03 Jun 2019 06:18 )             30.0

## 2019-06-04 NOTE — PROGRESS NOTE ADULT - ASSESSMENT
89 yo female with hx of Chronic diastolic CHFEF70%, mild AS, compression fx [id number], hypothyroid, breast cancer s/p lumpectomy and radiation in 2007, gallstone pancreatitis in April 2019 who currently presented to the ED with complaint of chest pressure and SOB  noted with elevated proBNP as well as mod b/l pleural effusions on imaging and admitted with acute on chronic diastolic HF exacerbation. Patient initially receiving IV diuresis switched to po yesterday, known ckd 4 and monitoring creatine on diuresis. Lasix held today due to worsening kidney function and resolution of CHF symptoms. Also incidentally found to have acute sigmoid diverticulitis, likely chest and abd pain is also from this, elevated procalcitonin and initial hypothermia, empirically placed on cipro and flagyl with improvement in hypothermia and sx. Hospital course further complicated by noted left femoral neck fracture, for which initially ortho consulted and recommended MRI but the patient is refusing. Given refual and likely injury in January, will be treated as chronic left femoral neck fx and pt will be WBAT. PT consulted for dispo. Slow clinical improvement.      Acute on chronic diastolic CHF now resolved  -Symptoms resolved now, no more SOB or chest pressure - elevated probnp   -cardiac enzymes negative x 3  -changed lasix to 20mg po qd yesterday. Dose held today due to elevated creat and since patient redused  - d/w nephro bc creatine up trend to 3.1; but prior hospitalization with cr ~2-3 range and renal fxn will c/w monitored on diuresis   -strict i/os, daily weights   - fluid restriction   -c/w supplemental O2 as needed, will try to titrate off as needed   - Prior recent April 2019 2D echo noted. Only mild AS on report. EF 70%.  -cardio f/u noted and appreciated     Type 1 second degree AV block  Patient with asymptomatic bradycardia overnight  As per cardio, no need for pacemaker at this time and possibly discharge in am from their standpoint    Acute nondisplaced subcapital left femoral neck fracture  -pt reports injury in January and  states she has been ambulating with her walker without complications. Likely chronic fracture.  - found on CT abd/pelvis    -X-rays completed and also showed left femoral neck fracture   -ortho consult noted and appreciated, recommended MRI of the left hip for further info but patient is refusing the test due to calustrophobia. Also likely chronic fx, recommended WBAT with walker.   - PT consulted for further recommendations.     Acute diverticulitis  - pt is asx tolerating diet well   -c/w ciprofloxacin 400 mg iv q24h started on 05/02/19 D#3  -c/w metronidazole 500 mg iv q24h started on 05/02/19  D#2  - PPI held due to worsening creatinine as per nephro recommendations   - Note on ct imaging   - pt will need repeat imaging in 4-6 weeks and colonoscopy as an outpatient     NAVIN on CKD Stage 4  - Creatine worsened today: 3.16; prior from April was ~ 2-3  - changed lasix to po and d/w nephro will c/w monitoring no need for discontinuing lasix at this time   - hyperkalemia resolved   -avoid nephrotoxic meds  - PPI and lasix held today due to worsening creatinine as per nephro recommendations   -will monitor renal fxn closely on diuresis     Hypertension  - bp stable   -c/w Hydralazine 25 mg po bid    Macrocytic anemia  -Anemia panel noted  - hgb stable ~9     Hypothyroidism  -c/w Synthroid 25 mcg po qdaily    DVT ppx;  -c/w heparin sq     Advanced Directives: DNR/DNI  - prior MOLST completed  Next of kin: Daughter Morena  Patient is frail/ elderly with multiple co morbidities, on initial conversation with daughter she wanted patient to be DNR/DNI without aggressive measures but wanted MOLST changed to if the pt needed hospital medical intervention to be treated. Pt is still independent in ambulation with walker, given current state would benefit from palliative consult for further discussion in regards to home hospice as an option for disposition.     Dispo: PT consulted, pt is WBAT and at baseline ambulated with a rolling walker. Anticipated discharge in 1-2 days.

## 2019-06-04 NOTE — PROGRESS NOTE ADULT - ASSESSMENT
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 dc in am

## 2019-06-04 NOTE — PHYSICAL THERAPY INITIAL EVALUATION ADULT - CRITERIA FOR SKILLED THERAPEUTIC INTERVENTIONS
anticipated discharge recommendation/anticipated equipment needs at discharge/impairments found/Home with RW, Home PT

## 2019-06-05 LAB
ANION GAP SERPL CALC-SCNC: 12 MMOL/L — SIGNIFICANT CHANGE UP (ref 5–17)
BUN SERPL-MCNC: 46 MG/DL — HIGH (ref 8–20)
CALCIUM SERPL-MCNC: 8.9 MG/DL — SIGNIFICANT CHANGE UP (ref 8.6–10.2)
CHLORIDE SERPL-SCNC: 104 MMOL/L — SIGNIFICANT CHANGE UP (ref 98–107)
CO2 SERPL-SCNC: 24 MMOL/L — SIGNIFICANT CHANGE UP (ref 22–29)
CREAT SERPL-MCNC: 2.98 MG/DL — HIGH (ref 0.5–1.3)
GLUCOSE SERPL-MCNC: 69 MG/DL — LOW (ref 70–115)
HCT VFR BLD CALC: 27.1 % — LOW (ref 37–47)
HGB BLD-MCNC: 8.8 G/DL — LOW (ref 12–16)
MCHC RBC-ENTMCNC: 32.5 G/DL — SIGNIFICANT CHANGE UP (ref 32–36)
MCHC RBC-ENTMCNC: 33.6 PG — HIGH (ref 27–31)
MCV RBC AUTO: 103.4 FL — HIGH (ref 81–99)
PLATELET # BLD AUTO: 153 K/UL — SIGNIFICANT CHANGE UP (ref 150–400)
POTASSIUM SERPL-MCNC: 4.2 MMOL/L — SIGNIFICANT CHANGE UP (ref 3.5–5.3)
POTASSIUM SERPL-SCNC: 4.2 MMOL/L — SIGNIFICANT CHANGE UP (ref 3.5–5.3)
RBC # BLD: 2.62 M/UL — LOW (ref 4.4–5.2)
RBC # FLD: 14.6 % — SIGNIFICANT CHANGE UP (ref 11–15.6)
SODIUM SERPL-SCNC: 140 MMOL/L — SIGNIFICANT CHANGE UP (ref 135–145)
WBC # BLD: 4.1 K/UL — LOW (ref 4.8–10.8)
WBC # FLD AUTO: 4.1 K/UL — LOW (ref 4.8–10.8)

## 2019-06-05 PROCEDURE — 99232 SBSQ HOSP IP/OBS MODERATE 35: CPT | Mod: GC

## 2019-06-05 RX ORDER — CIPROFLOXACIN LACTATE 400MG/40ML
200 VIAL (ML) INTRAVENOUS EVERY 12 HOURS
Refills: 0 | Status: DISCONTINUED | OUTPATIENT
Start: 2019-06-05 | End: 2019-06-07

## 2019-06-05 RX ORDER — IRON SUCROSE 20 MG/ML
100 INJECTION, SOLUTION INTRAVENOUS EVERY 24 HOURS
Refills: 0 | Status: DISCONTINUED | OUTPATIENT
Start: 2019-06-05 | End: 2019-06-07

## 2019-06-05 RX ADMIN — Medication 1 TABLET(S): at 13:16

## 2019-06-05 RX ADMIN — Medication 100 MILLIGRAM(S): at 05:56

## 2019-06-05 RX ADMIN — Medication 20 MILLIGRAM(S): at 09:01

## 2019-06-05 RX ADMIN — Medication 25 MILLIGRAM(S): at 09:01

## 2019-06-05 RX ADMIN — Medication 25 MICROGRAM(S): at 05:54

## 2019-06-05 RX ADMIN — IRON SUCROSE 210 MILLIGRAM(S): 20 INJECTION, SOLUTION INTRAVENOUS at 13:16

## 2019-06-05 RX ADMIN — HEPARIN SODIUM 5000 UNIT(S): 5000 INJECTION INTRAVENOUS; SUBCUTANEOUS at 22:45

## 2019-06-05 RX ADMIN — Medication 200 MILLIGRAM(S): at 05:56

## 2019-06-05 RX ADMIN — Medication 25 MILLIGRAM(S): at 18:22

## 2019-06-05 RX ADMIN — HEPARIN SODIUM 5000 UNIT(S): 5000 INJECTION INTRAVENOUS; SUBCUTANEOUS at 13:16

## 2019-06-05 RX ADMIN — Medication 81 MILLIGRAM(S): at 13:17

## 2019-06-05 RX ADMIN — Medication 100 MILLIGRAM(S): at 23:10

## 2019-06-05 RX ADMIN — Medication 100 MILLIGRAM(S): at 13:16

## 2019-06-05 RX ADMIN — Medication 100 MILLIGRAM(S): at 18:22

## 2019-06-05 RX ADMIN — HEPARIN SODIUM 5000 UNIT(S): 5000 INJECTION INTRAVENOUS; SUBCUTANEOUS at 05:54

## 2019-06-05 NOTE — CHART NOTE - NSCHARTNOTEFT_GEN_A_CORE
Upon Nutritional Assessment by the Registered Dietitian your patient was determined to meet criteria / has evidence of the following diagnosis/diagnoses:          [x ] Severe Protein Calorie Malnutrition          Findings as based on:  •  Comprehensive nutrition assessment and consultation  •  Calorie counts (nutrient intake analysis)  •  Food acceptance and intake status from observations by staff  •  Follow up  •  Patient education  •  Intervention secondary to interdisciplinary rounds  •   concerns      Treatment:    The following diet has been recommended:  Add Ensure tid    PROVIDER Section:     By signing this assessment you are acknowledging and agree with the diagnosis/diagnoses assigned by the Registered Dietitian    Comments:

## 2019-06-05 NOTE — DIETITIAN INITIAL EVALUATION ADULT. - PHYSICAL APPEARANCE
BMI 16.2.  NFPE performed- severe muscle wasting at temples, clavicle, shoulder, interosseous.  Moderate fat loss- orbital, buccal pads, triceps./underweight

## 2019-06-05 NOTE — PROGRESS NOTE ADULT - SUBJECTIVE AND OBJECTIVE BOX
[place] [facility] - [facility]                                   [address] [place] [address]                                                      PHONE: [phone]                                                         FAX: [phone]  [email or web address]  ---------------------------------------------------------------------------------------------------------------------------------    Overnight events/patient complaints: patient seen at bedside. She feels well this morning.       codeine (Vomiting)  penicillin (Rash; Anaphylaxis)  Sulfacetamide Sodium (Rash)    MEDICATIONS  (STANDING):  aspirin  chewable 81 milliGRAM(s) Oral daily  ciprofloxacin   IVPB 200 milliGRAM(s) IV Intermittent every 12 hours  dextrose 5%. 1000 milliLiter(s) (50 mL/Hr) IV Continuous <Continuous>  dextrose 50% Injectable 12.5 Gram(s) IV Push once  dextrose 50% Injectable 25 Gram(s) IV Push once  dextrose 50% Injectable 25 Gram(s) IV Push once  docusate sodium 100 milliGRAM(s) Oral three times a day  furosemide    Tablet 20 milliGRAM(s) Oral daily  heparin  Injectable 5000 Unit(s) SubCutaneous every 8 hours  hydrALAZINE 25 milliGRAM(s) Oral two times a day  levothyroxine 25 MICROGram(s) Oral daily  metroNIDAZOLE  IVPB 500 milliGRAM(s) IV Intermittent every 12 hours  polyethylene glycol 3350 17 Gram(s) Oral daily  senna 2 Tablet(s) Oral at bedtime    MEDICATIONS  (PRN):  acetaminophen   Tablet .. 325 milliGRAM(s) Oral every 4 hours PRN Mild Pain (1 - 3), Moderate Pain (4 - 6)  dextrose 40% Gel 15 Gram(s) Oral once PRN Blood Glucose LESS THAN 70 milliGRAM(s)/deciLiter  glucagon  Injectable 1 milliGRAM(s) IntraMuscular once PRN Glucose <70 milliGRAM(s)/deciLiter  hydrALAZINE Injectable 5 milliGRAM(s) IV Push every 4 hours PRN for SBP > 170 or DBPO > 100      Vital Signs Last 24 Hrs  T(C): 36.3 (2019 09:53), Max: 36.7 (2019 06:05)  T(F): 97.4 (2019 09:53), Max: 98.1 (2019 06:05)  HR: 68 (2019 09:53) (63 - 77)  BP: 118/45 (2019 09:53) (118/45 - 168/56)  BP(mean): --  RR: 16 (:53) (16 - 16)  SpO2: 96% (2019 09:53) (95% - 99%)  ICU Vital Signs Last 24 Hrs  LUIS MONET  I&O's Detail    2019 07:01  -  2019 07:00  --------------------------------------------------------  IN:    Oral Fluid: 220 mL  Total IN: 220 mL    OUT:    Voided: 850 mL  Total OUT: 850 mL    Total NET: -630 mL        Drug Dosing Weight  LUIS CHIKI      PHYSICAL EXAM:  General: Appears well developed, well nourished alert and cooperative.  HEENT: Head; normocephalic, atraumatic.  Eyes: Pupils reactive, cornea wnl.  Neck: Supple, no nodes adenopathy, no NVD or carotid bruit or thyromegaly.  CARDIOVASCULAR: Normal S1 and S2, No murmur, rub, gallop or lift.   LUNGS: No rales, rhonchi or wheeze. Normal breath sounds bilaterally.  ABDOMEN: Soft, nontender without mass or organomegaly. bowel sounds normoactive.  EXTREMITIES: No clubbing, cyanosis or edema. Distal pulses wnl.   SKIN: warm and dry with normal turgor.  NEURO: Alert/oriented x 3/normal motor exam. No pathologic reflexes.    PSYCH: normal affect.        LABS:                        8.8    4.1   )-----------( 153      ( 2019 06:19 )             27.1     06-05    140  |  104  |  46.0<H>  ----------------------------<  69<L>  4.2   |  24.0  |  2.98<H>    Ca    8.9      2019 06:19  Phos  4.9     06-  Mg     2.5     06-      LUIS MONET        Urinalysis Basic - ( 2019 20:50 )    Color: Yellow / Appearance: Clear / S.010 / pH: x  Gluc: x / Ketone: Negative  / Bili: Negative / Urobili: Negative mg/dL   Blood: x / Protein: 100 mg/dL / Nitrite: Negative   Leuk Esterase: Negative / RBC: x / WBC x   Sq Epi: x / Non Sq Epi: x / Bacteria: x        RADIOLOGY & ADDITIONAL STUDIES:    INTERPRETATION OF TELEMETRY (personally reviewed): sinus rhythm with occasional blocked APCs    ASSESSMENT AND PLAN:  In summary, LUIS MONET is an 89y Female with past medical history significant for HFpEF, HTN, HCM without obstruction admitted with acute on chronic HFpEF.    Acute on Chronic HFpEF -- now resolved and there is no further clinical evidence of heart failure.  - continue Lasix 20 mg daily  - no further inpatient cardiac workup indicated at this time    Patient is stable for discharge from cardiac perspective. I will arrange for close outpatient follow up in our office.

## 2019-06-05 NOTE — PROGRESS NOTE ADULT - SUBJECTIVE AND OBJECTIVE BOX
NEPHROLOGY INTERVAL HPI/OVERNIGHT EVENTS: No new events.    MEDICATIONS  (STANDING):  aspirin  chewable 81 milliGRAM(s) Oral daily  ciprofloxacin   IVPB 200 milliGRAM(s) IV Intermittent every 12 hours  dextrose 5%. 1000 milliLiter(s) (50 mL/Hr) IV Continuous <Continuous>  dextrose 50% Injectable 12.5 Gram(s) IV Push once  dextrose 50% Injectable 25 Gram(s) IV Push once  dextrose 50% Injectable 25 Gram(s) IV Push once  docusate sodium 100 milliGRAM(s) Oral three times a day  furosemide    Tablet 20 milliGRAM(s) Oral daily  heparin  Injectable 5000 Unit(s) SubCutaneous every 8 hours  hydrALAZINE 25 milliGRAM(s) Oral two times a day  iron sucrose IVPB 100 milliGRAM(s) IV Intermittent every 24 hours  levothyroxine 25 MICROGram(s) Oral daily  metroNIDAZOLE  IVPB 500 milliGRAM(s) IV Intermittent every 12 hours  Nephro-jyotsna 1 Tablet(s) Oral daily  polyethylene glycol 3350 17 Gram(s) Oral daily  senna 2 Tablet(s) Oral at bedtime    MEDICATIONS  (PRN):  acetaminophen   Tablet .. 325 milliGRAM(s) Oral every 4 hours PRN Mild Pain (1 - 3), Moderate Pain (4 - 6)  dextrose 40% Gel 15 Gram(s) Oral once PRN Blood Glucose LESS THAN 70 milliGRAM(s)/deciLiter  glucagon  Injectable 1 milliGRAM(s) IntraMuscular once PRN Glucose <70 milliGRAM(s)/deciLiter  hydrALAZINE Injectable 5 milliGRAM(s) IV Push every 4 hours PRN for SBP > 170 or DBPO > 100      Allergies    codeine (Vomiting)  penicillin (Rash; Anaphylaxis)  Sulfacetamide Sodium (Rash)            Vital Signs Last 24 Hrs  T(C): 36.3 (2019 09:53), Max: 36.7 (2019 06:05)  T(F): 97.4 (2019 09:53), Max: 98.1 (2019 06:05)  HR: 68 (2019 09:53) (63 - 77)  BP: 118/45 (2019 09:53) (118/45 - 168/56)  BP(mean): --  RR: 16 (2019 09:53) (16 - 16)  SpO2: 96% (2019 09:53) (95% - 99%)  Daily     Daily Weight in k.6 (2019 06:01)    PHYSICAL EXAM:    GENERAL: Appears chronically ill  HEAD: wnl   EYES:   ENMT:   NECK: veins not distended  NERVOUS SYSTEM: alert, verbal  CHEST/LUNG: no 02, no wheezes  HEART: 1/6 systolic murmur  ABDOMEN: soft not tender  EXTREMITIES:  diffuse muscle wasting noted  LYMPH:   SKIN: pale   neg      LABS:                        8.8    4.1   )-----------( 153      ( 2019 06:19 )             27.1     06-05    140  |  104  |  46.0<H>  ----------------------------<  69<L>  4.2   |  24.0  |  2.98<H>    Ca    8.9      2019 06:19  Phos  4.9     06-04  Mg     2.5     06-04        Urinalysis Basic - ( 2019 20:50 )    Color: Yellow / Appearance: Clear / S.010 / pH: x  Gluc: x / Ketone: Negative  / Bili: Negative / Urobili: Negative mg/dL   Blood: x / Protein: 100 mg/dL / Nitrite: Negative   Leuk Esterase: Negative / RBC: x / WBC x   Sq Epi: x / Non Sq Epi: x / Bacteria: x              RADIOLOGY & ADDITIONAL TESTS:

## 2019-06-05 NOTE — PROGRESS NOTE ADULT - SUBJECTIVE AND OBJECTIVE BOX
CC: Patient is a 89y old  Female who presents with a chief complaint of acute CHF exacerbation (01 Jun 2019 23:50) improving sob     Overnight/ AM events:   Patient seen and examined at bedside during rounds. She days that she feels weak and bad when she receives his medications because she normally takes them at a different time at home and doesn't like to take them with the hospital schedule.     Patient not ambulating but as per PT she is mostly independent and only needs a Rolling walker    Not on telemonitor    ROS: Denies fever, abdominal pain, diarrhea, constipation, calf pain., SOB, Chest pain    VS:   Vital Signs Last 24 Hrs  T(C): 36.7 (05 Jun 2019 15:35), Max: 36.7 (05 Jun 2019 06:05)  T(F): 98 (05 Jun 2019 15:35), Max: 98.1 (05 Jun 2019 06:05)  HR: 67 (05 Jun 2019 15:35) (63 - 77)  BP: 148/56 (05 Jun 2019 15:35) (118/45 - 153/59)  RR: 16 (05 Jun 2019 15:35) (16 - 16)  SpO2: 94% (05 Jun 2019 15:35) (94% - 99%) on room air      Physical Exam:   Gen: WD WN NAD  HEENT: NCAT, EOMI, PERRLA  CVS: RRR, +S1/S2, + GIII/VI holosystolic murmur, no rubs or gallops appreciated, chest wall tenderness noted across upper chest  Lungs: CTAB, no wheeze, rales, rhonchi  Abdomen: +BS, soft, ND, NT. No palpable flank tenderness or mass, no CVA tenderness  Ext: No cyanosis, edema, no b/l hip or calf tenderness  Neuro: AAOx3, no focal deficits.    Labs:      06-04    142  |  105  |  44.0<H>  ----------------------------<  69<L>  4.6   |  25.0  |  3.16<H>    Ca    9.1      04 Jun 2019 06:15  Phos  5.4     06-04  Mg     2.5     06-04                            9.7    4.5   )-----------( 185      ( 03 Jun 2019 06:18 )             30.0       Radiology:  < from: CT Abdomen and Pelvis No Cont (06.02.19 @ 03:24) >  IMPRESSION:   1. Mild acute diverticulitis of the proximal sigmoid colon.   2. Moderate bilateral pleural effusions with associated compressive   atelectasis.   3. Acute appearing nondisplaced subcapital left femoral neck fracture.    ******PRELIMINARY REPORT******      < end of copied text >    MEDICATIONS  (STANDING):  aspirin  chewable 81 milliGRAM(s) Oral daily  ciprofloxacin   IVPB 400 milliGRAM(s) IV Intermittent every 24 hours  ciprofloxacin   IVPB      dextrose 5%. 1000 milliLiter(s) (50 mL/Hr) IV Continuous <Continuous>  dextrose 50% Injectable 12.5 Gram(s) IV Push once  dextrose 50% Injectable 25 Gram(s) IV Push once  dextrose 50% Injectable 25 Gram(s) IV Push once  furosemide    Tablet 20 milliGRAM(s) Oral daily  heparin  Injectable 5000 Unit(s) SubCutaneous every 8 hours  hydrALAZINE 25 milliGRAM(s) Oral two times a day  levothyroxine 25 MICROGram(s) Oral daily  metroNIDAZOLE  IVPB 500 milliGRAM(s) IV Intermittent every 12 hours    MEDICATIONS  (PRN):  acetaminophen   Tablet .. 325 milliGRAM(s) Oral every 4 hours PRN Mild Pain (1 - 3), Moderate Pain (4 - 6)  dextrose 40% Gel 15 Gram(s) Oral once PRN Blood Glucose LESS THAN 70 milliGRAM(s)/deciLiter  glucagon  Injectable 1 milliGRAM(s) IntraMuscular once PRN Glucose <70 milliGRAM(s)/deciLiter  hydrALAZINE Injectable 5 milliGRAM(s) IV Push every 4 hours PRN for SBP > 170 or DBPO > 100 CC: Patient is a 89y old  Female who presents with a chief complaint of acute CHF exacerbation (01 Jun 2019 23:50) improving sob     Overnight/ AM events:   Patient seen and examined at bedside during rounds. She days that she feels weak and bad when she receives his medications because she normally takes them at a different time at home and doesn't like to take them with the hospital schedule.     Patient not ambulating but as per PT she is mostly independent and only needs a Rolling walker      ROS: Denies fever, abdominal pain, diarrhea, constipation, calf pain., SOB, Chest pain    VS:   Vital Signs Last 24 Hrs  T(C): 36.7 (05 Jun 2019 15:35), Max: 36.7 (05 Jun 2019 06:05)  T(F): 98 (05 Jun 2019 15:35), Max: 98.1 (05 Jun 2019 06:05)  HR: 67 (05 Jun 2019 15:35) (63 - 77)  BP: 148/56 (05 Jun 2019 15:35) (118/45 - 153/59)  RR: 16 (05 Jun 2019 15:35) (16 - 16)  SpO2: 94% (05 Jun 2019 15:35) (94% - 99%) on room air    Telemonitor: HR: 60's No alarms on the monitor overnight,     Physical Exam:   Gen: WD WN NAD  HEENT: NCAT, EOMI, PERRLA  CVS: RRR, +S1/S2, + GIII/VI holosystolic murmur, no rubs or gallops appreciated, chest wall tenderness noted across upper chest  Lungs: CTAB, no wheeze, rales, rhonchi  Abdomen: +BS, soft, ND, NT. No palpable flank tenderness or mass, no CVA tenderness  Ext: No cyanosis, edema, no b/l hip or calf tenderness  Neuro: AAOx3, no focal deficits.    Labs:                          8.8    4.1   )-----------( 153      ( 05 Jun 2019 06:19 )             27.1       06-05    140  |  104  |  46.0<H>  ----------------------------<  69<L>  4.2   |  24.0  |  2.98<H>    Ca    8.9      05 Jun 2019 06:19  Phos  4.9     06-04  Mg     2.5     06-04            Radiology:  < from: CT Abdomen and Pelvis No Cont (06.02.19 @ 03:24) >  IMPRESSION:   1. Mild acute diverticulitis of the proximal sigmoid colon.   2. Moderate bilateral pleural effusions with associated compressive   atelectasis.   3. Acute appearing nondisplaced subcapital left femoral neck fracture.    ******PRELIMINARY REPORT******      < end of copied text >      MEDICATIONS  (STANDING):  aspirin  chewable 81 milliGRAM(s) Oral daily  ciprofloxacin   IVPB 200 milliGRAM(s) IV Intermittent every 12 hours  dextrose 5%. 1000 milliLiter(s) (50 mL/Hr) IV Continuous <Continuous>  dextrose 50% Injectable 12.5 Gram(s) IV Push once  dextrose 50% Injectable 25 Gram(s) IV Push once  dextrose 50% Injectable 25 Gram(s) IV Push once  docusate sodium 100 milliGRAM(s) Oral three times a day  furosemide    Tablet 20 milliGRAM(s) Oral daily  heparin  Injectable 5000 Unit(s) SubCutaneous every 8 hours  hydrALAZINE 25 milliGRAM(s) Oral two times a day  iron sucrose IVPB 100 milliGRAM(s) IV Intermittent every 24 hours  levothyroxine 25 MICROGram(s) Oral daily  metroNIDAZOLE  IVPB 500 milliGRAM(s) IV Intermittent every 12 hours  Nephro-jyotsna 1 Tablet(s) Oral daily  polyethylene glycol 3350 17 Gram(s) Oral daily  senna 2 Tablet(s) Oral at bedtime    MEDICATIONS  (PRN):  acetaminophen   Tablet .. 325 milliGRAM(s) Oral every 4 hours PRN Mild Pain (1 - 3), Moderate Pain (4 - 6)  dextrose 40% Gel 15 Gram(s) Oral once PRN Blood Glucose LESS THAN 70 milliGRAM(s)/deciLiter  glucagon  Injectable 1 milliGRAM(s) IntraMuscular once PRN Glucose <70 milliGRAM(s)/deciLiter  hydrALAZINE Injectable 5 milliGRAM(s) IV Push every 4 hours PRN for SBP > 170 or DBPO > 100 CC: Patient is a 89y old  Female who presents with a chief complaint of acute CHF exacerbation (01 Jun 2019 23:50) improving sob/ colitis and now pending dispo     Overnight/ AM events:   Patient seen and examined at bedside during rounds. She days that she feels weak and bad when she receives her medications because she normally takes them at a different time at home and doesn't like to take them with the hospital schedule. Aside from this has not had any more chest pain and feels ready to go home. D/w her that the hospice team will reach out to her daughter to discuss more in depth.       ROS: Denies fever, abdominal pain, diarrhea, constipation, calf pain., SOB, Chest pain    VS:   Vital Signs Last 24 Hrs  T(C): 36.7 (05 Jun 2019 15:35), Max: 36.7 (05 Jun 2019 06:05)  T(F): 98 (05 Jun 2019 15:35), Max: 98.1 (05 Jun 2019 06:05)  HR: 67 (05 Jun 2019 15:35) (63 - 77)  BP: 148/56 (05 Jun 2019 15:35) (118/45 - 153/59)  RR: 16 (05 Jun 2019 15:35) (16 - 16)  SpO2: 94% (05 Jun 2019 15:35) (94% - 99%) on room air    Telemonitor: HR: 60's No alarms on the monitor overnight,     Physical Exam:   Gen: WD WN NAD  HEENT: NCAT, EOMI, PERRLA  CVS: RRR, +S1/S2, + GIII/VI holosystolic murmur, no rubs or gallops appreciated, chest wall tenderness noted across upper chest  Lungs: CTAB, no wheeze, rales, rhonchi  Abdomen: +BS, soft, ND, NT. No palpable flank tenderness or mass, no CVA tenderness  Ext: No cyanosis, edema, no b/l hip or calf tenderness  Neuro: AAOx3, no focal deficits.    Labs:                          8.8    4.1   )-----------( 153      ( 05 Jun 2019 06:19 )             27.1       06-05    140  |  104  |  46.0<H>  ----------------------------<  69<L>  4.2   |  24.0  |  2.98<H>    Ca    8.9      05 Jun 2019 06:19  Phos  4.9     06-04  Mg     2.5     06-04            Radiology:  < from: CT Abdomen and Pelvis No Cont (06.02.19 @ 03:24) >  IMPRESSION:   1. Mild acute diverticulitis of the proximal sigmoid colon.   2. Moderate bilateral pleural effusions with associated compressive   atelectasis.   3. Acute appearing nondisplaced subcapital left femoral neck fracture.    ******PRELIMINARY REPORT******      < end of copied text >      MEDICATIONS  (STANDING):  aspirin  chewable 81 milliGRAM(s) Oral daily  ciprofloxacin   IVPB 200 milliGRAM(s) IV Intermittent every 12 hours  dextrose 5%. 1000 milliLiter(s) (50 mL/Hr) IV Continuous <Continuous>  dextrose 50% Injectable 12.5 Gram(s) IV Push once  dextrose 50% Injectable 25 Gram(s) IV Push once  dextrose 50% Injectable 25 Gram(s) IV Push once  docusate sodium 100 milliGRAM(s) Oral three times a day  furosemide    Tablet 20 milliGRAM(s) Oral daily  heparin  Injectable 5000 Unit(s) SubCutaneous every 8 hours  hydrALAZINE 25 milliGRAM(s) Oral two times a day  iron sucrose IVPB 100 milliGRAM(s) IV Intermittent every 24 hours  levothyroxine 25 MICROGram(s) Oral daily  metroNIDAZOLE  IVPB 500 milliGRAM(s) IV Intermittent every 12 hours  Nephro-jyotsna 1 Tablet(s) Oral daily  polyethylene glycol 3350 17 Gram(s) Oral daily  senna 2 Tablet(s) Oral at bedtime    MEDICATIONS  (PRN):  acetaminophen   Tablet .. 325 milliGRAM(s) Oral every 4 hours PRN Mild Pain (1 - 3), Moderate Pain (4 - 6)  dextrose 40% Gel 15 Gram(s) Oral once PRN Blood Glucose LESS THAN 70 milliGRAM(s)/deciLiter  glucagon  Injectable 1 milliGRAM(s) IntraMuscular once PRN Glucose <70 milliGRAM(s)/deciLiter  hydrALAZINE Injectable 5 milliGRAM(s) IV Push every 4 hours PRN for SBP > 170 or DBPO > 100

## 2019-06-05 NOTE — DIETITIAN INITIAL EVALUATION ADULT. - ETIOLOGY
related to inadequate protein energy intake with decreased appetite in setting of advanced age and poor taste

## 2019-06-05 NOTE — PROGRESS NOTE ADULT - ASSESSMENT
89 yo female with hx of Chronic diastolic CHFEF70%, mild AS, compression fx [id number], hypothyroid, breast cancer s/p lumpectomy and radiation in 2007, gallstone pancreatitis in April 2019 who currently presented to the ED with complaint of chest pressure and SOB  noted with elevated proBNP as well as mod b/l pleural effusions on imaging and admitted with acute on chronic diastolic HF exacerbation. Patient initially receiving IV diuresis switched to po yesterday, known ckd 4 and monitoring creatine on diuresis. Lasix held today due to worsening kidney function and resolution of CHF symptoms. Also incidentally found to have acute sigmoid diverticulitis, likely chest and abd pain is also from this, elevated procalcitonin and initial hypothermia, empirically placed on cipro and flagyl with improvement in hypothermia and sx. Hospital course further complicated by noted left femoral neck fracture, for which initially ortho consulted and recommended MRI but the patient is refusing. Given refual and likely injury in January, will be treated as chronic left femoral neck fx and pt will be WBAT. PT consulted for dispo. Slow clinical improvement.      Acute on chronic diastolic CHF now resolved  -Symptoms resolved now, no more SOB or chest pressure - elevated probnp   -cardiac enzymes negative x 3  -changed lasix to 20mg po qd yesterday. Dose held today due to elevated creat and since patient redused  - d/w nephro bc creatine up trend to 3.1; but prior hospitalization with cr ~2-3 range and renal fxn will c/w monitored on diuresis   -strict i/os, daily weights   - fluid restriction   -c/w supplemental O2 as needed, will try to titrate off as needed   - Prior recent April 2019 2D echo noted. Only mild AS on report. EF 70%.  -cardio f/u noted and appreciated     Type 1 second degree AV block  Patient with asymptomatic bradycardia overnight  As per cardio, no need for pacemaker at this time and possibly discharge in am from their standpoint    Acute nondisplaced subcapital left femoral neck fracture  -pt reports injury in January and  states she has been ambulating with her walker without complications. Likely chronic fracture.  - found on CT abd/pelvis    -X-rays completed and also showed left femoral neck fracture   -ortho consult noted and appreciated, recommended MRI of the left hip for further info but patient is refusing the test due to calustrophobia. Also likely chronic fx, recommended WBAT with walker.   - PT consulted for further recommendations.     Acute diverticulitis  - pt is asx tolerating diet well   -c/w ciprofloxacin 400 mg iv q24h started on 05/02/19 D#3  -c/w metronidazole 500 mg iv q24h started on 05/02/19  D#2  - PPI held due to worsening creatinine as per nephro recommendations   - Note on ct imaging   - pt will need repeat imaging in 4-6 weeks and colonoscopy as an outpatient     NAVIN on CKD Stage 4  - Creatine worsened today: 3.16; prior from April was ~ 2-3  - changed lasix to po and d/w nephro will c/w monitoring no need for discontinuing lasix at this time   - hyperkalemia resolved   -avoid nephrotoxic meds  - PPI and lasix held today due to worsening creatinine as per nephro recommendations   -will monitor renal fxn closely on diuresis     Hypertension  - bp stable   -c/w Hydralazine 25 mg po bid    Macrocytic anemia  -Anemia panel noted  - hgb stable ~9     Hypothyroidism  -c/w Synthroid 25 mcg po qdaily    DVT ppx;  -c/w heparin sq     Advanced Directives: DNR/DNI  - prior MOLST completed  Next of kin: Daughter Morena  Patient is frail/ elderly with multiple co morbidities, on initial conversation with daughter she wanted patient to be DNR/DNI without aggressive measures but wanted MOLST changed to if the pt needed hospital medical intervention to be treated. Pt is still independent in ambulation with walker, given current state would benefit from palliative consult for further discussion in regards to home hospice as an option for disposition.     Dispo: PT consulted, pt is WBAT and at baseline ambulated with a rolling walker. Anticipated discharge in 1-2 days. 89 yo female with hx of Chronic diastolic CHFEF70%, mild AS, compression fx [id number], hypothyroid, breast cancer s/p lumpectomy and radiation in 2007, gallstone pancreatitis in April 2019 who currently presented to the ED with complaint of chest pressure and SOB  noted with elevated proBNP as well as mod b/l pleural effusions on imaging and admitted with acute on chronic diastolic HF exacerbation. Patient initially receiving IV diuresis switched to po yesterday, known ckd 4 and monitoring creatine on diuresis. Lasix held today due to worsening kidney function and resolution of CHF symptoms. Also incidentally found to have acute sigmoid diverticulitis, likely chest and abd pain is also from this, elevated procalcitonin and initial hypothermia, empirically placed on cipro and flagyl with improvement in hypothermia and sx. Hospital course further complicated by noted left femoral neck fracture, for which initially ortho consulted and recommended MRI but the patient is refusing. Given refual and likely injury in January, will be treated as chronic left femoral neck fx and pt will be WBAT. PT recommended home with rolling walker but as per discussion with daughter over the phone, she would like to explore the option of hospice care.       Acute on chronic diastolic CHF now resolved  -Symptoms resolved now, no more SOB or chest pressure - elevated probnp   -cardiac enzymes negative x 3  -C/w lasix to 20mg PO , changed from IV on 06/03.  - d/w nephro bc creatine up trend to 3.1 but remain stable at this time; also during prior hospitalization with cr ~2-3 range and renal fxn will c/w monitored on diuresis   -strict i/os, daily weights  -c/w supplemental O2 as needed, will try to titrate off as needed   - Prior recent April 2019 2D echo noted. Only mild AS on report. EF 70%.  -cardio f/u noted and appreciated     Type 1 second degree AV block  Patient with asymptomatic bradycardia 2 nights ago. Now HR: 60's No alarms on the monitor overnight,   As per cardio, no need for pacemaker at this time and possibly discharge in am from their standpoint    Acute nondisplaced subcapital left femoral neck fracture  -pt reports injury in January and  states she has been ambulating with her walker without complications. Likely chronic fracture.  - found on CT abd/pelvis    -X-rays completed and also showed left femoral neck fracture   -Ortho consult noted and appreciated, recommended MRI of the left hip for further info but patient refused the test due to claustrophobia. Also likely chronic fx. Ortho then recommended WBAT with walker.   - PT consulted, recommended home with rolling walker    Acute diverticulitis  - pt is asx tolerating diet well   -c/w ciprofloxacin 400 mg iv q24h started on 05/02/19 D#4  -c/w metronidazole 500 mg iv q24h started on 05/02/19  D#4  - PPI held due to worsening creatinine as per nephro recommendations   - Note on ct imaging   - pt will need repeat imaging in 4-6 weeks and colonoscopy as an outpatient     NAVIN on CKD Stage 4  - Creatine worsened today: 3.16; prior from April was ~ 2-3  - changed lasix to po and d/w nephro will c/w monitoring no need for discontinuing lasix at this time   - hyperkalemia resolved   -avoid nephrotoxic meds  - PPI and lasix held today due to worsening creatinine as per nephro recommendations   -will monitor renal fxn closely on diuresis   -No florastor due to borderline leukopenia    Hypertension  - bp stable   -c/w Hydralazine 25 mg po bid    Macrocytic anemia  -Anemia panel noted  - hgb stable ~9     Hypothyroidism  -c/w Synthroid 25 mcg po Qdaily    DVT ppx;  -c/w heparin sq     Advanced Directives: DNR/DNI  - prior MOLST completed  Next of kin: Daughter Morena  Patient is frail/ elderly with multiple co morbidities, on initial conversation with daughter she wanted patient to be DNR/DNI without aggressive measures but wanted MOLST changed to if the pt needed hospital medical intervention to be treated. Pt is still independent in ambulation with walker, given current state would benefit from palliative consult for further discussion in regards to home hospice as an option for disposition to which daughter agreed over the phone to have a conversation about    Dispo: PT consulted, pt is WBAT and at baseline ambulated with a rolling walker. Plan was for discharge home tomorrow but now patient and patient's daughter are interested in explore the option for hospice. 89 yo female with hx of Chronic diastolic CHFEF70%, mild AS, compression fx [id number], hypothyroid, breast cancer s/p lumpectomy and radiation in 2007, gallstone pancreatitis in April 2019 who currently presented to the ED with complaint of chest pressure and SOB  noted with elevated proBNP as well as mod b/l pleural effusions on imaging and admitted with acute on chronic diastolic HF exacerbation. Patient initially receiving IV diuresis switched to po yesterday, known ckd 4 and monitoring creatine on diuresis. Lasix held today due to worsening kidney function and resolution of CHF symptoms. Also incidentally found to have acute sigmoid diverticulitis, likely chest and abd pain is also from this, elevated procalcitonin and initial hypothermia, empirically placed on cipro and flagyl with improvement in hypothermia and sx. Hospital course further complicated by noted left femoral neck fracture, for which initially ortho consulted and recommended MRI but the patient is refusing. Given refual and likely injury in January, will be treated as chronic left femoral neck fx and pt will be WBAT. PT recommended home with rolling walker but as per discussion with daughter over the phone, she would like to explore the option of hospice care.       Acute on chronic diastolic CHF now resolved  -Symptoms resolved now, no more SOB or chest pressure - elevated probnp   -cardiac enzymes negative x 3  -C/w lasix to 20mg PO , changed from IV on 06/03.  - d/w nephro bc creatine up trend to 3.1 but remain stable at this time; also during prior hospitalization with cr ~2-3 range and renal fxn will c/w monitored on diuresis   -strict i/os, daily weights  -c/w supplemental O2 as needed, will try to titrate off as needed   - Prior recent April 2019 2D echo noted. Only mild AS on report. EF 70%.  -cardio f/u noted and appreciated     Severe caloric malnutrition  DASH diet, taken off today since patient is not in acute exacerbation anymore  Nutrition consult noted  Added Ensure TID, MVI  Monitor nutrition status    Type 1 second degree AV block  Patient with asymptomatic bradycardia 2 nights ago. Now HR: 60's No alarms on the monitor overnight,   As per cardio, no need for pacemaker at this time no further cardiac workup at this time    Acute nondisplaced subcapital left femoral neck fracture  -pt reports injury in January and  states she has been ambulating with her walker without complications. Likely chronic fracture.  - found on CT abd/pelvis    -X-rays completed and also showed left femoral neck fracture   -Ortho consult noted and appreciated, recommended MRI of the left hip for further info but patient refused the test due to claustrophobia. Also likely chronic fx. Ortho then recommended WBAT with walker.   - PT consulted, recommended home with rolling walker    Acute diverticulitis  - pt is asx tolerating diet well   -c/w ciprofloxacin 200mg iv q24h started on 05/02/19 D#4, dose decreased today due to NAVIN  -c/w metronidazole 500 mg iv q24h started on 05/02/19  D#4  - PPI held due to worsening creatinine as per nephro recommendations   - Note on ct imaging   - pt will need repeat imaging in 4-6 weeks and colonoscopy as an outpatient   -No florastor due to borderline leukopenia    NAVIN on CKD Stage 4  - Creatine stable, somehow improved today: 2.96 from 3.16; prior from April was ~ 2-3  - changed lasix to po recently and d/w nephro will c/w monitoring no need for discontinuing lasix at this time   - hyperkalemia resolved   -avoid nephrotoxic meds  - PPI and lasix held today due to worsening creatinine as per nephro recommendations   -will monitor renal fxn closely on diuresis       Hypertension  - bp stable   -c/w Hydralazine 25 mg po bid    Macrocytic anemia  -Anemia panel noted  - hgb stable ~9     Hypothyroidism  -c/w Synthroid 25 mcg po Qdaily    DVT ppx;  -c/w heparin sq     Advanced Directives: DNR/DNI  - prior MOLST completed  Next of kin: Daughter Morena  Patient is frail/ elderly with multiple co morbidities, on initial conversation with daughter she wanted patient to be DNR/DNI without aggressive measures but wanted MOLST changed to if the pt needed hospital medical intervention to be treated. Pt is still independent in ambulation with walker, given current state would benefit from palliative consult for further discussion in regards to home hospice as an option for disposition to which daughter agreed over the phone to have a conversation about    Dispo: PT consulted, pt is WBAT and at baseline ambulated with a rolling walker. Plan was for discharge home tomorrow but now patient and patient's daughter are interested in explore the option for hospice. 89 yo female with hx of Chronic diastolic CHFEF70%, mild AS, compression fx [id number], hypothyroid, breast cancer s/p lumpectomy and radiation in 2007, gallstone pancreatitis in April 2019 who currently presented to the ED with complaint of chest pressure and SOB  noted with elevated proBNP as well as mod b/l pleural effusions on imaging and admitted with acute on chronic diastolic HF exacerbation. Patient initially receiving IV diuresis switched to po yesterday, known ckd 4 and monitoring creatine on diuresis. Also incidentally found to have acute sigmoid diverticulitis, likely chest and abd pain is also from this, elevated procalcitonin and initial hypothermia, empirically placed on cipro and flagyl with improvement in hypothermia and sx. Hospital course further complicated by noted left femoral neck fracture, for which initially ortho consulted and recommended MRI but the patient is refusing. Given refual and likely injury in January, will be treated as chronic left femoral neck fx and pt will be WBAT. PT recommended home with rolling walker but as per discussion with daughter over the phone, she would like to explore the option of hospice care. Pending dispo.    Acute on chronic diastolic HFPEF- now resolved  -Symptoms resolved now, no more SOB or chest pressure   - elevated probnp   -cardiac enzymes negative x 3  -C/w lasix to 20mg PO, changed from IV on 06/03.  - d/w nephro bc creatine up trend to 2-3 but remains stable at this time; also during prior hospitalization with cr ~2-3 range and renal fxn will c/w monitored on diuresis   -strict i/os, daily weights  -c/w supplemental O2 as needed, will try to titrate off as needed   - Prior recent April 2019 2D echo noted. Only mild AS on report. EF 70%.  -cardio f/u noted and appreciated     Acute nondisplaced subcapital left femoral neck fracture  -pt reports injury in January and  states she has been ambulating with her walker without complications. Likely chronic fracture.  - found on CT abd/pelvis    -X-rays completed and also showed left femoral neck fracture   -Ortho consult noted and appreciated, recommended MRI of the left hip for further info but patient refused the test due to claustrophobia. Also likely chronic fx. Ortho then recommended WBAT with walker.   - PT consulted, recommended home with rolling walker    Acute diverticulitis  - pt is asx tolerating diet well   -c/w ciprofloxacin 200mg iv q24h started on 05/02/19 D#4, dose decreased today due to NAVIN  -c/w metronidazole 500 mg iv q24h started on 05/02/19  D#4  - PPI held due to worsening creatinine as per nephro recommendations   - Note on ct imaging   - pt will need repeat imaging in 4-6 weeks and colonoscopy as an outpatient   -No florastor due to borderline leukopenia    NAVIN on CKD Stage 4  - Creatine stable, somehow improved today: 2.96 from 3.16; prior from April was ~ 2-3  - changed lasix to po recently and d/w nephro will c/w monitoring no need for discontinuing lasix at this time   - hyperkalemia resolved   -avoid nephrotoxic meds  - PPI and lasix held today due to worsening creatinine as per nephro recommendations   -will monitor renal fxn closely on diuresis       Hypertension  - bp stable   -c/w Hydralazine 25 mg po bid    Macrocytic anemia  -Anemia panel noted  - hgb stable ~9     Hypothyroidism  -c/w Synthroid 25 mcg po Qdaily    Severe caloric malnutrition  - DASH diet, taken off today since patient is not in acute exacerbation anymore  - Nutrition consult noted  -c/w  Ensure TID, MVI  - Monitor nutrition status    DVT ppx;  -c/w heparin sq     Advanced Directives: DNR/DNI  - prior MOLST completed  Next of kin: Daughter Morena  Hospice team to reach out to the patients daughter and discussed at length in regards to hospice services and how patient could benefit from intervention .      po: PT consulted, pt is WBAT and at baseline ambulated with a rolling walker. Patient and her daughter are exploring options for hospice, pending complete evaluation. Anticipated discharge in 24-48hours.

## 2019-06-06 LAB
CALCIUM SERPL-MCNC: 8.9 MG/DL — SIGNIFICANT CHANGE UP (ref 8.4–10.5)
PTH-INTACT FLD-MCNC: 405 PG/ML — HIGH (ref 15–65)

## 2019-06-06 PROCEDURE — 99232 SBSQ HOSP IP/OBS MODERATE 35: CPT | Mod: GC

## 2019-06-06 PROCEDURE — 99232 SBSQ HOSP IP/OBS MODERATE 35: CPT

## 2019-06-06 RX ORDER — ERYTHROPOIETIN 10000 [IU]/ML
10000 INJECTION, SOLUTION INTRAVENOUS; SUBCUTANEOUS ONCE
Refills: 0 | Status: COMPLETED | OUTPATIENT
Start: 2019-06-06 | End: 2019-06-06

## 2019-06-06 RX ORDER — LACTULOSE 10 G/15ML
15 SOLUTION ORAL ONCE
Refills: 0 | Status: COMPLETED | OUTPATIENT
Start: 2019-06-06 | End: 2019-06-06

## 2019-06-06 RX ADMIN — Medication 100 MILLIGRAM(S): at 09:19

## 2019-06-06 RX ADMIN — Medication 100 MILLIGRAM(S): at 13:17

## 2019-06-06 RX ADMIN — IRON SUCROSE 210 MILLIGRAM(S): 20 INJECTION, SOLUTION INTRAVENOUS at 15:55

## 2019-06-06 RX ADMIN — Medication 1 TABLET(S): at 13:16

## 2019-06-06 RX ADMIN — Medication 100 MILLIGRAM(S): at 05:14

## 2019-06-06 RX ADMIN — ERYTHROPOIETIN 10000 UNIT(S): 10000 INJECTION, SOLUTION INTRAVENOUS; SUBCUTANEOUS at 13:15

## 2019-06-06 RX ADMIN — POLYETHYLENE GLYCOL 3350 17 GRAM(S): 17 POWDER, FOR SOLUTION ORAL at 13:17

## 2019-06-06 RX ADMIN — Medication 25 MILLIGRAM(S): at 17:49

## 2019-06-06 RX ADMIN — HEPARIN SODIUM 5000 UNIT(S): 5000 INJECTION INTRAVENOUS; SUBCUTANEOUS at 22:14

## 2019-06-06 RX ADMIN — Medication 81 MILLIGRAM(S): at 13:17

## 2019-06-06 RX ADMIN — Medication 25 MICROGRAM(S): at 05:13

## 2019-06-06 RX ADMIN — Medication 100 MILLIGRAM(S): at 05:13

## 2019-06-06 RX ADMIN — LACTULOSE 15 GRAM(S): 10 SOLUTION ORAL at 10:49

## 2019-06-06 RX ADMIN — Medication 25 MILLIGRAM(S): at 09:19

## 2019-06-06 RX ADMIN — HEPARIN SODIUM 5000 UNIT(S): 5000 INJECTION INTRAVENOUS; SUBCUTANEOUS at 13:18

## 2019-06-06 RX ADMIN — Medication 100 MILLIGRAM(S): at 22:14

## 2019-06-06 RX ADMIN — SENNA PLUS 2 TABLET(S): 8.6 TABLET ORAL at 22:13

## 2019-06-06 RX ADMIN — Medication 1 TABLET(S): at 13:17

## 2019-06-06 RX ADMIN — HEPARIN SODIUM 5000 UNIT(S): 5000 INJECTION INTRAVENOUS; SUBCUTANEOUS at 05:13

## 2019-06-06 RX ADMIN — Medication 100 MILLIGRAM(S): at 17:49

## 2019-06-06 NOTE — PROGRESS NOTE ADULT - ASSESSMENT
89F admitted for acute decompensated heart failure, acute diverticulitis, incidental finding of left femoral neck fracture and NAVIN.     PLAN    Acute decompensated heart failure/HFpEF  - improved, c/w O2 supplement    Acute Diverticulitis  - c/w IV cipro and metronidazole    NAVIN/CKD  - slight improvement in cr   - avoid nephrotoxic agent    Left Femoral Neck Fracture  - found on CT, seen by ortho whom recommend MRI however pt is refusing  - WBAT per ortho    - pain control PRN     Palliative Care Encounter  Called and spoke with dtr/HCP Morena to follow up for support. She reports pt was doing well after discharge from [facility] last hospitalization but in recent weeks notable worsening dyspnea and chest discomfort. Dtr spoke with hospice team yesterday and awaiting to hear back regarding approval. D/W hospice team, pt has been approved for home with hospice, awaiting dtr to call back and consent form signature/equipment delivery. Will follow peripherally as pt is comfortable, no further recs for sx management and plan of care has been established.

## 2019-06-06 NOTE — PROGRESS NOTE ADULT - ATTENDING COMMENTS
D/W pt, pt's dtr Morena, hospice team, RN
Discussed patient's care at length with her daughter today.  Patient and her daughter do not want an MRI.  Patient is clostrophobic and reports difficulty breathing in MRIs.  Medically not stable for anesthesia for MRI either.  After long discussion, because the patient had no left hip pain prior to admission, no pain on exam, and daughter notes a history of left hip injury in january with pain that was never imaged with MRI (due to same issues with undergoing study), will treat this as a chronic left femoral neck fracture.  Daughter and patient are aware that MRI would confirm fracture acuity, but they refuse.  Patient may be WBAT with walker.  FU as outpatient for repeat xrays.  If patient develops any pain in left hip please re-image and re-consult.
Epogen today.
Venofer, B-complex; PTH, D levels.
Agree with above assessment and plan  lasix po dose held due to NAVIN- will restart as rpt labs show stable Cr.   On PT eval impairments found; will need Home with RW, Home PT home w/ home PT-> script to be given today to SW by DRAGAN  Resident to f/u with nephrology
Note addended where needed
note addened were needed

## 2019-06-06 NOTE — PROGRESS NOTE ADULT - NSHPATTENDINGPLANDISCUSS_GEN_ALL_CORE
residents svc, patient
Patient RN pgy-1-2
Patient RN pgy-1-2-3
Patient RN pgy-1-2-3 Palliative care
patient RN pgy-1-2-3 CM  hospice palliative care

## 2019-06-06 NOTE — PROGRESS NOTE ADULT - ASSESSMENT
89 yo female with hx of Chronic diastolic CHFEF70%, mild AS, compression fx [id number], hypothyroid, breast cancer s/p lumpectomy and radiation in 2007, gallstone pancreatitis in April 2019 who currently presented to the ED with complaint of chest pressure and SOB  noted with elevated proBNP as well as mod b/l pleural effusions on imaging and admitted with acute on chronic diastolic HF exacerbation. Patient initially receiving IV diuresis switched to po yesterday, known ckd 4 and monitoring creatine on diuresis. Also incidentally found to have acute sigmoid diverticulitis, likely chest and abd pain is also from this, elevated procalcitonin and initial hypothermia, empirically placed on cipro and flagyl with improvement in hypothermia and sx. Hospital course further complicated by noted left femoral neck fracture, for which initially ortho consulted and recommended MRI but the patient is refusing. Given refual and likely injury in January, will be treated as chronic left femoral neck fx and pt will be WBAT. PT recommended home with rolling walker but as per discussion with daughter over the phone, she would like to explore the option of hospice care. Pending dispo.    Acute on chronic diastolic HFPEF- now resolved  -Symptoms resolved now, no more SOB or chest pressure   - elevated probnp   -cardiac enzymes negative x 3  -C/w lasix to 20mg PO, changed from IV on 06/03.  - d/w nephro bc creatine up trend to 2-3 but remains stable at this time; also during prior hospitalization with cr ~2-3 range and renal fxn will c/w monitored on diuresis   -strict i/os, daily weights  -c/w supplemental O2 as needed, will try to titrate off as needed   - Prior recent April 2019 2D echo noted. Only mild AS on report. EF 70%.  -cardio f/u noted and appreciated     Acute nondisplaced subcapital left femoral neck fracture  -pt reports injury in January and  states she has been ambulating with her walker without complications. Likely chronic fracture.  - found on CT abd/pelvis    -X-rays completed and also showed left femoral neck fracture   -Ortho consult noted and appreciated, recommended MRI of the left hip for further info but patient refused the test due to claustrophobia. Also likely chronic fx. Ortho then recommended WBAT with walker.   - PT consulted, recommended home with rolling walker    Acute diverticulitis  - pt is asx tolerating diet well   -c/w ciprofloxacin 200mg iv q24h started on 05/02/19 D#4, dose decreased today due to NAVIN  -c/w metronidazole 500 mg iv q24h started on 05/02/19  D#4  - PPI held due to worsening creatinine as per nephro recommendations   - Note on ct imaging   - pt will need repeat imaging in 4-6 weeks and colonoscopy as an outpatient   -No florastor due to borderline leukopenia    NAVIN on CKD Stage 4  - Creatine stable, somehow improved today: 2.96 from 3.16; prior from April was ~ 2-3  - changed lasix to po recently and d/w nephro will c/w monitoring no need for discontinuing lasix at this time   - hyperkalemia resolved   -avoid nephrotoxic meds  - PPI and lasix held today due to worsening creatinine as per nephro recommendations   -will monitor renal fxn closely on diuresis       Hypertension  - bp stable   -c/w Hydralazine 25 mg po bid    Macrocytic anemia  -Anemia panel noted  - hgb stable ~9     Hypothyroidism  -c/w Synthroid 25 mcg po Qdaily    Severe caloric malnutrition  - DASH diet, taken off today since patient is not in acute exacerbation anymore  - Nutrition consult noted  -c/w  Ensure TID, MVI  - Monitor nutrition status    DVT ppx;  -c/w heparin sq     Advanced Directives: DNR/DNI  - prior MOLST completed  Next of kin: Daughter Morena  Hospice team to reach out to the patients daughter and discussed at length in regards to hospice services and how patient could benefit from intervention .      po: PT consulted, pt is WBAT and at baseline ambulated with a rolling walker. Patient and her daughter are exploring options for hospice, pending complete evaluation. Anticipated discharge in 24-48hours. 89 yo female with hx of Chronic diastolic CHFEF70%, mild AS, compression fx [id number], hypothyroid, breast cancer s/p lumpectomy and radiation in 2007, gallstone pancreatitis in April 2019 who currently presented to the ED with complaint of chest pressure and SOB  noted with elevated proBNP as well as mod b/l pleural effusions on imaging and admitted with acute on chronic diastolic HF exacerbation. Patient initially receiving IV diuresis switched to po yesterday, known ckd 4 and monitoring creatine on diuresis. Also incidentally found to have acute sigmoid diverticulitis, likely chest and abd pain is also from this, elevated procalcitonin and initial hypothermia, empirically placed on cipro and flagyl with improvement in hypothermia and sx. Hospital course further complicated by noted left femoral neck fracture, for which initially ortho consulted and recommended MRI but the patient is refusing. Given refual and likely injury in January, will be treated as chronic left femoral neck fx and pt will be WBAT. PT recommended home with rolling walker but as per discussion with daughter Morena over the phone yesterday, she would like to explore the option of hospice care. Daughter Morena to sign hospice papers.    Acute on chronic diastolic HFPEF- now resolved  -Symptoms resolved now, no more SOB or chest pressure   - elevated probnp   -cardiac enzymes negative x 3  -C/w lasix to 20mg PO, changed from IV on 06/03.  - d/w nephro bc creatine up trend to 2-3 but remains stable at this time; also during prior hospitalization with cr ~2-3 range and renal fxn will c/w monitored on diuresis   -strict i/os, daily weights  -c/w supplemental O2 as needed, will try to titrate off as needed   - Prior recent April 2019 2D echo noted. Only mild AS on report. EF 70%.  -cardio f/u noted and appreciated     Acute nondisplaced subcapital left femoral neck fracture  -pt reports injury in January and  states she has been ambulating with her walker without complications. Likely chronic fracture.  - found on CT abd/pelvis    -X-rays completed and also showed left femoral neck fracture   -Ortho consult noted and appreciated, recommended MRI of the left hip for further info but patient refused the test due to claustrophobia. Also likely chronic fx. Ortho then recommended WBAT with walker.   - PT consulted, recommended home with rolling walker    Acute diverticulitis  - pt is asx tolerating diet well   -c/w ciprofloxacin 200mg iv q24h started on 05/02/19 D#5, dose decreased on 06/05 due to NAVIN  -c/w metronidazole 500 mg iv q24h started on 05/02/19  D#5  - PPI held due to worsening creatinine as per nephro recommendations   - Note on ct imaging   - pt will need repeat imaging in 4-6 weeks and colonoscopy as an outpatient   -No florastor due to borderline leukopenia    NAVIN on CKD Stage 4  - Creatine stable, somehow improved today: 2.96 from 3.16; prior from April was ~ 2-3  - changed lasix to po recently and d/w nephro will c/w monitoring no need for discontinuing lasix at this time   - hyperkalemia resolved   -avoid nephrotoxic meds  - PPI and lasix held today due to worsening creatinine as per nephro recommendations   -will monitor renal fxn closely on diuresis       Hypertension  - bp stable   -c/w Hydralazine 25 mg po bid    Macrocytic anemia  -Anemia panel noted  - hgb stable ~9     Hypothyroidism  -c/w Synthroid 25 mcg po Qdaily    Severe caloric malnutrition  - DASH diet, taken off today since patient is not in acute exacerbation anymore  - Nutrition consult noted  -c/w  Ensure TID, MVI  - Monitor nutrition status    DVT ppx;  -c/w heparin sq     Advanced Directives: DNR/DNI  - prior MOLST completed  Next of kin: Daughter Morena ([phone])   Hospice team to reach out to the patients daughter and discussed at length in regards to hospice services and how patient could benefit from intervention. Called daughter today but no answer... voice mail left      po: PT consulted, pt is WBAT and at baseline ambulated with a rolling walker. Patient and her daughter are exploring options for hospice, pending complete evaluation. Anticipated discharge in 24-48hours. 89 yo female with hx of Chronic diastolic CHFEF70%, mild AS, compression fx [id number], hypothyroid, breast cancer s/p lumpectomy and radiation in 2007, gallstone pancreatitis in April 2019 who currently presented to the ED with complaint of chest pressure and SOB  noted with elevated proBNP as well as mod b/l pleural effusions on imaging and admitted with acute on chronic diastolic HF exacerbation. Patient initially receiving IV diuresis switched to po yesterday, known ckd 4 and monitoring creatine on diuresis. Also incidentally found to have acute sigmoid diverticulitis, likely chest and abd pain is also from this, elevated procalcitonin and initial hypothermia, empirically placed on cipro and flagyl with improvement in hypothermia and sx. Hospital course further complicated by noted left femoral neck fracture, for which initially ortho consulted and recommended MRI but the patient is refusing. Given refual and likely injury in January, will be treated as chronic left femoral neck fx and pt will be WBAT. PT recommended home with rolling walker. Pt accepted to hospice daughter to sign final consents and equipment to be delivered. Anticipated discharge in 24 hours.     Acute on chronic diastolic HFPEF- now resolved  -Symptoms resolved now, no more SOB or chest pressure   - elevated probnp   -cardiac enzymes negative x 3  -C/w lasix to 20mg PO, changed from IV on 06/03.  - d/w nephro bc creatine up trend to 2-3 but remains stable at this time; also during prior hospitalization with cr ~2-3 range and renal fxn will c/w monitored on diuresis   -strict i/os, daily weights  -c/w supplemental O2 as needed, will try to titrate off as needed   - Prior recent April 2019 2D echo noted. Only mild AS on report. EF 70%.  -cardio f/u noted and appreciated     Acute nondisplaced subcapital left femoral neck fracture  -pt reports injury in January and  states she has been ambulating with her walker without complications. Likely chronic fracture.  - found on CT abd/pelvis    -X-rays completed and also showed left femoral neck fracture   -Ortho f/u noted and appreciated, recommended MRI of the left hip for further info but patient refused the test due to claustrophobia. Also likely chronic fx. Ortho then recommended WBAT with walker.   - PT consulted, recommended home with rolling walker    Acute diverticulitis  - pt is asx tolerating diet well   -c/w ciprofloxacin 200mg iv q24h started on 06/02/19 D#5/10, dose decreased on 06/05 due to NAVIN  -c/w metronidazole 500 mg iv q24h started on 06/02/19  D#5/10  - PPI held due to worsening creatinine as per nephro recommendations   - Noted on ct imaging   - pt will need repeat imaging in 4-6 weeks and colonoscopy as an outpatient   -No florastor due to borderline leukopenia    NAVIN on CKD Stage 4  - Creatine stable, somehow improved today: 2.96 from 3.16; prior from April was ~ 2-3  - changed lasix to po recently and d/w nephro will c/w monitoring no need for discontinuing lasix at this time   -avoid nephrotoxic meds  - PPI and lasix held today due to worsening creatinine as per nephro recommendations   -will monitor renal fxn closely on diuresis       Hypertension  - bp stable   -c/w Hydralazine 25 mg po bid    Macrocytic anemia  -Anemia panel noted  - hgb stable ~9     Hypothyroidism  -c/w Synthroid 25 mcg po Qdaily    Severe caloric malnutrition  - DASH diet, taken off today since patient is not in acute exacerbation anymore  - Nutrition consult noted  -c/w  Ensure TID, MVI  - Monitor nutrition status    DVT ppx;  -c/w heparin sq     Advanced Directives: DNR/DNI  - prior MOLST completed  Next of kin: Daughter Morena ([phone])     po: PT consulted, pt is WBAT and at baseline ambulated with a rolling walker. Patient accepted to hospice pending daughter to sign all consents and equipment to be delivered prior to discharge. Anticipated discharge in 24hours. 89 yo female with hx of Chronic diastolic CHFEF70%, mild AS, compression fx [id number], hypothyroid, breast cancer s/p lumpectomy and radiation in 2007, gallstone pancreatitis in April 2019 who currently presented to the ED with complaint of chest pressure and SOB  noted with elevated proBNP as well as mod b/l pleural effusions on imaging and admitted with acute on chronic diastolic HF exacerbation. Patient initially receiving IV diuresis switched to po yesterday, known ckd 4 and monitoring creatine on diuresis. Also incidentally found to have acute sigmoid diverticulitis, likely chest and abd pain is also from this, elevated procalcitonin and initial hypothermia, empirically placed on cipro and flagyl with improvement in hypothermia and sx. Hospital course further complicated by noted left femoral neck fracture, for which initially ortho consulted and recommended MRI but the patient is refusing. Given refual and likely injury in January, will be treated as chronic left femoral neck fx and pt will be WBAT. PT recommended home with rolling walker. Pt accepted to hospice daughter to sign final consents and equipment to be delivered. Anticipated discharge in 24 hours.     Acute on chronic diastolic HFPEF- now resolved  -Symptoms resolved now, no more SOB or chest pressure   - elevated probnp   -cardiac enzymes negative x 3  -C/w lasix to 20mg PO, changed from IV on 06/03.  - d/w nephro bc creatine up trend to 2-3 but remains stable at this time; also during prior hospitalization with cr ~2-3 range and renal fxn will c/w monitored on diuresis   -strict i/os, daily weights  -c/w supplemental O2 as needed, will try to titrate off as needed   - Prior recent April 2019 2D echo noted. Only mild AS on report. EF 70%.  -cardio f/u noted and appreciated     Acute nondisplaced subcapital left femoral neck fracture  -pt reports injury in January and  states she has been ambulating with her walker without complications. Likely chronic fracture.  - found on CT abd/pelvis    -X-rays completed and also showed left femoral neck fracture   -Ortho f/u noted and appreciated, recommended MRI of the left hip for further info but patient refused the test due to claustrophobia. Also likely chronic fx. Ortho then recommended WBAT with walker.   - PT consulted, recommended home with rolling walker    Acute diverticulitis  - pt is asx tolerating diet well   -c/w ciprofloxacin 200mg iv q24h started on 06/02/19 D#5/10, dose decreased on 06/05 due to NAVIN  -c/w metronidazole 500 mg iv q24h started on 06/02/19  D#5/10  - PPI held due to worsening creatinine as per nephro recommendations   - Noted on ct imaging   - pt will need repeat imaging in 4-6 weeks and colonoscopy as an outpatient   -No florastor due to borderline leukopenia    NAVIN on CKD Stage 4  - Creatine stable, somehow improved today: 2.96 from 3.16; prior from April was ~ 2-3  - changed lasix to po recently and d/w nephro will c/w monitoring no need for discontinuing lasix at this time   -avoid nephrotoxic meds  - PPI and lasix held today due to worsening creatinine as per nephro recommendations   -will monitor renal fxn closely on diuresis       Hypertension  - bp stable   -c/w Hydralazine 25 mg po bid    Macrocytic anemia  -Anemia panel noted  - hgb stable ~9     Hypothyroidism  -c/w Synthroid 25 mcg po Qdaily    Severe caloric malnutrition  - DASH diet, taken off today since patient is not in acute exacerbation anymore  - Nutrition consult noted  -c/w  Ensure TID, MVI  - Monitor nutrition status    DVT ppx;  -c/w heparin sq     Advanced Directives: DNR/DNI  - prior MOLST completed  Next of kin: Daughter Morena ([phone])     Dispo: PT consulted, pt is WBAT and at baseline ambulated with a rolling walker. Patient accepted to hospice pending daughter to sign all consents and equipment to be delivered prior to discharge. Anticipated discharge in 24hours.

## 2019-06-06 NOTE — PROGRESS NOTE ADULT - SUBJECTIVE AND OBJECTIVE BOX
FOLLOW UP VISIT    INTERVAL HPI/OVERNIGHT EVENTS:  Pt resting comfortably, no acute complaints. No acute events overnight per RN.    Present Symptoms:     Dyspnea:  No   Nausea/Vomiting: yes earlier this AM  Anxiety:  Yes  Fatigue: Yes    Loss of appetite: Yes    Constipation: yes no bm x 3 days  Pain: no    Review of Systems: Reviewed, All others negative    MEDICATIONS  (STANDING):  aspirin  chewable 81 milliGRAM(s) Oral daily  ciprofloxacin   IVPB 200 milliGRAM(s) IV Intermittent every 12 hours  dextrose 5%. 1000 milliLiter(s) (50 mL/Hr) IV Continuous <Continuous>  dextrose 50% Injectable 12.5 Gram(s) IV Push once  dextrose 50% Injectable 25 Gram(s) IV Push once  dextrose 50% Injectable 25 Gram(s) IV Push once  docusate sodium 100 milliGRAM(s) Oral three times a day  furosemide    Tablet 20 milliGRAM(s) Oral daily  heparin  Injectable 5000 Unit(s) SubCutaneous every 8 hours  hydrALAZINE 25 milliGRAM(s) Oral two times a day  iron sucrose IVPB 100 milliGRAM(s) IV Intermittent every 24 hours  levothyroxine 25 MICROGram(s) Oral daily  metroNIDAZOLE  IVPB 500 milliGRAM(s) IV Intermittent every 12 hours  multivitamin 1 Tablet(s) Oral daily  Nephro-jyotsna 1 Tablet(s) Oral daily  polyethylene glycol 3350 17 Gram(s) Oral daily  senna 2 Tablet(s) Oral at bedtime    MEDICATIONS  (PRN):  acetaminophen   Tablet .. 325 milliGRAM(s) Oral every 4 hours PRN Mild Pain (1 - 3), Moderate Pain (4 - 6)  dextrose 40% Gel 15 Gram(s) Oral once PRN Blood Glucose LESS THAN 70 milliGRAM(s)/deciLiter  glucagon  Injectable 1 milliGRAM(s) IntraMuscular once PRN Glucose <70 milliGRAM(s)/deciLiter  hydrALAZINE Injectable 5 milliGRAM(s) IV Push every 4 hours PRN for SBP > 170 or DBPO > 100      PHYSICAL EXAM:    Vital Signs Last 24 Hrs  T(C): 36.3 (2019 15:38), Max: 36.7 (2019 21:56)  T(F): 97.4 (2019 15:38), Max: 98 (2019 21:56)  HR: 71 (2019 15:38) (62 - 76)  BP: 122/67 (2019 15:38) (99/46 - 143/49)  BP(mean): --  RR: 16 (2019 15:38) (16 - 16)  SpO2: 96% (2019 15:38) (91% - 96%)    General: elderly.   No acute distress.   HEENT: MMM.  bitemporal wasting  Lungs: diminished breath sounds at bases. non-labored. O2NC  CV: +s1/s2. Regular rate and rhythm.  +murmur  GI:+ bowel sound. abdomen soft, non-tender, non-distended.  MSK: Moves all 4 extremities.  No cyanosis or edema. weakness.   Neuro: Awake and alert, oriented x3. Interactive   Skin: warm and dry.     LABS:                          8.8    4.1   )-----------( 153      ( 2019 06:19 )             27.1     06-    140  |  104  |  46.0<H>  ----------------------------<  69<L>  4.2   |  24.0  |  2.98<H>    Ca    8.9      2019 06:19        Urinalysis Basic - ( 2019 20:50 )    Color: Yellow / Appearance: Clear / S.010 / pH: x  Gluc: x / Ketone: Negative  / Bili: Negative / Urobili: Negative mg/dL   Blood: x / Protein: 100 mg/dL / Nitrite: Negative   Leuk Esterase: Negative / RBC: x / WBC x   Sq Epi: x / Non Sq Epi: x / Bacteria: x    RADIOLOGY & ADDITIONAL STUDIES: Reviewed, no new recent studies    ADVANCE DIRECTIVES: DNR/DNI MOLST

## 2019-06-06 NOTE — PROGRESS NOTE ADULT - SUBJECTIVE AND OBJECTIVE BOX
CC: Patient is a 89y old  Female who presents with a chief complaint of acute CHF exacerbation (01 Jun 2019 23:50) improving sob/ colitis and now pending dispo     Overnight/ AM events:   Patient seen and examined at bedside during rounds. Again she says that she feels weak and bad when she receives her medications because she normally takes them at a different time at home and doesn't like to take them with the hospital schedule. Aside from this has not had any more chest pain and feels ready to go home. D/w her and her daughter yesterday that the hospice team will reach out to discuss more in depth.       ROS: Denies fever, abdominal pain, diarrhea, constipation, calf pain., SOB, Chest pain      Vital Signs Last 24 Hrs  T(C): 36.3 (06 Jun 2019 10:26), Max: 36.7 (05 Jun 2019 15:35)  T(F): 97.4 (06 Jun 2019 10:26), Max: 98 (05 Jun 2019 15:35)  HR: 75 (06 Jun 2019 10:26) (62 - 76)  BP: 99/46 (06 Jun 2019 10:26) (99/46 - 148/56)  RR: 16 (06 Jun 2019 10:26) (16 - 16)  SpO2: 91% (06 Jun 2019 10:26) (91% - 96%)    Not on telemonitor    I&O's Summary    05 Jun 2019 07:01  -  06 Jun 2019 07:00  --------------------------------------------------------  IN: 550 mL / OUT: 650 mL / NET: -100 mL    06 Jun 2019 07:01  -  06 Jun 2019 13:18  --------------------------------------------------------  IN: 240 mL / OUT: 0 mL / NET: 240 mL      Physical Exam:   Gen: WD WN NAD  HEENT: NCAT, EOMI, PERRLA  CVS: RRR, +S1/S2, + GIII/VI holosystolic murmur, no rubs or gallops appreciated, chest wall tenderness noted across upper chest  Lungs: CTAB, no wheeze, rales, rhonchi  Abdomen: +BS, soft, ND, NT. No palpable flank tenderness or mass, no CVA tenderness  Ext: No cyanosis, edema, no b/l hip or calf tenderness  Neuro: AAOx3, no focal deficits.    Labs:                          8.8    4.1   )-----------( 153      ( 05 Jun 2019 06:19 )             27.1       06-05    140  |  104  |  46.0<H>  ----------------------------<  69<L>  4.2   |  24.0  |  2.98<H>    Ca    8.9      05 Jun 2019 06:19  Phos  4.9     06-04  Mg     2.5     06-04            Radiology:  < from: CT Abdomen and Pelvis No Cont (06.02.19 @ 03:24) >  IMPRESSION:   1. Mild acute diverticulitis of the proximal sigmoid colon.   2. Moderate bilateral pleural effusions with associated compressive   atelectasis.   3. Acute appearing nondisplaced subcapital left femoral neck fracture.    ******PRELIMINARY REPORT******      < end of copied text >    MEDICATIONS  (STANDING):  aspirin  chewable 81 milliGRAM(s) Oral daily  ciprofloxacin   IVPB 200 milliGRAM(s) IV Intermittent every 12 hours  dextrose 5%. 1000 milliLiter(s) (50 mL/Hr) IV Continuous <Continuous>  dextrose 50% Injectable 12.5 Gram(s) IV Push once  dextrose 50% Injectable 25 Gram(s) IV Push once  dextrose 50% Injectable 25 Gram(s) IV Push once  docusate sodium 100 milliGRAM(s) Oral three times a day  epoetin tara Injectable 21623 Unit(s) SubCutaneous once  furosemide    Tablet 20 milliGRAM(s) Oral daily  heparin  Injectable 5000 Unit(s) SubCutaneous every 8 hours  hydrALAZINE 25 milliGRAM(s) Oral two times a day  iron sucrose IVPB 100 milliGRAM(s) IV Intermittent every 24 hours  levothyroxine 25 MICROGram(s) Oral daily  metroNIDAZOLE  IVPB 500 milliGRAM(s) IV Intermittent every 12 hours  multivitamin 1 Tablet(s) Oral daily  Nephro-jyotsna 1 Tablet(s) Oral daily  polyethylene glycol 3350 17 Gram(s) Oral daily  senna 2 Tablet(s) Oral at bedtime    MEDICATIONS  (PRN):  acetaminophen   Tablet .. 325 milliGRAM(s) Oral every 4 hours PRN Mild Pain (1 - 3), Moderate Pain (4 - 6)  dextrose 40% Gel 15 Gram(s) Oral once PRN Blood Glucose LESS THAN 70 milliGRAM(s)/deciLiter  glucagon  Injectable 1 milliGRAM(s) IntraMuscular once PRN Glucose <70 milliGRAM(s)/deciLiter  hydrALAZINE Injectable 5 milliGRAM(s) IV Push every 4 hours PRN for SBP > 170 or DBPO > 100 CC: Patient is a 89y old  Female who presents with a chief complaint of acute CHF exacerbation (01 Jun 2019 23:50) improving sob/ colitis and now pending dispo     Overnight/ AM events:   Patient seen and examined at bedside during rounds. Again she says that she feels weak and bad when she receives her medications because she normally takes them at a different time at home and doesn't like to take them with the hospital schedule. Aside from this has not had any more chest pain and feels ready to go home. D/w her and her daughter yesterday that the hospice team will reach out to discuss more in depth.       ROS: Denies fever, abdominal pain, diarrhea, constipation, calf pain., SOB, Chest pain      Vital Signs Last 24 Hrs  T(C): 36.3 (06 Jun 2019 10:26), Max: 36.7 (05 Jun 2019 15:35)  T(F): 97.4 (06 Jun 2019 10:26), Max: 98 (05 Jun 2019 15:35)  HR: 75 (06 Jun 2019 10:26) (62 - 76)  BP: 99/46 (06 Jun 2019 10:26) (99/46 - 148/56)  RR: 16 (06 Jun 2019 10:26) (16 - 16)  SpO2: 91% (06 Jun 2019 10:26) (91% - 96%)    Not on telemonitor    I&O's Summary    05 Jun 2019 07:01  -  06 Jun 2019 07:00  --------------------------------------------------------  IN: 550 mL / OUT: 650 mL / NET: -100 mL    06 Jun 2019 07:01  -  06 Jun 2019 13:18  --------------------------------------------------------  IN: 240 mL / OUT: 0 mL / NET: 240 mL      Physical Exam:   Gen: WD WN NAD  HEENT: NCAT, EOMI, PERRLA  CVS: RRR, +S1/S2, + GIII/VI holosystolic murmur, no rubs or gallops appreciated, chest wall tenderness noted across upper chest  Lungs: CTAB, no wheeze, rales, rhonchi  Abdomen: +BS, soft, ND, NT. No palpable flank tenderness or mass, no CVA tenderness  Ext: No cyanosis, edema, no b/l hip or calf tenderness  Neuro: AAOx3, no focal deficits.    Labs:                          8.8    4.1   )-----------( 153      ( 05 Jun 2019 06:19 )             27.1       06-05    140  |  104  |  46.0<H>  ----------------------------<  69<L>  4.2   |  24.0  |  2.98<H>    Ca    8.9      05 Jun 2019 06:19  Phos  4.9     06-04  Mg     2.5     06-04            Radiology:    < from: CT Abdomen and Pelvis No Cont (06.02.19 @ 03:24) >  ABDOMEN and PELVIS:    Intraperitoneal space: Normal. No free air. No significant fluid collection.    Bones/joints: Acute appearing nondisplaced subcapital left femoral neck fracture.    Soft tissues: Unremarkable.    Vasculature: Normal. No abdominal aortic aneurysm.    Lymph nodes: Normal. No enlarged lymph nodes.   IMPRESSION:   1. Mild acute diverticulitis of the proximal sigmoid colon.   2. Moderate bilateral pleural effusions with associated compressive atelectasis.   3. Acute appearing nondisplaced subcapital left femoral neck fracture.    < end of copied text >    MEDICATIONS  (STANDING):  aspirin  chewable 81 milliGRAM(s) Oral daily  ciprofloxacin   IVPB 200 milliGRAM(s) IV Intermittent every 12 hours  dextrose 5%. 1000 milliLiter(s) (50 mL/Hr) IV Continuous <Continuous>  dextrose 50% Injectable 12.5 Gram(s) IV Push once  dextrose 50% Injectable 25 Gram(s) IV Push once  dextrose 50% Injectable 25 Gram(s) IV Push once  docusate sodium 100 milliGRAM(s) Oral three times a day  furosemide    Tablet 20 milliGRAM(s) Oral daily  heparin  Injectable 5000 Unit(s) SubCutaneous every 8 hours  hydrALAZINE 25 milliGRAM(s) Oral two times a day  iron sucrose IVPB 100 milliGRAM(s) IV Intermittent every 24 hours  levothyroxine 25 MICROGram(s) Oral daily  metroNIDAZOLE  IVPB 500 milliGRAM(s) IV Intermittent every 12 hours  multivitamin 1 Tablet(s) Oral daily  Nephro-jyotsna 1 Tablet(s) Oral daily  polyethylene glycol 3350 17 Gram(s) Oral daily  senna 2 Tablet(s) Oral at bedtime    MEDICATIONS  (PRN):  acetaminophen   Tablet .. 325 milliGRAM(s) Oral every 4 hours PRN Mild Pain (1 - 3), Moderate Pain (4 - 6)  dextrose 40% Gel 15 Gram(s) Oral once PRN Blood Glucose LESS THAN 70 milliGRAM(s)/deciLiter  glucagon  Injectable 1 milliGRAM(s) IntraMuscular once PRN Glucose <70 milliGRAM(s)/deciLiter  hydrALAZINE Injectable 5 milliGRAM(s) IV Push every 4 hours PRN for SBP > 170 or DBPO > 100

## 2019-06-06 NOTE — PROGRESS NOTE ADULT - SUBJECTIVE AND OBJECTIVE BOX
Patient seen and examined    Nausea noted. No  symptoms.    Vital Signs Last 24 Hrs  T(C): 36.4 (06 Jun 2019 05:10), Max: 36.7 (05 Jun 2019 15:35)  T(F): 97.6 (06 Jun 2019 05:10), Max: 98 (05 Jun 2019 15:35)  HR: 76 (06 Jun 2019 05:10) (62 - 76)  BP: 137/46 (06 Jun 2019 05:10) (114/45 - 148/56)  BP(mean): --  RR: 16 (06 Jun 2019 05:10) (16 - 16)  SpO2: 96% (06 Jun 2019 05:10) (94% - 96%)  T(C): 36.4 (04 Jun 2019 16:21), Max: 36.5 (03 Jun 2019 21:39)  T(F): 97.5 (04 Jun 2019 16:21), Max: 97.7 (03 Jun 2019 21:39)  HR: 77 (04 Jun 2019 18:28) (65 - 77)  BP: 153/59 (04 Jun 2019 18:28) (114/63 - 168/56)  BP(mean): --  RR: 16 (04 Jun 2019 16:21) (16 - 16)  SpO2: 97% (04 Jun 2019 16:21) (96% - 98%)    PHYSICAL EXAM    GENERAL: cachectic same  EYES:  conjunctiva and sclera clear  NECK: Supple, No JVD/Bruit  NERVOUS SYSTEM:  Alert, verbal oob in chair  CHEST:  No rales, No rhonchi  HEART:  RRR, No murmur  ABDOMEN: Soft, NT/ND BS+  EXTREMITIES:  No Edema;  SKIN: No rashes      Labs :    06-05    140  |  104  |  46.0<H>  ----------------------------<  69<L>  4.2   |  24.0  |  2.98<H>    Ca    8.9      05 Jun 2019 06:19  Phos  4.9     06-04  Mg     2.5     06-04 04 Jun 2019 10:21    139    |  102    |  43.0   ----------------------------<  125    3.7     |  22.0   |  3.05     Ca    9.2        04 Jun 2019 10:21  Phos  4.9       04 Jun 2019 10:21  Mg     2.5       04 Jun 2019 10:21                            9.7    4.5   )-----------( 185      ( 03 Jun 2019 06:18 )             30.0

## 2019-06-07 ENCOUNTER — TRANSCRIPTION ENCOUNTER (OUTPATIENT)
Age: 84
End: 2019-06-07

## 2019-06-07 VITALS
RESPIRATION RATE: 16 BRPM | TEMPERATURE: 98 F | HEART RATE: 78 BPM | DIASTOLIC BLOOD PRESSURE: 53 MMHG | SYSTOLIC BLOOD PRESSURE: 148 MMHG

## 2019-06-07 LAB
24R-OH-CALCIDIOL SERPL-MCNC: 33.3 NG/ML — SIGNIFICANT CHANGE UP (ref 30–80)
CULTURE RESULTS: SIGNIFICANT CHANGE UP
CULTURE RESULTS: SIGNIFICANT CHANGE UP
SPECIMEN SOURCE: SIGNIFICANT CHANGE UP
SPECIMEN SOURCE: SIGNIFICANT CHANGE UP

## 2019-06-07 PROCEDURE — 87040 BLOOD CULTURE FOR BACTERIA: CPT

## 2019-06-07 PROCEDURE — 36415 COLL VENOUS BLD VENIPUNCTURE: CPT

## 2019-06-07 PROCEDURE — 96365 THER/PROPH/DIAG IV INF INIT: CPT

## 2019-06-07 PROCEDURE — 83735 ASSAY OF MAGNESIUM: CPT

## 2019-06-07 PROCEDURE — 93005 ELECTROCARDIOGRAM TRACING: CPT

## 2019-06-07 PROCEDURE — 71045 X-RAY EXAM CHEST 1 VIEW: CPT

## 2019-06-07 PROCEDURE — 81001 URINALYSIS AUTO W/SCOPE: CPT

## 2019-06-07 PROCEDURE — 83540 ASSAY OF IRON: CPT

## 2019-06-07 PROCEDURE — 96375 TX/PRO/DX INJ NEW DRUG ADDON: CPT

## 2019-06-07 PROCEDURE — 83690 ASSAY OF LIPASE: CPT

## 2019-06-07 PROCEDURE — 93970 EXTREMITY STUDY: CPT

## 2019-06-07 PROCEDURE — 85730 THROMBOPLASTIN TIME PARTIAL: CPT

## 2019-06-07 PROCEDURE — 84145 PROCALCITONIN (PCT): CPT

## 2019-06-07 PROCEDURE — 84484 ASSAY OF TROPONIN QUANT: CPT

## 2019-06-07 PROCEDURE — 82607 VITAMIN B-12: CPT

## 2019-06-07 PROCEDURE — 92610 EVALUATE SWALLOWING FUNCTION: CPT

## 2019-06-07 PROCEDURE — 99239 HOSP IP/OBS DSCHRG MGMT >30: CPT

## 2019-06-07 PROCEDURE — 97163 PT EVAL HIGH COMPLEX 45 MIN: CPT

## 2019-06-07 PROCEDURE — 83970 ASSAY OF PARATHORMONE: CPT

## 2019-06-07 PROCEDURE — 82306 VITAMIN D 25 HYDROXY: CPT

## 2019-06-07 PROCEDURE — 94640 AIRWAY INHALATION TREATMENT: CPT

## 2019-06-07 PROCEDURE — 82962 GLUCOSE BLOOD TEST: CPT

## 2019-06-07 PROCEDURE — 80053 COMPREHEN METABOLIC PANEL: CPT

## 2019-06-07 PROCEDURE — 73502 X-RAY EXAM HIP UNI 2-3 VIEWS: CPT

## 2019-06-07 PROCEDURE — 99285 EMERGENCY DEPT VISIT HI MDM: CPT | Mod: 25

## 2019-06-07 PROCEDURE — 83550 IRON BINDING TEST: CPT

## 2019-06-07 PROCEDURE — 72170 X-RAY EXAM OF PELVIS: CPT

## 2019-06-07 PROCEDURE — 82728 ASSAY OF FERRITIN: CPT

## 2019-06-07 PROCEDURE — 84466 ASSAY OF TRANSFERRIN: CPT

## 2019-06-07 PROCEDURE — 82550 ASSAY OF CK (CPK): CPT

## 2019-06-07 PROCEDURE — 83880 ASSAY OF NATRIURETIC PEPTIDE: CPT

## 2019-06-07 PROCEDURE — 84100 ASSAY OF PHOSPHORUS: CPT

## 2019-06-07 PROCEDURE — 87205 SMEAR GRAM STAIN: CPT

## 2019-06-07 PROCEDURE — 92526 ORAL FUNCTION THERAPY: CPT

## 2019-06-07 PROCEDURE — 74176 CT ABD & PELVIS W/O CONTRAST: CPT

## 2019-06-07 PROCEDURE — 83605 ASSAY OF LACTIC ACID: CPT

## 2019-06-07 PROCEDURE — 80048 BASIC METABOLIC PNL TOTAL CA: CPT

## 2019-06-07 PROCEDURE — 82310 ASSAY OF CALCIUM: CPT

## 2019-06-07 PROCEDURE — 85610 PROTHROMBIN TIME: CPT

## 2019-06-07 PROCEDURE — 85027 COMPLETE CBC AUTOMATED: CPT

## 2019-06-07 PROCEDURE — 82746 ASSAY OF FOLIC ACID SERUM: CPT

## 2019-06-07 RX ORDER — SENNA PLUS 8.6 MG/1
2 TABLET ORAL
Qty: 0 | Refills: 0 | DISCHARGE
Start: 2019-06-07

## 2019-06-07 RX ORDER — ACETAMINOPHEN 500 MG
1 TABLET ORAL
Qty: 180 | Refills: 0
Start: 2019-06-07 | End: 2019-07-06

## 2019-06-07 RX ORDER — ACETAMINOPHEN 500 MG
1 TABLET ORAL
Qty: 0 | Refills: 0 | DISCHARGE
Start: 2019-06-07

## 2019-06-07 RX ORDER — HYDRALAZINE HCL 50 MG
1 TABLET ORAL
Qty: 60 | Refills: 0
Start: 2019-06-07 | End: 2021-04-05

## 2019-06-07 RX ORDER — POLYETHYLENE GLYCOL 3350 17 G/17G
17 POWDER, FOR SOLUTION ORAL
Qty: 0 | Refills: 0 | DISCHARGE
Start: 2019-06-07

## 2019-06-07 RX ORDER — DOCUSATE SODIUM 100 MG
1 CAPSULE ORAL
Qty: 90 | Refills: 0
Start: 2019-06-07 | End: 2019-07-06

## 2019-06-07 RX ORDER — FERROUS SULFATE 325(65) MG
1 TABLET ORAL
Qty: 60 | Refills: 0
Start: 2019-06-07 | End: 2019-07-06

## 2019-06-07 RX ORDER — HYDRALAZINE HCL 50 MG
1 TABLET ORAL
Qty: 60 | Refills: 0
Start: 2019-06-07 | End: 2019-07-06

## 2019-06-07 RX ORDER — CIPROFLOXACIN LACTATE 400MG/40ML
1 VIAL (ML) INTRAVENOUS
Qty: 8 | Refills: 0
Start: 2019-06-07 | End: 2019-06-10

## 2019-06-07 RX ORDER — NIFEDIPINE 30 MG
1 TABLET, EXTENDED RELEASE 24 HR ORAL
Qty: 0 | Refills: 0 | DISCHARGE

## 2019-06-07 RX ORDER — SENNA PLUS 8.6 MG/1
2 TABLET ORAL
Qty: 60 | Refills: 0
Start: 2019-06-07 | End: 2019-07-06

## 2019-06-07 RX ORDER — DOCUSATE SODIUM 100 MG
1 CAPSULE ORAL
Qty: 0 | Refills: 0 | DISCHARGE
Start: 2019-06-07

## 2019-06-07 RX ORDER — LEVOTHYROXINE SODIUM 125 MCG
1 TABLET ORAL
Qty: 0 | Refills: 0 | DISCHARGE

## 2019-06-07 RX ORDER — POLYETHYLENE GLYCOL 3350 17 G/17G
17 POWDER, FOR SOLUTION ORAL
Qty: 510 | Refills: 0
Start: 2019-06-07 | End: 2019-07-06

## 2019-06-07 RX ORDER — FUROSEMIDE 40 MG
1 TABLET ORAL
Qty: 0 | Refills: 0 | DISCHARGE
Start: 2019-06-07

## 2019-06-07 RX ORDER — METRONIDAZOLE 500 MG
1 TABLET ORAL
Qty: 8 | Refills: 0
Start: 2019-06-07 | End: 2019-06-10

## 2019-06-07 RX ORDER — FUROSEMIDE 40 MG
1 TABLET ORAL
Qty: 30 | Refills: 0
Start: 2019-06-07 | End: 2019-07-06

## 2019-06-07 RX ADMIN — Medication 100 MILLIGRAM(S): at 05:24

## 2019-06-07 RX ADMIN — Medication 20 MILLIGRAM(S): at 08:57

## 2019-06-07 RX ADMIN — Medication 100 MILLIGRAM(S): at 08:57

## 2019-06-07 RX ADMIN — Medication 25 MICROGRAM(S): at 05:24

## 2019-06-07 RX ADMIN — Medication 25 MILLIGRAM(S): at 08:57

## 2019-06-07 RX ADMIN — POLYETHYLENE GLYCOL 3350 17 GRAM(S): 17 POWDER, FOR SOLUTION ORAL at 11:29

## 2019-06-07 RX ADMIN — Medication 1 TABLET(S): at 11:29

## 2019-06-07 RX ADMIN — Medication 81 MILLIGRAM(S): at 11:29

## 2019-06-07 RX ADMIN — HEPARIN SODIUM 5000 UNIT(S): 5000 INJECTION INTRAVENOUS; SUBCUTANEOUS at 05:24

## 2019-06-07 RX ADMIN — HEPARIN SODIUM 5000 UNIT(S): 5000 INJECTION INTRAVENOUS; SUBCUTANEOUS at 13:03

## 2019-06-07 NOTE — GOALS OF CARE CONVERSATION - PERSONAL ADVANCE DIRECTIVE - CONVERSATION DETAILS
[facility] RN spoke with dtfly Salazar, dtr does not agree with [facility] poc.
[facility]    Met with daughter Sarah at the bedside. Hospice services discussed. All questions and concerns were addressed. Consents explained. Left consents and hospice information with daughters to sign and go through with patient. Pending approval from hospice MD. [facility] to follow up.    Monserrat Vargas RN
Patient approved for home hospice by hospice physician Dr Dill. Telephone call made to patient's daughter offering to meet to sign hospice consents. Awaiting call back. Equipment will need to be sent into the home before discharge. Covering hospice liaison to follow up with family tomorrow. Raza MCQUEEN
Case referred for hospice evaluation. Patient had a scheduled appointment on 06/05/19 to be evaluated by hospice in the community. Spoke with patient in her hospital room. Patient was alert and oriented. Patient states that she had been living at home with her daughter prior to rehab. Patient plans on returning home with her daughter. Telephone call made to daughter offering to meet to discuss hospice services. Awaiting call back. Raza MCQUEEN

## 2019-06-07 NOTE — DISCHARGE NOTE NURSING/CASE MANAGEMENT/SOCIAL WORK - NSDCPEPT PROEDHF_GEN_ALL_CORE
Call primary care provider for follow up after discharge/Activities as tolerated/Low salt diet/Report signs and symptoms to primary care provider/Monitor weight daily

## 2019-06-07 NOTE — DISCHARGE NOTE NURSING/CASE MANAGEMENT/SOCIAL WORK - NSDCDPATPORTLINK_GEN_ALL_CORE
You can access the [facility] Portal, offered by [facility], by registering with the following website: [email or web address]

## 2019-06-07 NOTE — GOALS OF CARE CONVERSATION - PERSONAL ADVANCE DIRECTIVE - NS PRO AD ANY ON CHART
No/family will bring in today
family will bring in today
family will bring in today
family will bring in today/No

## 2019-06-07 NOTE — PROGRESS NOTE ADULT - ASSESSMENT
CKD   CM , Diastolic dysfunction   Watch on the current Lasix     Anemia   Epogen and Venofer     Sig diverticulitis - Cipro / Flagyl

## 2019-06-07 NOTE — PROGRESS NOTE ADULT - SUBJECTIVE AND OBJECTIVE BOX
NEPHROLOGY INTERVAL HPI/OVERNIGHT EVENTS:    Interim noted   Palliative follow up noted     MEDICATIONS  (STANDING):  aspirin  chewable 81 milliGRAM(s) Oral daily  ciprofloxacin   IVPB 200 milliGRAM(s) IV Intermittent every 12 hours  dextrose 5%. 1000 milliLiter(s) (50 mL/Hr) IV Continuous <Continuous>  dextrose 50% Injectable 12.5 Gram(s) IV Push once  dextrose 50% Injectable 25 Gram(s) IV Push once  dextrose 50% Injectable 25 Gram(s) IV Push once  docusate sodium 100 milliGRAM(s) Oral three times a day  furosemide    Tablet 20 milliGRAM(s) Oral daily  heparin  Injectable 5000 Unit(s) SubCutaneous every 8 hours  hydrALAZINE 25 milliGRAM(s) Oral two times a day  iron sucrose IVPB 100 milliGRAM(s) IV Intermittent every 24 hours  levothyroxine 25 MICROGram(s) Oral daily  metroNIDAZOLE  IVPB 500 milliGRAM(s) IV Intermittent every 12 hours  multivitamin 1 Tablet(s) Oral daily  Nephro-jyotsna 1 Tablet(s) Oral daily  polyethylene glycol 3350 17 Gram(s) Oral daily  senna 2 Tablet(s) Oral at bedtime    MEDICATIONS  (PRN):  acetaminophen   Tablet .. 325 milliGRAM(s) Oral every 4 hours PRN Mild Pain (1 - 3), Moderate Pain (4 - 6)  dextrose 40% Gel 15 Gram(s) Oral once PRN Blood Glucose LESS THAN 70 milliGRAM(s)/deciLiter  glucagon  Injectable 1 milliGRAM(s) IntraMuscular once PRN Glucose <70 milliGRAM(s)/deciLiter  hydrALAZINE Injectable 5 milliGRAM(s) IV Push every 4 hours PRN for SBP > 170 or DBPO > 100      Allergies    codeine (Vomiting)  penicillin (Rash; Anaphylaxis)  Sulfacetamide Sodium (Rash)    Intolerances          Vital Signs Last 24 Hrs  T(C): 36.1 (2019 09:42), Max: 36.6 (2019 22:21)  T(F): 97 (2019 09:42), Max: 97.8 (2019 22:21)  HR: 65 (2019 09:42) (65 - 78)  BP: 127/50 (2019 09:42) (116/55 - 163/53)  BP(mean): --  RR: 16 (2019 09:42) (16 - 16)  SpO2: 96% (2019 09:42) (93% - 96%)  Daily     Daily Weight in k.8 (2019 04:21)  I&O's Detail    2019 07:  -  2019 07:00  --------------------------------------------------------  IN:    Oral Fluid: 360 mL  Total IN: 360 mL    OUT:  Total OUT: 0 mL    Total NET: 360 mL      2019 07:  -  2019 13:37  --------------------------------------------------------  IN:    Oral Fluid: 160 mL  Total IN: 160 mL    OUT:    Voided: 150 mL  Total OUT: 150 mL    Total NET: 10 mL        I&O's Summary    2019 07:  -  2019 07:00  --------------------------------------------------------  IN: 360 mL / OUT: 0 mL / NET: 360 mL    2019 07:  -  2019 13:37  --------------------------------------------------------  IN: 160 mL / OUT: 150 mL / NET: 10 mL        PHYSICAL EXAM:  GENERAL: cachectic same  EYES:  conjunctiva and sclera clear  NECK: Supple, No JVD/Bruit  NERVOUS SYSTEM:  Alert, verbal oob in chair  CHEST:  No rales, No rhonchi  HEART:  RRR, No murmur  ABDOMEN: Soft, NT/ND BS+  EXTREMITIES:  No Edema;  SKIN: No rashes                Vitamin D, 25-Hydroxy: 33.3 ng/mL ( @ 19:25)  Intact PTH: 405 pg/mL ( @ 18:50)       EXAM:  CT ABDOMEN AND PELVIS                          PROCEDURE DATE:  2019          INTERPRETATION:  [facility] RADIOLOGIST PRELIMINARY REPORT  Reviewed by ASHLEY Martínez M.D.  EXAM:    CT Abdomen and Pelvis Without Contrast     EXAM DATE/TIME:    2019 3:19 AM     CLINICAL HISTORY:    89 years old, female; Abdominal pain; Generalized     TECHNIQUE:    Imaging protocol: Axial computed tomography images of the abdomen and   pelvis   without contrast. Coronal and sagittal reformatted images were created   and   reviewed.     COMPARISON:    CT ABDOMEN AND PELVIS 7/15/2012 4:22 PM     FINDINGS:    Pleural space: Moderate bilateral pleural effusions with associated   compressive atelectasis.     ABDOMEN:    Liver: Normal. No mass.    Gallbladder and bile ducts: Cholelithiasis. No cholecystitis or biliary   ductal   dilatation.    Pancreas: Normal. No ductal dilation.    Spleen: Normal. No splenomegaly.    Adrenals: Normal. No mass.    Kidneys and ureters: Chronic medical renal disease. Small calcification   in the   right kidney could be a nonobstructing stone or vascular calcification.   No   obstructive uropathy.    Stomach and bowel: Mild acute diverticulitis of the proximal sigmoid   colon. No   bowel wall thickening or intestinal obstruction.    Appendix: Appendix not visualized. No evidence of appendicitis.     PELVIS:    Bladder: Unremarkable as visualized.    Reproductive: Unremarkable as visualized.     ABDOMEN and PELVIS:    Intraperitoneal space: Normal. No free air. No significant fluid   collection.    Bones/joints: Acute appearing nondisplaced subcapital left femoral neck   fracture.    Soft tissues: Unremarkable.    Vasculature: Normal. No abdominal aortic aneurysm.    Lymph nodes: Normal. No enlarged lymph nodes.     IMPRESSION:   1. Mild acute diverticulitis of the proximal sigmoid colon.   2. Moderate bilateral pleural effusions with associated compressive   atelectasis.   3. Acute appearing nondisplaced subcapital left femoral neck fracture.    ECHO 2019    Summary:   1. Technically adequate study.   2. Normal left ventricular internal cavity size.   3.Hyperdynamic global left ventricular systolic function.   4. Left ventricular ejection fraction, by visual estimation, is 70 to   75%.   5. Spectral Doppler shows impaired relaxation pattern of left   ventricular myocardial filling (Grade I diastolicdysfunction).   6. Normal right ventricular size and function.   7. Mild aortic valve stenosis.   8. Mitral valve mean gradient is 3.0 mmHg consistent with mild mitral   stenosis.   9. Moderate mitral annular calcification.  10. The right atrium is normal in size.  11. There is no evidence of pericardial effusion.    MD Mauro Electronically signed on 2019 at 3:46:28 PM                    RADIOLOGY & ADDITIONAL TESTS:

## 2019-06-07 NOTE — DISCHARGE NOTE NURSING/CASE MANAGEMENT/SOCIAL WORK - NSDCFUADDAPPT_GEN_ALL_CORE_FT
Follow up with your primary care doctor (Dr Banda) and [facility] within 1 week of discharge.   Follow up with Nephrology (Dr Leon) within 2 weeks of discharge.  Follow up with Orthopedic Surgery (Dr Garcia) within 2 weeks for repeat xray of the left hip.

## 2019-06-07 NOTE — PROGRESS NOTE ADULT - REASON FOR ADMISSION
acute CHF exacerbation

## 2019-06-07 NOTE — PROGRESS NOTE ADULT - PROVIDER SPECIALTY LIST ADULT
Cardiology
Family Medicine
Family Medicine
Nephrology
Orthopedics
Palliative Care
Family Medicine
Nephrology

## 2019-06-17 ENCOUNTER — APPOINTMENT (OUTPATIENT)
Dept: FAMILY MEDICINE | Facility: CLINIC | Age: 84
End: 2019-06-17

## 2019-06-24 ENCOUNTER — APPOINTMENT (OUTPATIENT)
Age: 84
End: 2019-06-24

## 2019-06-24 DIAGNOSIS — I50.32 CHRONIC DIASTOLIC (CONGESTIVE) HEART FAILURE: ICD-10-CM

## 2019-06-24 DIAGNOSIS — S72.002A FRACTURE OF UNSPECIFIED PART OF NECK OF LEFT FEMUR, INITIAL ENCOUNTER FOR CLOSED FRACTURE: ICD-10-CM

## 2019-06-24 DIAGNOSIS — I35.0 NONRHEUMATIC AORTIC (VALVE) STENOSIS: ICD-10-CM

## 2019-06-24 DIAGNOSIS — K57.32 DIVERTICULITIS OF LARGE INTESTINE W/OUT PERFORATION OR ABSCESS W/OUT BLEEDING: ICD-10-CM

## 2019-06-24 PROBLEM — I50.9 HEART FAILURE, UNSPECIFIED: Chronic | Status: ACTIVE | Noted: 2019-06-02

## 2019-06-25 PROBLEM — I35.0 MILD AORTIC STENOSIS: Status: ACTIVE | Noted: 2019-06-25

## 2019-06-25 PROBLEM — I50.32 CHRONIC DIASTOLIC CONGESTIVE HEART FAILURE: Status: ACTIVE | Noted: 2019-06-25

## 2019-06-25 PROBLEM — S72.002A FRACTURE OF FEMORAL NECK, LEFT: Status: ACTIVE | Noted: 2019-06-25

## 2019-06-25 PROBLEM — K57.32 SIGMOID DIVERTICULITIS: Status: ACTIVE | Noted: 2019-06-25

## 2019-06-25 RX ORDER — MULTIVITAMIN
TABLET ORAL DAILY
Refills: 0 | Status: ACTIVE | COMMUNITY

## 2019-06-25 RX ORDER — NIFEDIPINE 30 MG/1
30 TABLET, EXTENDED RELEASE ORAL
Refills: 0 | Status: DISCONTINUED | COMMUNITY
End: 2019-06-25

## 2019-06-25 RX ORDER — DORZOLAMIDE HYDROCHLORIDE AND TIMOLOL MALEATE 20; 5 MG/ML; MG/ML
SOLUTION/ DROPS OPHTHALMIC
Refills: 0 | Status: DISCONTINUED | COMMUNITY
End: 2019-06-25

## 2019-07-01 ENCOUNTER — LABORATORY RESULT (OUTPATIENT)
Age: 84
End: 2019-07-01

## 2019-07-01 ENCOUNTER — APPOINTMENT (OUTPATIENT)
Dept: FAMILY MEDICINE | Facility: CLINIC | Age: 84
End: 2019-07-01
Payer: MEDICARE

## 2019-07-01 VITALS
SYSTOLIC BLOOD PRESSURE: 126 MMHG | HEIGHT: 56 IN | DIASTOLIC BLOOD PRESSURE: 68 MMHG | BODY MASS INDEX: 17.32 KG/M2 | HEART RATE: 72 BPM | WEIGHT: 77 LBS

## 2019-07-01 DIAGNOSIS — R63.4 ABNORMAL WEIGHT LOSS: ICD-10-CM

## 2019-07-01 DIAGNOSIS — G47.00 INSOMNIA, UNSPECIFIED: ICD-10-CM

## 2019-07-01 PROCEDURE — 99215 OFFICE O/P EST HI 40 MIN: CPT | Mod: 25

## 2019-07-01 PROCEDURE — 36415 COLL VENOUS BLD VENIPUNCTURE: CPT

## 2019-07-01 RX ORDER — NUT.TX.IMPAIRED DIGESTIVE FXN 0.035-1/ML
LIQUID (ML) ORAL
Qty: 30 | Refills: 2 | Status: ACTIVE | COMMUNITY
Start: 2019-07-01 | End: 1900-01-01

## 2019-07-01 NOTE — HISTORY OF PRESENT ILLNESS
[FreeTextEntry2] : Hospital Course: \par Discharge Date	07-Jun-2019 \par Admission Date	01-Jun-2019 22:47 \par Reason for Admission	acute CHF exacerbation \par Medication Reconciliation Status	Admission Reconciliation is Completed \par Discharge Reconciliation is Completed \par \par Hospital Course	 \par 89 yo female with hx of Chronic diastolic CHFEF70%, mild AS, compression fx \par T11-T12, hypertension, hypothyroid, breast cancer s/p lumpectomy and radiation \par in 2007, gallstone pancreatitis in April 2019 who currently presented to the ED \par with complaints of chest pressure and SOB. Noted with elevated proBNP as well \par as mod b/l pleural effusions on imaging and admitted with acute on chronic \par diastolic HF exacerbation. Patient initially receiving IV diuresis switched to \par po yesterday, known ckd 4 and monitoring creatine on diuresis. Also \par incidentally found to have acute sigmoid diverticulitis, likely chest and abd \par pain is also from this, elevated procalcitonin and initial hypothermia, \par empirically placed on cipro and flagyl with improvement in hypothermia and sx \par to finish 4 more days of abx. Hospital course further complicated by noted left \par femoral neck fracture, for which initially ortho consulted and recommended MRI \par but the patient is refusing. Given refual and likely injury in January, will be \par treated as chronic left femoral neck fx and pt will be WBAT. PT recommended \par home with rolling walker but given her debility and extended hospitalization. \par Daughter disagreed with hospice and wanted pt to go to [facility]. \par \par Patient is stable and medically optimized and will be discharged to [facility] \par ([facility]). Patient to complete 4 more days of po cipro and flagyl for acute \par diverticulitis. Follow up with PMD and Cardio within 1 week of discharge. \par Follow up with Nephro in 2 weeks, and with Ortho within 2 weeks for repeat xray \par of left hip. \par \par Vital Signs Last 24 Hrs \par T(C): 36.3 (07 Jun 2019 16:04), Max: 36.6 (06 Jun 2019 22:21) \par T(F): 97.4 (07 Jun 2019 16:04), Max: 97.8 (06 Jun 2019 22:21) \par HR: 64 (07 Jun 2019 16:04) (64 - 78) \par BP: 139/50 (07 Jun 2019 16:04) (116/55 - 163/53) \par BP(mean): -- \par RR: 18 (07 Jun 2019 16:04) (16 - 18) \par SpO2: 96% (07 Jun 2019 09:42) (93% - 96%) \par \par Physical Exam: \par Gen: WD thin NAD \par HEENT: NCAT, EOMI, PERRLA \par CVS: RRR, +S1/S2, +holosystolic murmur, no rubs or gallops appreciated, mild \par chest wall tenderness noted across upper chest \par Lungs: CTAB, no wheeze, rales, rhonchi \par Abdomen: +BS, soft, ND, NT. No palpable flank tenderness or mass, no CVA \par tenderness \par Ext: No cyanosis, edema, no b/l hip or calf tenderness \par Neuro: AAOx3, no focal deficits. \par \par \par Time spent on discharge: 33 mins \par \par \par Care Plan/Procedures: \par Goal(s)	To get better and follow your care plan as instructed. \par Discharge Diagnoses, Assessment and Plan of Treatment	PRINCIPAL DISCHARGE \par DIAGNOSIS \par Diagnosis: Acute on chronic diastolic heart failure \par Assessment and Plan of Treatment: Continue all medications as prescribed, \par including lasix 20mg oral daily. Be sure to follow up with your Primary Care \par Doctor and Cardiologist within 1 week of discharge. Follow a low salt diet. \par Fluid restriction 1 liter. Measure your weight regularly, and if you experience \par weight gain with increased swelling in your legs or shortness of breath with \par exertion, call your doctor. \par \par \par SECONDARY DISCHARGE DIAGNOSES \par Diagnosis: Hypothyroidism \par Assessment and Plan of Treatment: Take your synthroid medication as prescribed. \par \par Diagnosis: Hypertension \par Assessment and Plan of Treatment: We made some changes to your medications. We \par started you on a medication called hydralazine. Also STOP taking Nifedipine. \par Maintain a low salt diet. Check your blood pressure daily and seek medical \par attention if the top number is always above 150. \par \par Diagnosis: Acute kidney injury superimposed on CKD \par Assessment and Plan of Treatment: CKD 3- baseline creatine 2-3 range. Your \par kidney function tests are now stable. Continue drinking plenty of water daily. \par Take all medications as prescribed. Avoid medications like \par ibuprofen/advil/motrin that may worsen your kidney function. Follow up with \par Nephrology within 2 weeks of discharge. \par \par Diagnosis: Fracture of femoral neck, left \par Assessment and Plan of Treatment: This is an old fracture. You may weight bear \par on the left lower extremity as tolerated. Ambulate with rolling walker. Take \par Tylenol as needed for pain. Follow up with Orthopedic surgery within 2 weeks of \par discharge for repeat xray of the left hip. \par \par Diagnosis: Diverticulitis \par Assessment and Plan of Treatment: Continue taking oral antibiotics as \par prescribed for another 4 days (until June 11th). Please seek medical attention \par if having severe diarrhea, fevers or abdominal pain. \par \par 07/2019- here with grand daughter states she is not well and she has lost weight in the hospital\par eating now but not as much\par no chest pain or SOB,\par no hip or abdominal pain\par she has constipation nd takes stool softer or MOM\par doesnot sleep well

## 2019-07-01 NOTE — ASSESSMENT
[FreeTextEntry1] : Hospital Discharge for HTN/AKD,diverticulitis\par anemia doing better then before\par get lab\par advise ensure\par insomnia- start melatonine 1/2 mg daily.\par no hip pain walking with cane\par follow up as needed

## 2019-07-12 LAB
ALBUMIN SERPL ELPH-MCNC: 4.1 G/DL
ALP BLD-CCNC: 70 U/L
ALT SERPL-CCNC: 15 U/L
ANION GAP SERPL CALC-SCNC: 14 MMOL/L
AST SERPL-CCNC: 26 U/L
BASOPHILS # BLD AUTO: 0.01 K/UL
BASOPHILS NFR BLD AUTO: 0.4 %
BILIRUB SERPL-MCNC: 0.2 MG/DL
BUN SERPL-MCNC: 41 MG/DL
CALCIUM SERPL-MCNC: 8.8 MG/DL
CHLORIDE SERPL-SCNC: 105 MMOL/L
CO2 SERPL-SCNC: 22 MMOL/L
CREAT SERPL-MCNC: 2.4 MG/DL
EOSINOPHIL # BLD AUTO: 0.08 K/UL
EOSINOPHIL NFR BLD AUTO: 3.3 %
FERRITIN SERPL-MCNC: 254 NG/ML
FOLATE SERPL-MCNC: 12.9 NG/ML
GLUCOSE SERPL-MCNC: 93 MG/DL
HCT VFR BLD CALC: 29.4 %
HGB BLD-MCNC: 9.1 G/DL
IMM GRANULOCYTES NFR BLD AUTO: 0 %
IRON SATN MFR SERPL: 48 %
IRON SERPL-MCNC: 125 UG/DL
LYMPHOCYTES # BLD AUTO: 1.2 K/UL
LYMPHOCYTES NFR BLD AUTO: 48.8 %
MAN DIFF?: NORMAL
MCHC RBC-ENTMCNC: 31 GM/DL
MCHC RBC-ENTMCNC: 34.6 PG
MCV RBC AUTO: 111.8 FL
MONOCYTES # BLD AUTO: 0.42 K/UL
MONOCYTES NFR BLD AUTO: 17.1 %
NEUTROPHILS # BLD AUTO: 0.75 K/UL
NEUTROPHILS NFR BLD AUTO: 30.4 %
PLATELET # BLD AUTO: 143 K/UL
POTASSIUM SERPL-SCNC: 4.9 MMOL/L
PROT SERPL-MCNC: 6.7 G/DL
RBC # BLD: 2.63 M/UL
RBC # FLD: 13.5 %
SODIUM SERPL-SCNC: 141 MMOL/L
TIBC SERPL-MCNC: 262 UG/DL
TSH SERPL-ACNC: 4.6 UIU/ML
UIBC SERPL-MCNC: 137 UG/DL
VIT B12 SERPL-MCNC: 698 PG/ML
WBC # FLD AUTO: 2.46 K/UL

## 2019-07-29 ENCOUNTER — RX RENEWAL (OUTPATIENT)
Age: 84
End: 2019-07-29

## 2019-09-19 ENCOUNTER — RX RENEWAL (OUTPATIENT)
Age: 84
End: 2019-09-19

## 2019-10-09 ENCOUNTER — APPOINTMENT (OUTPATIENT)
Dept: FAMILY MEDICINE | Facility: CLINIC | Age: 84
End: 2019-10-09
Payer: MEDICARE

## 2019-10-09 VITALS
BODY MASS INDEX: 18 KG/M2 | WEIGHT: 80 LBS | SYSTOLIC BLOOD PRESSURE: 110 MMHG | DIASTOLIC BLOOD PRESSURE: 68 MMHG | HEIGHT: 56 IN | OXYGEN SATURATION: 98 % | HEART RATE: 74 BPM

## 2019-10-09 DIAGNOSIS — M62.838 OTHER MUSCLE SPASM: ICD-10-CM

## 2019-10-09 PROCEDURE — 99214 OFFICE O/P EST MOD 30 MIN: CPT

## 2019-10-14 ENCOUNTER — RX RENEWAL (OUTPATIENT)
Age: 84
End: 2019-10-14

## 2019-10-15 PROBLEM — M62.838 MUSCLE SPASMS OF NECK: Status: ACTIVE | Noted: 2019-10-15

## 2019-10-15 NOTE — PHYSICAL EXAM
[No Acute Distress] : no acute distress [Well Nourished] : well nourished [Well Developed] : well developed [Well-Appearing] : well-appearing [PERRL] : pupils equal round and reactive to light [Normal Sclera/Conjunctiva] : normal sclera/conjunctiva [EOMI] : extraocular movements intact [Normal Outer Ear/Nose] : the outer ears and nose were normal in appearance [Normal Oropharynx] : the oropharynx was normal [No JVD] : no jugular venous distention [No Lymphadenopathy] : no lymphadenopathy [Supple] : supple [Thyroid Normal, No Nodules] : the thyroid was normal and there were no nodules present [No Respiratory Distress] : no respiratory distress  [No Accessory Muscle Use] : no accessory muscle use [Clear to Auscultation] : lungs were clear to auscultation bilaterally [Normal Rate] : normal rate  [Regular Rhythm] : with a regular rhythm [Normal S1, S2] : normal S1 and S2 [No Murmur] : no murmur heard [No Carotid Bruits] : no carotid bruits [No Abdominal Bruit] : a ~M bruit was not heard ~T in the abdomen [No Varicosities] : no varicosities [No Edema] : there was no peripheral edema [Pedal Pulses Present] : the pedal pulses are present [No Palpable Aorta] : no palpable aorta [No Extremity Clubbing/Cyanosis] : no extremity clubbing/cyanosis [Non Tender] : non-tender [Soft] : abdomen soft [Non-distended] : non-distended [No Masses] : no abdominal mass palpated [No HSM] : no HSM [Normal Posterior Cervical Nodes] : no posterior cervical lymphadenopathy [Normal Bowel Sounds] : normal bowel sounds [Normal Anterior Cervical Nodes] : no anterior cervical lymphadenopathy [No CVA Tenderness] : no CVA  tenderness [No Spinal Tenderness] : no spinal tenderness [No Joint Swelling] : no joint swelling [Grossly Normal Strength/Tone] : grossly normal strength/tone [No Rash] : no rash [Coordination Grossly Intact] : coordination grossly intact [No Focal Deficits] : no focal deficits [Normal Gait] : normal gait [Deep Tendon Reflexes (DTR)] : deep tendon reflexes were 2+ and symmetric [Normal Affect] : the affect was normal [de-identified] : muscle spasm [Normal Insight/Judgement] : insight and judgment were intact

## 2019-10-15 NOTE — PLAN
[FreeTextEntry1] : muscle spasm- heat/ Ice may take tylenol\par SOB- possible CHF? anxiety ?- continue current

## 2019-10-23 ENCOUNTER — INBOUND DOCUMENT (OUTPATIENT)
Age: 84
End: 2019-10-23

## 2019-11-02 ENCOUNTER — INPATIENT (INPATIENT)
Facility: HOSPITAL | Age: 84
LOS: 5 days | Discharge: NURSING, REHAB OR LONG-TERM CARE | DRG: 40 | End: 2019-11-08
Attending: HOSPITALIST | Admitting: SURGERY
Payer: MEDICARE

## 2019-11-02 VITALS
RESPIRATION RATE: 18 BRPM | TEMPERATURE: 98 F | HEIGHT: 62 IN | SYSTOLIC BLOOD PRESSURE: 193 MMHG | DIASTOLIC BLOOD PRESSURE: 74 MMHG | OXYGEN SATURATION: 92 % | HEART RATE: 83 BPM | WEIGHT: 110.01 LBS

## 2019-11-02 DIAGNOSIS — Z98.49 CATARACT EXTRACTION STATUS, UNSPECIFIED EYE: Chronic | ICD-10-CM

## 2019-11-02 DIAGNOSIS — Z98.89 OTHER SPECIFIED POSTPROCEDURAL STATES: Chronic | ICD-10-CM

## 2019-11-02 LAB
ALBUMIN SERPL ELPH-MCNC: 4.2 G/DL — SIGNIFICANT CHANGE UP (ref 3.3–5.2)
ALP SERPL-CCNC: 107 U/L — SIGNIFICANT CHANGE UP (ref 40–120)
ALT FLD-CCNC: 35 U/L — HIGH
ANION GAP SERPL CALC-SCNC: 14 MMOL/L — SIGNIFICANT CHANGE UP (ref 5–17)
APTT BLD: 45.5 SEC — HIGH (ref 27.5–36.3)
AST SERPL-CCNC: 40 U/L — HIGH
BILIRUB SERPL-MCNC: 0.2 MG/DL — LOW (ref 0.4–2)
BUN SERPL-MCNC: 66 MG/DL — HIGH (ref 8–20)
CALCIUM SERPL-MCNC: 9.1 MG/DL — SIGNIFICANT CHANGE UP (ref 8.6–10.2)
CHLORIDE SERPL-SCNC: 108 MMOL/L — HIGH (ref 98–107)
CO2 SERPL-SCNC: 22 MMOL/L — SIGNIFICANT CHANGE UP (ref 22–29)
CREAT SERPL-MCNC: 2.55 MG/DL — HIGH (ref 0.5–1.3)
GLUCOSE SERPL-MCNC: 161 MG/DL — HIGH (ref 70–115)
HCT VFR BLD CALC: 25.8 % — LOW (ref 34.5–45)
HGB BLD-MCNC: 8.1 G/DL — LOW (ref 11.5–15.5)
INR BLD: 0.88 RATIO — SIGNIFICANT CHANGE UP (ref 0.88–1.16)
MCHC RBC-ENTMCNC: 31.4 GM/DL — LOW (ref 32–36)
MCHC RBC-ENTMCNC: 32.5 PG — SIGNIFICANT CHANGE UP (ref 27–34)
MCV RBC AUTO: 103.6 FL — HIGH (ref 80–100)
NT-PROBNP SERPL-SCNC: 1865 PG/ML — HIGH (ref 0–300)
PLATELET # BLD AUTO: 163 K/UL — SIGNIFICANT CHANGE UP (ref 150–400)
POTASSIUM SERPL-MCNC: 5.3 MMOL/L — SIGNIFICANT CHANGE UP (ref 3.5–5.3)
POTASSIUM SERPL-SCNC: 5.3 MMOL/L — SIGNIFICANT CHANGE UP (ref 3.5–5.3)
PROT SERPL-MCNC: 7.4 G/DL — SIGNIFICANT CHANGE UP (ref 6.6–8.7)
PROTHROM AB SERPL-ACNC: 10.1 SEC — SIGNIFICANT CHANGE UP (ref 10–12.9)
RBC # BLD: 2.49 M/UL — LOW (ref 3.8–5.2)
RBC # FLD: 15.5 % — HIGH (ref 10.3–14.5)
SODIUM SERPL-SCNC: 144 MMOL/L — SIGNIFICANT CHANGE UP (ref 135–145)
TROPONIN T SERPL-MCNC: 0.01 NG/ML — SIGNIFICANT CHANGE UP (ref 0–0.06)
WBC # BLD: 6.22 K/UL — SIGNIFICANT CHANGE UP (ref 3.8–10.5)
WBC # FLD AUTO: 6.22 K/UL — SIGNIFICANT CHANGE UP (ref 3.8–10.5)

## 2019-11-02 PROCEDURE — 93010 ELECTROCARDIOGRAM REPORT: CPT

## 2019-11-02 PROCEDURE — 67715 CANTHOTOMY: CPT

## 2019-11-02 PROCEDURE — 70486 CT MAXILLOFACIAL W/O DYE: CPT | Mod: 26

## 2019-11-02 PROCEDURE — 72125 CT NECK SPINE W/O DYE: CPT | Mod: 26

## 2019-11-02 PROCEDURE — 71250 CT THORAX DX C-: CPT | Mod: 26

## 2019-11-02 PROCEDURE — 70450 CT HEAD/BRAIN W/O DYE: CPT | Mod: 26

## 2019-11-02 PROCEDURE — 99291 CRITICAL CARE FIRST HOUR: CPT | Mod: 25

## 2019-11-02 PROCEDURE — 71045 X-RAY EXAM CHEST 1 VIEW: CPT | Mod: 26

## 2019-11-02 RX ORDER — TIMOLOL 0.5 %
1 DROPS OPHTHALMIC (EYE) ONCE
Refills: 0 | Status: COMPLETED | OUTPATIENT
Start: 2019-11-02 | End: 2019-11-02

## 2019-11-02 RX ORDER — TIMOLOL 0.5 %
1 DROPS OPHTHALMIC (EYE) EVERY 12 HOURS
Refills: 0 | Status: DISCONTINUED | OUTPATIENT
Start: 2019-11-02 | End: 2019-11-08

## 2019-11-02 RX ORDER — BRIMONIDINE TARTRATE 2 MG/MG
1 SOLUTION/ DROPS OPHTHALMIC ONCE
Refills: 0 | Status: COMPLETED | OUTPATIENT
Start: 2019-11-02 | End: 2019-11-02

## 2019-11-02 RX ORDER — ERYTHROMYCIN BASE 5 MG/GRAM
1 OINTMENT (GRAM) OPHTHALMIC (EYE) ONCE
Refills: 0 | Status: COMPLETED | OUTPATIENT
Start: 2019-11-02 | End: 2019-11-02

## 2019-11-02 RX ORDER — BRIMONIDINE TARTRATE 2 MG/MG
1 SOLUTION/ DROPS OPHTHALMIC EVERY 8 HOURS
Refills: 0 | Status: DISCONTINUED | OUTPATIENT
Start: 2019-11-02 | End: 2019-11-08

## 2019-11-02 RX ORDER — LATANOPROST 0.05 MG/ML
1 SOLUTION/ DROPS OPHTHALMIC; TOPICAL ONCE
Refills: 0 | Status: COMPLETED | OUTPATIENT
Start: 2019-11-02 | End: 2019-11-02

## 2019-11-02 RX ORDER — LATANOPROST 0.05 MG/ML
1 SOLUTION/ DROPS OPHTHALMIC; TOPICAL AT BEDTIME
Refills: 0 | Status: DISCONTINUED | OUTPATIENT
Start: 2019-11-02 | End: 2019-11-08

## 2019-11-02 RX ORDER — TETANUS TOXOID, REDUCED DIPHTHERIA TOXOID AND ACELLULAR PERTUSSIS VACCINE, ADSORBED 5; 2.5; 8; 8; 2.5 [IU]/.5ML; [IU]/.5ML; UG/.5ML; UG/.5ML; UG/.5ML
0.5 SUSPENSION INTRAMUSCULAR ONCE
Refills: 0 | Status: COMPLETED | OUTPATIENT
Start: 2019-11-02 | End: 2019-11-03

## 2019-11-02 RX ORDER — OXYMETAZOLINE HYDROCHLORIDE 0.5 MG/ML
2 SPRAY NASAL ONCE
Refills: 0 | Status: DISCONTINUED | OUTPATIENT
Start: 2019-11-02 | End: 2019-11-02

## 2019-11-02 RX ADMIN — LATANOPROST 1 DROP(S): 0.05 SOLUTION/ DROPS OPHTHALMIC; TOPICAL at 17:55

## 2019-11-02 RX ADMIN — BRIMONIDINE TARTRATE 1 DROP(S): 2 SOLUTION/ DROPS OPHTHALMIC at 17:27

## 2019-11-02 RX ADMIN — Medication 1 DROP(S): at 17:27

## 2019-11-02 RX ADMIN — Medication 1 DROP(S): at 20:30

## 2019-11-02 NOTE — ED ADULT TRIAGE NOTE - CHIEF COMPLAINT QUOTE
Patient arrived via EMS, awake alert, and oriented times 3, breathing unlabored.  As per EMS, patient stood up, and fell, hitting head.  No LOC.  Patient on 81mg ASA which she did not take today.  Bruising and swelling noted to right side of face and right eye.  Blood noted to bilateral nares.  Dr. Zhang called to bedside priority CT called.

## 2019-11-02 NOTE — ED PROVIDER NOTE - CLINICAL SUMMARY MEDICAL DECISION MAKING FREE TEXT BOX
patient with facial trauma, unclear etiology of fall, priority CT head/maxfac, concerned about rt eye- patient states vision poor at baseline due to surgery that was not properly performed. will reasses for retroorbital hematoma. O2 92% no chest wall ttp or deformities, will CTA for PE as well as rib fx. portable CXR. TBA

## 2019-11-02 NOTE — ED PROVIDER NOTE - PMH
Breast cancer  s/p RT lumpectomy and radiation  Chronic CHF    Glaucoma    HTN (hypertension)    Hypothyroid

## 2019-11-02 NOTE — H&P ADULT - NSHPPHYSICALEXAM_GEN_ALL_CORE
Constitutional: Well-developed well nourished Female in no acute distress  HEENT: Head is normocephalic and atraumatic, maxillofacial structures stable, right lateral scleral hemorrhage, right-sided periorbital ecchymosis and swelling. Left eye EOMI, right eye with difficult ocular eye movement due to swelling.  b/l, pupils 3 mm round and reactive to light b/l, no active drainage  Respiratory: Breath sounds CTA b/l respirations are unlabored, no accessory muscle use, no conversational dyspnea  Cardiovascular: Regular rate & rhythm, +S1, S2  Chest: Chest wall is non-tender to palpation, no subQ emphysema or crepitus palpated  Gastrointestinal: Abdomen soft, non-tender, non-distended, no rebound tenderness / guarding, no ecchymosis or external signs of abdominal trauma  Extremities: moving all extremities spontaneously, no point tenderness or deformity noted to upper or lower extremities b/l  Pelvis: stable  Vascular: 2+ radial, femoral, and DP pulses b/l  Neurological: GCS: 15 (4/5/6). A&O x 3; no gross sensory / motor / coordination deficits  Musculoskeletal: 5/5 strength of upper and lower extremities b/l  Back: no C/T/LS spine tenderness to palpation, no step-offs or signs of external trauma to the back

## 2019-11-02 NOTE — ED PROVIDER NOTE - OBJECTIVE STATEMENT
89yo F with CHF, macular degenration with fall today, she is unsure what happened, daughter came upstairs and she was sitting in bed, blood and swelling to rt side of face. acting appropriate denies LOC. denies CP/SOB before but complaint of SOB after fall. no CP. no focal weakness. no abd pain. no neck pain. no HA. on ASA only 89yo F with CHF, macular degenration with fall today, she is unsure what happened, daughter came upstairs and she was sitting in bed, blood and swelling to rt side of face. acting appropriate denies LOC. denies CP/SOB before but complaint of SOB after fall. no CP. no focal weakness. no abd pain. no neck pain. no HA. on ASA only. daughter present- states she may have stood up too quickly but not sure. was acting fine this morning. no recent illness. no recent travel

## 2019-11-02 NOTE — ED ADULT NURSE REASSESSMENT NOTE - NS ED NURSE REASSESS COMMENT FT1
Dr. Brooklyn mccormick in the department, patient reports her vision has improved since procedure by Emergency Department MD.

## 2019-11-02 NOTE — ED ADULT NURSE REASSESSMENT NOTE - NS ED NURSE REASSESS COMMENT FT1
Report given to night RN , patient awake and alert, resps even and unlabored, in no apparent distress.  Plan, abnormal labs, history of present illness, pending labs/tests explained, opportunity to answer questions provided.  Optho in the department to see patient, patient awake and alert.

## 2019-11-02 NOTE — ED PROCEDURE NOTE - PROCEDURE ADDITIONAL DETAILS
lido with epi injection lateral canthos, hemostasis achieved, using suture scissors, lateral aspect of orbital tissue cut until tendon released

## 2019-11-02 NOTE — H&P ADULT - NSICDXPASTMEDICALHX_GEN_ALL_CORE_FT
PAST MEDICAL HISTORY:  Breast cancer s/p RT lumpectomy and radiation    Chronic CHF     Glaucoma     HTN (hypertension)     Hypothyroid

## 2019-11-02 NOTE — H&P ADULT - HISTORY OF PRESENT ILLNESS
89 year old female with PMH significant for chronic CHF, Glaucoma, HTN and hypothyroidism presented [facility] ED after sustaining a ground level fall earlier today. Patient states that she was getting up from her bed when she felt weak, loss her balance and fell. Patient could not provide further details regarding the incident. Patient taking ASA-81mg daily. Upon further workup, CT head and maxillofacial revealed comminuted fracture of right lateral orbital wall, hemorrhage within retrobulbar lateral extraconal space of right orbit as well as w nondisplaced fracture of right lateral maxillary wall.     While in the ED it was noted that the patient had increased IOP, Ophthalmology recommended lateral canthotomy and several eye drops to aid in reducing IOP. Patient with no major complaints other than right periorbital pain, mild headache but denies nausea nor emesis.

## 2019-11-02 NOTE — ED PROVIDER NOTE - PHYSICAL EXAMINATION
Gen: NAD, AOx3  Head: NC, +hematoma rt lateral periorbital hematoma, mild proptosis  HEENT: Lt pupil reactive EOMI, rt eye exam limited due to swelling, pupil nonreactive slightly firm, minimal EOM, oral mucosa moist, normal conjunctiva, neck supple, +epistaxis  Lung: faint wheeze b/l, no respiratory distress  CV: rrr, no murmur, Normal perfusion  Abd: soft, NTND  MSK: No edema, no visible deformities, no midline spine ttp, c-collar in place, FROM UE/LE, pelvis table  Neuro: No focal neurologic deficits  Skin: abrasion rt side of face  Psych: normal affect

## 2019-11-02 NOTE — H&P ADULT - PROBLEM SELECTOR PLAN 2
Admit to trauma service  pain control  plastic surgery consult for fracture management  optho consult

## 2019-11-02 NOTE — ED PROVIDER NOTE - CARE PLAN
Principal Discharge DX:	Orbital fracture, open, initial encounter  Secondary Diagnosis:	Retrobulbar hemorrhage  Secondary Diagnosis:	Hypoxia  Secondary Diagnosis:	Shortness of breath at rest

## 2019-11-02 NOTE — H&P ADULT - ATTENDING COMMENTS
Agree with above assessment.  The patient was seen and examined by me.  The patient was brought in following a fall hitting the right side of head.  She has pain and swelling of the right periorbital area.  The patient denies LOC, chest pain or SOB.  HEENT with right periorbital swelling and tenderness with ecchymosis, there is tenderness to palpation of the right face, no nasal tenderness, left periorbital area unremarkable, trachea midline, no JVD, chest bilateral air entry, abdomen is soft and non tender, no guarding, no rebound, no pelvic tenderness, no gross deformity of the extremities, Head CT scan reveals a right orbital fracture with a right maxillary sinus fracture with periorbital contusion and retrobulbar hematoma. The patient is to be evaluated by optho, plastic surgery for fracture management, pain control, admit to the trauma service.

## 2019-11-02 NOTE — ED ADULT TRIAGE NOTE - HEART RATE (BEATS/MIN)
Progress Note      Pt Name: Jimena Unger  YOB: 1943  MRN: [id number]    Date of evaluation: 9/27/2019  History Obtained From:  patient, electronic medical record    CHIEF COMPLAINT:    Chief Complaint   Patient presents with    Anemia     Iron deficiency    Fatigue     HISTORY OF PRESENT ILLNESS:    Jimena Unger is a 68 y.o.  female with significant PMH iron deficiency anemia with history of acute GI blood loss and gastric banding (2009). She also has a history of acute gastritis with ulcer and GERD and is presently being followed by Dr Kourtney Ayala. Unfortunately, Paula Chou does not respond well to oral iron replacement, suspect secondary to malabsorption from gastric banding. She required IV iron replacement May 2019 and is presently taking iron once daily. She returns today in routine follow-up with complaints of increased fatigue otherwise indicates she is doing okay. Paula Chou denies any bleeding tendencies specifically melena or hematochezia. She reports that she is scheduled to have a repeat endoscopy with Dr. Kourtney Ayala in October. She is tolerating oral iron replacement without significant difficulty. Iron substrates were last evaluated on 7/11/2019 with a ferritin of 88, TIBC 313, iron 47 and iron saturation 15%. CBC today is stable with a hemoglobin of 10.6 and MCV of 97.4. ONCOLOGIC HISTORY:   Diagnosis  Iron deficiency anemia, April 2019   GI blood loss, April 2019    Treatment summary  PO iron replacement w/o improvement   May 2019 IV Venofer 1000 mg        Hematology history  Ms Delmer Hernandez was seen in initial hematology consultation by Ginette Thomas on 4/30/2019, referred for further workup of anemia. She was referred from [facility]. Of note, the patient has been seen here clinic in the past for iron deficiency anemia and was lost to follow-up. She has a long term history of anemia.  Iron replacement has worked in the past. She had recent iron studies performed which showed low iron levels and and low iron saturation. Unfortunately no ferritin was performed. No B12 or folate was assessed as well. Of note, the patient underwent CABG with a triple bypass back in January 2019. At one point during her hospitalization her hemoglobin was down to 7 and she received 2 units of PRBCs during her hospitalization, January 2019. She complains of ongoing fatigue. She denies any blood loss. She denies any hematochezia, melena. She denies any hematuria. She denies any vaginal bleeding. She has been taking iron supplement for the last month without any major improvement. Laboratory workup: 4/30/2019 showed FOBT +1 out of 3. Ferritin 15, iron saturation 9%. Folate 22, B12 550, Zinc 69, copper 100,  (normal), haptoglobin 260 (H), reticulocyte count 1.6%. The overall patient was consistent with iron deficiency anemia with concern for GI blood loss. May 2019 received IV Venofer at 1000 mg.    5/30/2019 endoscopy and colonoscopy by Dr. Gisselle Monroe. 7/18/2019 by Dr. Gisselle Monroe revealed acute gastritis with ulcer and GERD with hiatal hernia.     Past Medical History:    Past Medical History:   Diagnosis Date    Achilles tendon tear     Left    Anemia, iron deficiency     Asthma     Body mass index (BMI) 45.0-49.9, adult (HCC)     CAD (coronary artery disease)     DM (diabetes mellitus), type 2 ([address]     DVT (deep venous thrombosis) [facility]) 04/2014    BLEs-following MSSA inf-placed on coumadin    GERD (gastroesophageal reflux disease) 09/06/2011    Gastritis-Dr [place] per EGD    Hepatitis C     Dr Leonel Celis w/Intron and Ribavirin    History of blood transfusion     History of GI bleed 04/2014    Coumadin d/c w/temp IVC filter placed 5/3/14    HTN (hypertension)     Hyperlipidemia     Morbid obesity ([address]     Obesity     Osteoarthritis     Peripheral neuropathy     Septicemia due to methicillin resistant Staphylococcus aureus ([address] 04/2014    Rt knee arthroplasty-treated appears well-developed and well-nourished. No distress. HENT:   Head: Normocephalic and atraumatic. Mouth/Throat: Uvula is midline, oropharynx is clear and moist and mucous membranes are normal. No tonsillar exudate. Eyes: Pupils are equal, round, and reactive to light. Conjunctivae, EOM and lids are normal. Right eye exhibits no discharge. Left eye exhibits no discharge. No scleral icterus. Neck: Trachea normal and normal range of motion. Neck supple. No JVD present. No tracheal deviation present. No thyroid mass and no thyromegaly present. Cardiovascular: Normal rate, regular rhythm, normal heart sounds and intact distal pulses. Exam reveals no gallop and no friction rub. No murmur heard. Pulmonary/Chest: Effort normal and breath sounds normal. No respiratory distress. She has no wheezes. She has no rales. She exhibits no tenderness. Abdominal: Soft. Bowel sounds are normal. She exhibits no distension and no mass. There is no tenderness. There is no rebound and no guarding. No hernia. Musculoskeletal: She exhibits no edema, tenderness or deformity. Range of motion within normal limits x4 extremities   Neurological: She is alert and oriented to person, place, and time. No cranial nerve deficit. Coordination normal.   Skin: Skin is warm. No rash noted. She is not diaphoretic. No erythema. No pallor. Psychiatric: She has a normal mood and affect. Her behavior is normal. Thought content normal.   Vitals reviewed. Labs:  CBC: WBC 8.5, ANC 4.33, hemoglobin 10.6, MCV 97.4 and platelet count of 753,750. ASSESSMENT/PLAN:      1. Iron deficiency anemia with a history of blood loss and gastric banding  Hemoglobin is stable at 10.6. Tolerating oral iron replacement. - Comprehensive Metabolic Panel; Future  - Ferritin; Future  - Iron and TIBC; Future  - Vitamin B12; Future         FOLLOW UP:  1. Follow-up appointment given for 3 months  2.  Follow-up as scheduled with Dr. Gisselle Monroe for repeat endoscopy  3.  Follow-up with other medical providers as recommended      Electronically signed by OTTO Broderick on 9/27/2019 at 1:00 PM 83

## 2019-11-02 NOTE — H&P ADULT - NSHPLABSRESULTS_GEN_ALL_CORE
CT head and maxillofacial revealed comminuted fracture of right lateral orbital wall, hemorrhage within retrobulbar lateral extraconal space of right orbit as well as w nondisplaced fracture of right lateral maxillary wall.

## 2019-11-02 NOTE — ED ADULT NURSE REASSESSMENT NOTE - NS ED NURSE REASSESS COMMENT FT1
assumed care at this time, pt in no acute distress. Breathing is even and unlabored on 5L NC. Pt will desat to 90 on Room air. Bruising and hematoma to right eye, pt reports pain, but tolerable. Eye drops administered as per order. Family at bedside. Pt awaiting Trauma consult.

## 2019-11-02 NOTE — H&P ADULT - ASSESSMENT
89 year old female s/p a ground level fall earlier today sustaining maxillary fractures to right lateral orbital wall, increased IOP s/p right lateral canthotomy as well as retrobulbar hemorrhage of the right orbit.    -Admit to Trauma monitored bed  -Continue Timolol, brimondine and latanoprost per Ophtho  -NPO, IVFs  -Pain control  -ice to right ice to help with reduction of swelling  -Monitor Cr for NAVIN  -SCD  -PT/OT

## 2019-11-02 NOTE — ED PROVIDER NOTE - PROGRESS NOTE DETAILS
IOP in 50s in rt eye, can only see lights cannot see figures, spoke with Dr. Brooklyn rosas, will perform lateral canthotomy, after canthotomy repeat pressures 41/36/37, will order IOP lowering gtts -Vicente HOLDEN vision improved, can now see people out of right eye. patient with CKD cannot do CTA, will get CT noncon for trauma and plan for V/Q scan. -Vicente DO spoke with trauma resident again Bernard, will see patient shortly. patient remains stable. will repeat head CT prior to full A/C -Vicente DO spoke with trauma resident again Bernard, will see patient shortly. patient remains stable. no neuro deficits, sight back to baseline in rt eye still with limited EOMI -Slowey DO discussed with trauma team need for possible A/C due to PE concern, they will discuss with surgical attending. -Vicente HOLDEN

## 2019-11-02 NOTE — ED PROVIDER NOTE - NS ED ROS FT
ROS: no CP +SOB. no cough. no fever. no n/v/d/c. no abd pain. no rash. +bleeding. no urinary complaints. no weakness. no vision changes. no HA. no neck/back pain. no extremity swelling/deformity. No change in mental status.

## 2019-11-03 DIAGNOSIS — S05.11XA CONTUSION OF EYEBALL AND ORBITAL TISSUES, RIGHT EYE, INITIAL ENCOUNTER: ICD-10-CM

## 2019-11-03 DIAGNOSIS — S02.85XB: ICD-10-CM

## 2019-11-03 DIAGNOSIS — S02.40CA MAXILLARY FRACTURE, RIGHT SIDE, INITIAL ENCOUNTER FOR CLOSED FRACTURE: ICD-10-CM
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
## 2019-11-03 LAB
ABO RH CONFIRMATION: SIGNIFICANT CHANGE UP
ANION GAP SERPL CALC-SCNC: 11 MMOL/L — SIGNIFICANT CHANGE UP (ref 5–17)
BASE EXCESS BLDA CALC-SCNC: -1.1 MMOL/L — SIGNIFICANT CHANGE UP (ref -2–2)
BASOPHILS # BLD AUTO: 0.02 K/UL — SIGNIFICANT CHANGE UP (ref 0–0.2)
BASOPHILS NFR BLD AUTO: 0.4 % — SIGNIFICANT CHANGE UP (ref 0–2)
BLD GP AB SCN SERPL QL: SIGNIFICANT CHANGE UP
BUN SERPL-MCNC: 68 MG/DL — HIGH (ref 8–20)
CALCIUM SERPL-MCNC: 8.7 MG/DL — SIGNIFICANT CHANGE UP (ref 8.6–10.2)
CHLORIDE SERPL-SCNC: 114 MMOL/L — HIGH (ref 98–107)
CO2 SERPL-SCNC: 22 MMOL/L — SIGNIFICANT CHANGE UP (ref 22–29)
CREAT SERPL-MCNC: 2.17 MG/DL — HIGH (ref 0.5–1.3)
EOSINOPHIL # BLD AUTO: 0.02 K/UL — SIGNIFICANT CHANGE UP (ref 0–0.5)
EOSINOPHIL NFR BLD AUTO: 0.4 % — SIGNIFICANT CHANGE UP (ref 0–6)
GAS PNL BLDA: SIGNIFICANT CHANGE UP
GLUCOSE SERPL-MCNC: 73 MG/DL — SIGNIFICANT CHANGE UP (ref 70–115)
HCO3 BLDA-SCNC: 24 MMOL/L — SIGNIFICANT CHANGE UP (ref 20–26)
HCT VFR BLD CALC: 20.7 % — CRITICAL LOW (ref 34.5–45)
HCT VFR BLD CALC: 32.4 % — LOW (ref 34.5–45)
HGB BLD-MCNC: 10.4 G/DL — LOW (ref 11.5–15.5)
HGB BLD-MCNC: 6.7 G/DL — CRITICAL LOW (ref 11.5–15.5)
IMM GRANULOCYTES NFR BLD AUTO: 0.2 % — SIGNIFICANT CHANGE UP (ref 0–1.5)
LYMPHOCYTES # BLD AUTO: 0.74 K/UL — LOW (ref 1–3.3)
LYMPHOCYTES # BLD AUTO: 16 % — SIGNIFICANT CHANGE UP (ref 13–44)
MAGNESIUM SERPL-MCNC: 2.7 MG/DL — HIGH (ref 1.8–2.6)
MCHC RBC-ENTMCNC: 31.3 PG — SIGNIFICANT CHANGE UP (ref 27–34)
MCHC RBC-ENTMCNC: 32.1 GM/DL — SIGNIFICANT CHANGE UP (ref 32–36)
MCHC RBC-ENTMCNC: 32.4 GM/DL — SIGNIFICANT CHANGE UP (ref 32–36)
MCHC RBC-ENTMCNC: 33.7 PG — SIGNIFICANT CHANGE UP (ref 27–34)
MCV RBC AUTO: 104 FL — HIGH (ref 80–100)
MCV RBC AUTO: 97.6 FL — SIGNIFICANT CHANGE UP (ref 80–100)
MONOCYTES # BLD AUTO: 0.53 K/UL — SIGNIFICANT CHANGE UP (ref 0–0.9)
MONOCYTES NFR BLD AUTO: 11.5 % — SIGNIFICANT CHANGE UP (ref 2–14)
NEUTROPHILS # BLD AUTO: 3.3 K/UL — SIGNIFICANT CHANGE UP (ref 1.8–7.4)
NEUTROPHILS NFR BLD AUTO: 71.5 % — SIGNIFICANT CHANGE UP (ref 43–77)
PCO2 BLDA: 42 MMHG — SIGNIFICANT CHANGE UP (ref 35–45)
PH BLDA: 7.37 — SIGNIFICANT CHANGE UP (ref 7.35–7.45)
PHOSPHATE SERPL-MCNC: 4.9 MG/DL — HIGH (ref 2.4–4.7)
PLATELET # BLD AUTO: 106 K/UL — LOW (ref 150–400)
PLATELET # BLD AUTO: 116 K/UL — LOW (ref 150–400)
PO2 BLDA: 82 MMHG — LOW (ref 83–108)
POTASSIUM SERPL-MCNC: 5.5 MMOL/L — HIGH (ref 3.5–5.3)
POTASSIUM SERPL-SCNC: 5.5 MMOL/L — HIGH (ref 3.5–5.3)
RBC # BLD: 1.99 M/UL — LOW (ref 3.8–5.2)
RBC # BLD: 3.32 M/UL — LOW (ref 3.8–5.2)
RBC # FLD: 15.3 % — HIGH (ref 10.3–14.5)
RBC # FLD: 17.6 % — HIGH (ref 10.3–14.5)
SAO2 % BLDA: 96 % — SIGNIFICANT CHANGE UP (ref 95–99)
SODIUM SERPL-SCNC: 147 MMOL/L — HIGH (ref 135–145)
WBC # BLD: 4.62 K/UL — SIGNIFICANT CHANGE UP (ref 3.8–10.5)
WBC # BLD: 5.19 K/UL — SIGNIFICANT CHANGE UP (ref 3.8–10.5)
WBC # FLD AUTO: 4.62 K/UL — SIGNIFICANT CHANGE UP (ref 3.8–10.5)
WBC # FLD AUTO: 5.19 K/UL — SIGNIFICANT CHANGE UP (ref 3.8–10.5)

## 2019-11-03 PROCEDURE — 99222 1ST HOSP IP/OBS MODERATE 55: CPT | Mod: AI

## 2019-11-03 PROCEDURE — 99232 SBSQ HOSP IP/OBS MODERATE 35: CPT

## 2019-11-03 RX ORDER — SODIUM CHLORIDE 9 MG/ML
1000 INJECTION INTRAMUSCULAR; INTRAVENOUS; SUBCUTANEOUS
Refills: 0 | Status: DISCONTINUED | OUTPATIENT
Start: 2019-11-03 | End: 2019-11-04

## 2019-11-03 RX ORDER — ACETYLCYSTEINE 200 MG/ML
4 VIAL (ML) MISCELLANEOUS THREE TIMES A DAY
Refills: 0 | Status: COMPLETED | OUTPATIENT
Start: 2019-11-03 | End: 2019-11-04

## 2019-11-03 RX ORDER — SENNA PLUS 8.6 MG/1
2 TABLET ORAL AT BEDTIME
Refills: 0 | Status: DISCONTINUED | OUTPATIENT
Start: 2019-11-03 | End: 2019-11-08

## 2019-11-03 RX ORDER — DOCUSATE SODIUM 100 MG
100 CAPSULE ORAL THREE TIMES A DAY
Refills: 0 | Status: DISCONTINUED | OUTPATIENT
Start: 2019-11-03 | End: 2019-11-08

## 2019-11-03 RX ORDER — INFLUENZA VIRUS VACCINE 15; 15; 15; 15 UG/.5ML; UG/.5ML; UG/.5ML; UG/.5ML
0.5 SUSPENSION INTRAMUSCULAR ONCE
Refills: 0 | Status: DISCONTINUED | OUTPATIENT
Start: 2019-11-03 | End: 2019-11-08

## 2019-11-03 RX ORDER — HYDRALAZINE HCL 50 MG
25 TABLET ORAL
Refills: 0 | Status: DISCONTINUED | OUTPATIENT
Start: 2019-11-03 | End: 2019-11-04

## 2019-11-03 RX ORDER — HEPARIN SODIUM 5000 [USP'U]/ML
5000 INJECTION INTRAVENOUS; SUBCUTANEOUS EVERY 8 HOURS
Refills: 0 | Status: DISCONTINUED | OUTPATIENT
Start: 2019-11-04 | End: 2019-11-05

## 2019-11-03 RX ORDER — FERROUS SULFATE 325(65) MG
325 TABLET ORAL
Refills: 0 | Status: DISCONTINUED | OUTPATIENT
Start: 2019-11-03 | End: 2019-11-08

## 2019-11-03 RX ORDER — ACETAMINOPHEN 500 MG
650 TABLET ORAL EVERY 6 HOURS
Refills: 0 | Status: DISCONTINUED | OUTPATIENT
Start: 2019-11-03 | End: 2019-11-03

## 2019-11-03 RX ORDER — LEVOTHYROXINE SODIUM 125 MCG
25 TABLET ORAL DAILY
Refills: 0 | Status: DISCONTINUED | OUTPATIENT
Start: 2019-11-03 | End: 2019-11-08

## 2019-11-03 RX ORDER — ACETAMINOPHEN 500 MG
650 TABLET ORAL EVERY 6 HOURS
Refills: 0 | Status: DISCONTINUED | OUTPATIENT
Start: 2019-11-03 | End: 2019-11-08

## 2019-11-03 RX ADMIN — BRIMONIDINE TARTRATE 1 DROP(S): 2 SOLUTION/ DROPS OPHTHALMIC at 05:09

## 2019-11-03 RX ADMIN — Medication 650 MILLIGRAM(S): at 10:00

## 2019-11-03 RX ADMIN — Medication 650 MILLIGRAM(S): at 17:16

## 2019-11-03 RX ADMIN — SODIUM CHLORIDE 35 MILLILITER(S): 9 INJECTION INTRAMUSCULAR; INTRAVENOUS; SUBCUTANEOUS at 05:00

## 2019-11-03 RX ADMIN — LATANOPROST 1 DROP(S): 0.05 SOLUTION/ DROPS OPHTHALMIC; TOPICAL at 00:35

## 2019-11-03 RX ADMIN — Medication 1 DROP(S): at 05:09

## 2019-11-03 RX ADMIN — LATANOPROST 1 DROP(S): 0.05 SOLUTION/ DROPS OPHTHALMIC; TOPICAL at 21:55

## 2019-11-03 RX ADMIN — Medication 25 MICROGRAM(S): at 05:11

## 2019-11-03 RX ADMIN — Medication 650 MILLIGRAM(S): at 18:16

## 2019-11-03 RX ADMIN — SODIUM CHLORIDE 35 MILLILITER(S): 9 INJECTION INTRAMUSCULAR; INTRAVENOUS; SUBCUTANEOUS at 21:59

## 2019-11-03 RX ADMIN — BRIMONIDINE TARTRATE 1 DROP(S): 2 SOLUTION/ DROPS OPHTHALMIC at 13:49

## 2019-11-03 RX ADMIN — Medication 325 MILLIGRAM(S): at 17:16

## 2019-11-03 RX ADMIN — Medication 1 APPLICATION(S): at 00:35

## 2019-11-03 RX ADMIN — SENNA PLUS 2 TABLET(S): 8.6 TABLET ORAL at 21:54

## 2019-11-03 RX ADMIN — BRIMONIDINE TARTRATE 1 DROP(S): 2 SOLUTION/ DROPS OPHTHALMIC at 21:54

## 2019-11-03 RX ADMIN — Medication 25 MILLIGRAM(S): at 17:15

## 2019-11-03 RX ADMIN — BRIMONIDINE TARTRATE 1 DROP(S): 2 SOLUTION/ DROPS OPHTHALMIC at 00:35

## 2019-11-03 RX ADMIN — Medication 25 MILLIGRAM(S): at 05:09

## 2019-11-03 RX ADMIN — Medication 100 MILLIGRAM(S): at 13:50

## 2019-11-03 RX ADMIN — Medication 325 MILLIGRAM(S): at 09:11

## 2019-11-03 RX ADMIN — Medication 100 MILLIGRAM(S): at 05:09

## 2019-11-03 RX ADMIN — Medication 1 DROP(S): at 17:16

## 2019-11-03 RX ADMIN — TETANUS TOXOID, REDUCED DIPHTHERIA TOXOID AND ACELLULAR PERTUSSIS VACCINE, ADSORBED 0.5 MILLILITER(S): 5; 2.5; 8; 8; 2.5 SUSPENSION INTRAMUSCULAR at 01:20

## 2019-11-03 RX ADMIN — Medication 650 MILLIGRAM(S): at 09:11

## 2019-11-03 RX ADMIN — Medication 100 MILLIGRAM(S): at 21:55

## 2019-11-03 NOTE — PROGRESS NOTE ADULT - SUBJECTIVE AND OBJECTIVE BOX
INTERVAL HPI/OVERNIGHT EVENTS:    No acute events overnight. Patient evaluated at bedside and found hemodynamically stable and in no acute distress. Patient with no major complaints at the moment. Denies, fevers, chils, nausea, vomiting or generalized malaise.     SUBJECTIVE:      MEDICATIONS  (STANDING):  brimonidine 0.2% Ophthalmic Solution 1 Drop(s) Right EYE every 8 hours  docusate sodium Oral Tab/Cap - Peds 100 milliGRAM(s) Oral three times a day  ferrous sulfate Oral Tab/Cap - Peds 325 milliGRAM(s) Oral two times a day with meals  hydrALAZINE 25 milliGRAM(s) Oral two times a day  influenza   Vaccine 0.5 milliLiter(s) IntraMuscular once  latanoprost 0.005% Ophthalmic Solution 1 Drop(s) Right EYE at bedtime  levothyroxine 25 MICROGram(s) Oral daily  senna 2 Tablet(s) Oral at bedtime  sodium chloride 0.9%. 1000 milliLiter(s) (35 mL/Hr) IV Continuous <Continuous>  timolol 0.5% Solution 1 Drop(s) Right EYE every 12 hours    MEDICATIONS  (PRN):  acetaminophen   Tablet .. 650 milliGRAM(s) Oral every 6 hours PRN Moderate Pain (4 - 6)      Vital Signs Last 24 Hrs  T(C): 36.3 (03 Nov 2019 06:06), Max: 36.7 (02 Nov 2019 15:34)  T(F): 97.4 (03 Nov 2019 06:06), Max: 98 (02 Nov 2019 15:34)  HR: 974 (03 Nov 2019 06:06) (78 - 974)  BP: 180/62 (03 Nov 2019 06:06) (135/63 - 193/74)  BP(mean): --  RR: 18 (03 Nov 2019 06:06) (16 - 18)  SpO2: 100% (03 Nov 2019 04:38) (91% - 100%)    PHYSICAL EXAM:      General: no acute distress   HEENT: normocephalic, maxillofacial structures stable, right lateral scleral hemorrhage, right-sided periorbital   ecchymosis and swelling. Left eye EOMI, right eye with difficult ocular eye movement due to swelling.  Chest: no labored breathing  Abdomen: soft and depressible, NT, ND        I&O's Detail      LABS:                        8.1    6.22  )-----------( 163      ( 02 Nov 2019 16:46 )             25.8     11-02    144  |  108<H>  |  66.0<H>  ----------------------------<  161<H>  5.3   |  22.0  |  2.55<H>    Ca    9.1      02 Nov 2019 16:46    TPro  7.4  /  Alb  4.2  /  TBili  0.2<L>  /  DBili  x   /  AST  40<H>  /  ALT  35<H>  /  AlkPhos  107  11-02    PT/INR - ( 02 Nov 2019 16:46 )   PT: 10.1 sec;   INR: 0.88 ratio         PTT - ( 02 Nov 2019 16:46 )  PTT:45.5 sec

## 2019-11-03 NOTE — INPATIENT CERTIFICATION FOR MEDICARE PATIENTS - RISKS OF ADVERSE EVENTS
Concern for worsening infectious process/Concern for renal deterioration/Concern for delay in diagnosis and treatment/Concern for cardiopulmonary deterioration/Concern for neurologic deterioration

## 2019-11-03 NOTE — PROGRESS NOTE ADULT - ASSESSMENT
89 year old female s/p a ground level fall earlier today sustaining maxillary fractures to right lateral orbital wall, increased IOP s/p right lateral canthotomy as well as retrobulbar hemorrhage of the right orbit.    -Continue Timolol, brimondine and latanoprost per Ophtho  -NPO, IVFs  -Pain control  -ice to right ice to help with reduction of swelling  -Monitor Cr for NAVIN  -SCD  -PT/OT

## 2019-11-03 NOTE — CONSULT NOTE ADULT - SUBJECTIVE AND OBJECTIVE BOX
[facility]                                   [address] [place] [address]                                                      PHONE: [phone]                                                         FAX: [phone]  [email or web address]  ---------------------------------------------------------------------------------------------------------------------------------    Reason for Consult: syncope    HPI:  LUIS MONET is an 89y Female with moderate AS (BC 1.1 cm2 April 2019), HTN, HLD presented late last night after fall and facial trauma. The patient denies LOC and says she remembers the fall. She suffered right lateral orbital and maxillary wall with increase IOP and now s/p lateral canthotomy. She denies any cardiovascular symptoms at this time.     PAST MEDICAL & SURGICAL HISTORY:  Chronic CHF  Glaucoma  HTN (hypertension)  Breast cancer: s/p RT lumpectomy and radiation  Hypothyroid  S/P cataract extraction: bilateral  S/P lumpectomy, right breast      codeine (Vomiting)  penicillin (Rash; Anaphylaxis)  Sulfacetamide Sodium (Rash)      MEDICATIONS  (STANDING):  acetaminophen   Tablet .. 650 milliGRAM(s) Oral every 6 hours  brimonidine 0.2% Ophthalmic Solution 1 Drop(s) Right EYE every 8 hours  docusate sodium Oral Tab/Cap - Peds 100 milliGRAM(s) Oral three times a day  ferrous sulfate Oral Tab/Cap - Peds 325 milliGRAM(s) Oral two times a day with meals  hydrALAZINE 25 milliGRAM(s) Oral two times a day  influenza   Vaccine 0.5 milliLiter(s) IntraMuscular once  latanoprost 0.005% Ophthalmic Solution 1 Drop(s) Right EYE at bedtime  levothyroxine 25 MICROGram(s) Oral daily  senna 2 Tablet(s) Oral at bedtime  sodium chloride 0.9%. 1000 milliLiter(s) (35 mL/Hr) IV Continuous <Continuous>  timolol 0.5% Solution 1 Drop(s) Right EYE every 12 hours    MEDICATIONS  (PRN):      Social History:  Cigarettes:                    Alchohol:                 Illicit Drug Abuse:      ROS: Negative other than as mentioned in HPI.    Vital Signs Last 24 Hrs  T(C): 34.2 (03 Nov 2019 13:34), Max: 36.7 (02 Nov 2019 15:34)  T(F): 93.6 (03 Nov 2019 13:34), Max: 98 (02 Nov 2019 15:34)  HR: 73 (03 Nov 2019 13:34) (73 - 974)  BP: 118/50 (03 Nov 2019 13:34) (118/50 - 193/74)  BP(mean): --  RR: 18 (03 Nov 2019 13:34) (16 - 18)  SpO2: 100% (03 Nov 2019 04:38) (91% - 100%)  ICU Vital Signs Last 24 Hrs  LUIS MONET  I&O's Detail    I&O's Summary    Drug Dosing Weight  LIUS GARCIATRICK      PHYSICAL EXAM:  General: Elderly frail female  HEENT: Head; normocephalic, atraumatic.  Eyes: Pupils reactive, cornea wnl.  Neck: Supple, no nodes adenopathy, no NVD or carotid bruit or thyromegaly.  CARDIOVASCULAR: Normal S1 and S2, systolic ejection murmur present  LUNGS: No rales, rhonchi or wheeze. Normal breath sounds bilaterally.  ABDOMEN: Soft, nontender without mass or organomegaly. bowel sounds normoactive.  EXTREMITIES: No clubbing, cyanosis or edema. Distal pulses wnl.   SKIN: warm and dry with normal turgor.  NEURO: Alert/oriented x 3/normal motor exam. No pathologic reflexes.    PSYCH: normal affect.        LABS:                        6.7    4.62  )-----------( 116      ( 03 Nov 2019 08:39 )             20.7     11-03    147<H>  |  114<H>  |  68.0<H>  ----------------------------<  73  5.5<H>   |  22.0  |  2.17<H>    Ca    8.7      03 Nov 2019 08:39  Phos  4.9     11-03  Mg     2.7     11-03    TPro  7.4  /  Alb  4.2  /  TBili  0.2<L>  /  DBili  x   /  AST  40<H>  /  ALT  35<H>  /  AlkPhos  107  11-02    LUIS MONET  CARDIAC MARKERS ( 02 Nov 2019 16:46 )  x     / 0.01 ng/mL / x     / x     / x          PT/INR - ( 02 Nov 2019 16:46 )   PT: 10.1 sec;   INR: 0.88 ratio         PTT - ( 02 Nov 2019 16:46 )  PTT:45.5 sec      RADIOLOGY & ADDITIONAL STUDIES:    INTERPRETATION OF TELEMETRY (personally reviewed): sinus rhythm with Wenkebach    ECG: sinus rhythm 83 bpm, 1st degree AV delay, normal axis, no ischemic ST abnormality    ECHO April 2019: EF > 75%. moderate aortic stenosis BC 1.1 cm2    Assessment and Plan:  In summary, LUIS MONET is an 89y Female with past medical history significant for moderate AS (BC 1.1 cm2 April 2019), HTN, HLD presented late last night after fall and facial trauma. The patient denies LOC and says she remembers the fall. She suffered right lateral orbital and maxillary wall with increase IOP and now s/p lateral canthotomy. She denies any cardiovascular symptoms at this time.     Fall v Syncope, Aortic Stenosis, Acute Anemia  - I had an extensive discussion with the family regarding her history of aortic stenosis and the possibility that it has gotten worse and may have caused her fall/syncope. They have expressed their wishes that they and the patient would not want any invasive or aggressive intervention such as TAVR in the event her aortic stenosis is now severe. In light of this, I would not repeat echocardiogram at this time.  - agree with PRBCs as needed  - supportive care per trauma service    Will follow with you.

## 2019-11-04 ENCOUNTER — TRANSCRIPTION ENCOUNTER (OUTPATIENT)
Age: 84
End: 2019-11-04

## 2019-11-04 LAB
ANION GAP SERPL CALC-SCNC: 13 MMOL/L — SIGNIFICANT CHANGE UP (ref 5–17)
BUN SERPL-MCNC: 67 MG/DL — HIGH (ref 8–20)
CALCIUM SERPL-MCNC: 8.9 MG/DL — SIGNIFICANT CHANGE UP (ref 8.6–10.2)
CHLORIDE SERPL-SCNC: 112 MMOL/L — HIGH (ref 98–107)
CO2 SERPL-SCNC: 20 MMOL/L — LOW (ref 22–29)
CREAT SERPL-MCNC: 2.53 MG/DL — HIGH (ref 0.5–1.3)
GLUCOSE SERPL-MCNC: 70 MG/DL — SIGNIFICANT CHANGE UP (ref 70–115)
HCT VFR BLD CALC: 33.8 % — LOW (ref 34.5–45)
HGB BLD-MCNC: 11.1 G/DL — LOW (ref 11.5–15.5)
MAGNESIUM SERPL-MCNC: 2.7 MG/DL — HIGH (ref 1.6–2.6)
MAGNESIUM SERPL-MCNC: 2.8 MG/DL — HIGH (ref 1.6–2.6)
MCHC RBC-ENTMCNC: 31.4 PG — SIGNIFICANT CHANGE UP (ref 27–34)
MCHC RBC-ENTMCNC: 32.8 GM/DL — SIGNIFICANT CHANGE UP (ref 32–36)
MCV RBC AUTO: 95.8 FL — SIGNIFICANT CHANGE UP (ref 80–100)
PHOSPHATE SERPL-MCNC: 4.6 MG/DL — SIGNIFICANT CHANGE UP (ref 2.4–4.7)
PHOSPHATE SERPL-MCNC: 5 MG/DL — HIGH (ref 2.4–4.7)
PLATELET # BLD AUTO: 117 K/UL — LOW (ref 150–400)
POTASSIUM SERPL-MCNC: 5.8 MMOL/L — HIGH (ref 3.5–5.3)
POTASSIUM SERPL-SCNC: 5.8 MMOL/L — HIGH (ref 3.5–5.3)
RBC # BLD: 3.53 M/UL — LOW (ref 3.8–5.2)
RBC # FLD: 18.3 % — HIGH (ref 10.3–14.5)
SODIUM SERPL-SCNC: 145 MMOL/L — SIGNIFICANT CHANGE UP (ref 135–145)
WBC # BLD: 5.7 K/UL — SIGNIFICANT CHANGE UP (ref 3.8–10.5)
WBC # FLD AUTO: 5.7 K/UL — SIGNIFICANT CHANGE UP (ref 3.8–10.5)

## 2019-11-04 PROCEDURE — 93010 ELECTROCARDIOGRAM REPORT: CPT

## 2019-11-04 PROCEDURE — 99223 1ST HOSP IP/OBS HIGH 75: CPT

## 2019-11-04 PROCEDURE — 99232 SBSQ HOSP IP/OBS MODERATE 35: CPT

## 2019-11-04 RX ORDER — CIPROFLOXACIN LACTATE 400MG/40ML
1 VIAL (ML) INTRAVENOUS
Qty: 0 | Refills: 0 | DISCHARGE

## 2019-11-04 RX ORDER — SODIUM CHLORIDE 9 MG/ML
500 INJECTION INTRAMUSCULAR; INTRAVENOUS; SUBCUTANEOUS ONCE
Refills: 0 | Status: COMPLETED | OUTPATIENT
Start: 2019-11-04 | End: 2019-11-04

## 2019-11-04 RX ORDER — ALBUTEROL 90 UG/1
2.5 AEROSOL, METERED ORAL THREE TIMES A DAY
Refills: 0 | Status: DISCONTINUED | OUTPATIENT
Start: 2019-11-04 | End: 2019-11-04

## 2019-11-04 RX ORDER — METRONIDAZOLE 500 MG
1 TABLET ORAL
Qty: 0 | Refills: 0 | DISCHARGE

## 2019-11-04 RX ORDER — LIDOCAINE 4 G/100G
1 CREAM TOPICAL DAILY
Refills: 0 | Status: DISCONTINUED | OUTPATIENT
Start: 2019-11-04 | End: 2019-11-08

## 2019-11-04 RX ORDER — ALBUTEROL 90 UG/1
2.5 AEROSOL, METERED ORAL ONCE
Refills: 0 | Status: COMPLETED | OUTPATIENT
Start: 2019-11-04 | End: 2019-11-04

## 2019-11-04 RX ADMIN — Medication 100 MILLIGRAM(S): at 06:09

## 2019-11-04 RX ADMIN — Medication 25 MILLIGRAM(S): at 06:09

## 2019-11-04 RX ADMIN — BRIMONIDINE TARTRATE 1 DROP(S): 2 SOLUTION/ DROPS OPHTHALMIC at 14:32

## 2019-11-04 RX ADMIN — Medication 4 MILLILITER(S): at 14:59

## 2019-11-04 RX ADMIN — Medication 650 MILLIGRAM(S): at 18:10

## 2019-11-04 RX ADMIN — Medication 650 MILLIGRAM(S): at 15:30

## 2019-11-04 RX ADMIN — HEPARIN SODIUM 5000 UNIT(S): 5000 INJECTION INTRAVENOUS; SUBCUTANEOUS at 21:09

## 2019-11-04 RX ADMIN — Medication 325 MILLIGRAM(S): at 18:10

## 2019-11-04 RX ADMIN — HEPARIN SODIUM 5000 UNIT(S): 5000 INJECTION INTRAVENOUS; SUBCUTANEOUS at 06:09

## 2019-11-04 RX ADMIN — Medication 650 MILLIGRAM(S): at 18:39

## 2019-11-04 RX ADMIN — SODIUM CHLORIDE 500 MILLILITER(S): 9 INJECTION INTRAMUSCULAR; INTRAVENOUS; SUBCUTANEOUS at 16:15

## 2019-11-04 RX ADMIN — Medication 1 DROP(S): at 14:05

## 2019-11-04 RX ADMIN — ALBUTEROL 2.5 MILLIGRAM(S): 90 AEROSOL, METERED ORAL at 14:59

## 2019-11-04 RX ADMIN — Medication 100 MILLIGRAM(S): at 21:09

## 2019-11-04 RX ADMIN — Medication 100 MILLIGRAM(S): at 14:06

## 2019-11-04 RX ADMIN — Medication 1 DROP(S): at 18:40

## 2019-11-04 RX ADMIN — Medication 650 MILLIGRAM(S): at 14:32

## 2019-11-04 RX ADMIN — Medication 325 MILLIGRAM(S): at 09:24

## 2019-11-04 RX ADMIN — Medication 25 MICROGRAM(S): at 06:08

## 2019-11-04 RX ADMIN — HEPARIN SODIUM 5000 UNIT(S): 5000 INJECTION INTRAVENOUS; SUBCUTANEOUS at 14:32

## 2019-11-04 RX ADMIN — BRIMONIDINE TARTRATE 1 DROP(S): 2 SOLUTION/ DROPS OPHTHALMIC at 21:09

## 2019-11-04 RX ADMIN — SENNA PLUS 2 TABLET(S): 8.6 TABLET ORAL at 21:09

## 2019-11-04 RX ADMIN — BRIMONIDINE TARTRATE 1 DROP(S): 2 SOLUTION/ DROPS OPHTHALMIC at 06:10

## 2019-11-04 RX ADMIN — LATANOPROST 1 DROP(S): 0.05 SOLUTION/ DROPS OPHTHALMIC; TOPICAL at 21:09

## 2019-11-04 NOTE — PROGRESS NOTE ADULT - SUBJECTIVE AND OBJECTIVE BOX
Acute Care Surgery/Trauma Surgery Progress Note:  No acute overnight events. Pt w/ symptomatic anemia to hgb 6.7, transfused 2U w/ adequate response. Continues to get light hydration for NAVIN. Patient afebrile, VSS. Pain control improved. Extensive conversation had w/ patient's family who reiterated pt is DNR/DNI and that they would refuse any invasive intervention given her overall frailty.     Diet, Soft:   Supplement Feeding Modality:  OralEnsure Enlive Cans or Servings Per Day:  3       Frequency:  Three Times a day (11-03-19 @ 09:01)        Objective:   T(F): 97.7 (22:00), Max: 98.5 (15:43)  HR: 64 (01:14) (64 - 974)  BP: 153/56 (01:14) (84/42 - 180/62)  BP(mean): --  ABP: --  ABP(mean): --  RR: 16 (01:14) (16 - 18)  SpO2: 95% (22:00) (95% - 97%)    Physical Exam:   GEN: elderly female, NAD  RESP: respirations are unlabored, no accessory muscle use  CVS: Paced rhythm  GI: Abdomen soft, non-tender, non-distended, no rebound tenderness / guarding  Skin: Right periorbital ecchymosis and swelling improved. Canthotomy site c/d/i    I&O's        LABS:                        10.4   5.19  )-----------( 106      ( 03 Nov 2019 23:43 )             32.4     11-03    147<H>  |  114<H>  |  68.0<H>  ----------------------------<  73  5.5<H>   |  22.0  |  2.17<H>    Ca    8.7      03 Nov 2019 08:39  Phos  5.0     11-03  Mg     2.7     11-03    TPro  7.4  /  Alb  4.2  /  TBili  0.2<L>  /  DBili  x   /  AST  40<H>  /  ALT  35<H>  /  AlkPhos  107  11-02    PT/INR - ( 02 Nov 2019 16:46 )   PT: 10.1 sec;   INR: 0.88 ratio         PTT - ( 02 Nov 2019 16:46 )  PTT:45.5 sec

## 2019-11-04 NOTE — OCCUPATIONAL THERAPY INITIAL EVALUATION ADULT - PERTINENT HX OF CURRENT PROBLEM, REHAB EVAL
pt presents to ED s/p fall from standing height. CT of head shows no evidence of acute intracranial abnormality or hemorrhage; there is hemorrhage within the right maxillary sinus and fractures of the right lateral maxillary wall; large right periorbital hematoma.

## 2019-11-04 NOTE — DISCHARGE NOTE PROVIDER - CARE PROVIDER_API CALL
Jazmine Rothman)  Plastic Surgery  [address] [place] [address]  Phone: [phone]  Fax: [phone]  Follow Up Time: Jazmine Rothman)  Plastic Surgery  [address] [place] [address]  Phone: [phone]  Fax: [phone]  Follow Up Time:     Shravan Boothe)  Infectious Disease; Internal Medicine  [address] [place] [address]  Phone: [phone]  Fax: [phone]  Follow Up Time:

## 2019-11-04 NOTE — OCCUPATIONAL THERAPY INITIAL EVALUATION ADULT - ADDITIONAL COMMENTS
Pt lives in a house with no SUGAR and no stairs inside. Bathroom has a tub with curtains. Pt owns RW, cane, and shower chair. Pt is right handed. Pt does not drive. Pt reports daughter works during the day but assists her as needed.

## 2019-11-04 NOTE — OCCUPATIONAL THERAPY INITIAL EVALUATION ADULT - MANUAL MUSCLE TESTING RESULTS, REHAB EVAL
bilateral shoulders grossly assessed with AROM against gravity 3/5; bilateral elbows grossly assessed with AROM against gravity 3/5; bilateral gross grasp 3/5

## 2019-11-04 NOTE — OCCUPATIONAL THERAPY INITIAL EVALUATION ADULT - SENSORY TESTS
Pt denies changes in sensation; pt with +bilateral pedal pulses, +bilateral radial pulses, +capillary refill in bilateral fingers and bilateral toes

## 2019-11-04 NOTE — CONSULT NOTE ADULT - ATTENDING COMMENTS
CT scan reviewed. Agree with above plan. Will follow up as outpatient. Family does not want surgical intervention

## 2019-11-04 NOTE — CONSULT NOTE ADULT - ASSESSMENT
89 y/o female with HTN, renal insuff, Hypothyroidism, Glaucoma, Aortic stenosis, facial bones fracture, hyperkalemia    ·	no further work up for facial fracture or Aortic stenosis  ·	increase po free water. cut down Ensure. morning labs  ·	continue with Hydralazine and eye dops

## 2019-11-04 NOTE — DISCHARGE NOTE PROVIDER - NSDCMRMEDTOKEN_GEN_ALL_CORE_FT
acetaminophen 325 mg oral tablet: 1 tab(s) orally every 4 hours, As needed, Mild Pain (1 - 3), Moderate Pain (4 - 6)  aspirin: 81 milligram(s) orally once a day  Cipro 250 mg oral tablet: 1 tab(s) orally every 12 hours x 4 days   DME: Diego walker: Use as needed when ambulating  docusate sodium 100 mg oral capsule: 1 cap(s) orally 3 times a day  ferrous sulfate 325 mg (65 mg elemental iron) oral tablet: 1 tab(s) orally 2 times a day  Flagyl 500 mg oral tablet: 1 tab(s) orally 3 times a day x 4 days   furosemide 20 mg oral tablet: 1 tab(s) orally once a day  hydrALAZINE 25 mg oral tablet: 1 tab(s) orally 2 times a day  Multiple Vitamins oral tablet: 1 tab(s) orally once a day  polyethylene glycol 3350 oral powder for reconstitution: 17 gram(s) orally once a day  Pravachol 20 mg oral tablet: 1 tab(s) orally once a day  senna oral tablet: 2 tab(s) orally once a day (at bedtime)  Synthroid 25 mcg (0.025 mg) oral tablet: 1 tab(s) orally once a day Mon-Fri  2 tabs orally once a day Sat-Sun acetaminophen 325 mg oral tablet: 1 tab(s) orally every 4 hours, As needed, Mild Pain (1 - 3), Moderate Pain (4 - 6)  aspirin: 81 milligram(s) orally once a day  DME: Diego walker: Use as needed when ambulating  docusate sodium 100 mg oral capsule: 1 cap(s) orally 3 times a day  ferrous sulfate 325 mg (65 mg elemental iron) oral tablet: 1 tab(s) orally 2 times a day  furosemide 20 mg oral tablet: 1 tab(s) orally once a day  hydrALAZINE 25 mg oral tablet: 1 tab(s) orally 2 times a day  Multiple Vitamins oral tablet: 1 tab(s) orally once a day  polyethylene glycol 3350 oral powder for reconstitution: 17 gram(s) orally once a day  Pravachol 20 mg oral tablet: 1 tab(s) orally once a day  senna oral tablet: 2 tab(s) orally once a day (at bedtime)  Synthroid 25 mcg (0.025 mg) oral tablet: 1 tab(s) orally once a day Mon-Fri  2 tabs orally once a day Sat-Sun acetaminophen 325 mg oral tablet: 1 tab(s) orally every 4 hours, As needed, Mild Pain (1 - 3), Moderate Pain (4 - 6)  aspirin: 81 milligram(s) orally once a day  brimonidine 0.2% ophthalmic solution: 1 drop(s) to each affected eye every 8 hours  cefpodoxime 200 mg oral tablet: 1 tab(s) orally every 12 hours until 11/17/19  DME: Diego walker: Use as needed when ambulating  docusate sodium 100 mg oral capsule: 1 cap(s) orally 3 times a day  ferrous sulfate 325 mg (65 mg elemental iron) oral tablet: 1 tab(s) orally 2 times a day  furosemide 20 mg oral tablet: 1 tab(s) orally once a day  hydrALAZINE 25 mg oral tablet: 1 tab(s) orally 2 times a day  latanoprost 0.005% ophthalmic solution: 1 drop(s) to each affected eye once a day (at bedtime)  Multiple Vitamins oral tablet: 1 tab(s) orally once a day  polyethylene glycol 3350 oral powder for reconstitution: 17 gram(s) orally once a day  Pravachol 20 mg oral tablet: 1 tab(s) orally once a day  senna oral tablet: 2 tab(s) orally once a day (at bedtime)  Synthroid 25 mcg (0.025 mg) oral tablet: 1 tab(s) orally once a day Mon-Fri  2 tabs orally once a day Sat-Sun  timolol maleate 0.5% ophthalmic solution: 1 drop(s) to each affected eye every 12 hours acetaminophen 325 mg oral tablet: 1 tab(s) orally every 4 hours, As needed, Mild Pain (1 - 3), Moderate Pain (4 - 6)  aspirin: 81 milligram(s) orally once a day  brimonidine 0.2% ophthalmic solution: 1 drop(s) to each affected eye every 8 hours  cefpodoxime 200 mg oral tablet: 1 tab(s) orally once a day until 11/14/19  DME: Diego walker: Use as needed when ambulating  docusate sodium 100 mg oral capsule: 1 cap(s) orally 3 times a day  ferrous sulfate 325 mg (65 mg elemental iron) oral tablet: 1 tab(s) orally 2 times a day  furosemide 20 mg oral tablet: 1 tab(s) orally once a day  hydrALAZINE 25 mg oral tablet: 1 tab(s) orally 2 times a day  latanoprost 0.005% ophthalmic solution: 1 drop(s) to each affected eye once a day (at bedtime)  Multiple Vitamins oral tablet: 1 tab(s) orally once a day  polyethylene glycol 3350 oral powder for reconstitution: 17 gram(s) orally once a day  Pravachol 20 mg oral tablet: 1 tab(s) orally once a day  senna oral tablet: 2 tab(s) orally once a day (at bedtime)  Synthroid 25 mcg (0.025 mg) oral tablet: 1 tab(s) orally once a day Mon-Fri  2 tabs orally once a day Sat-Sun  timolol maleate 0.5% ophthalmic solution: 1 drop(s) to each affected eye every 12 hours

## 2019-11-04 NOTE — CONSULT NOTE ADULT - SUBJECTIVE AND OBJECTIVE BOX
th h/o HTN, Hypothyroidism, Glaucoma was admitted to trauma surgery after a fall/syncope from standing resulting in comminuted  fracture of right facial bones with right periorbital hematoma.  Plastic surgery was consulted. no surgical treatment is planned. patient was seen by cardio (Agapito), no further evaluation for Aortic valve stenosis and patient and family defer any possible surgical treatment. patient was seen and examined. no complaint but willinh to go home. S. potassium: 5.8 possibly because patient received 2 units pRBC transfusion and decreased po free water and receiving 3 cans of Ensure daily.    HPI:  89 year old female with PMH significant for chronic CHF, Glaucoma, HTN and hypothyroidism presented [facility] ED after sustaining a ground level fall earlier today. Patient states that she was getting up from her bed when she felt weak, loss her balance and fell. Patient could not provide further details regarding the incident. Patient taking ASA-81mg daily. Upon further workup, CT head and maxillofacial revealed comminuted fracture of right lateral orbital wall, hemorrhage within retrobulbar lateral extraconal space of right orbit as well as w nondisplaced fracture of right lateral maxillary wall.     While in the ED it was noted that the patient had increased IOP, Ophthalmology recommended lateral canthotomy and several eye drops to aid in reducing IOP. Patient with no major complaints other than right periorbital pain, mild headache but denies nausea nor emesis. (02 Nov 2019 22:31)        PAST MEDICAL & SURGICAL HISTORY:  Chronic CHF  Glaucoma  HTN (hypertension)  Breast cancer: s/p RT lumpectomy and radiation  Hypothyroid  S/P cataract extraction: bilateral  S/P lumpectomy, right breast        MEDICATIONS  (STANDING):  acetaminophen   Tablet .. 650 milliGRAM(s) Oral every 6 hours  acetylcysteine 20%  Inhalation 4 milliLiter(s) Inhalation three times a day  brimonidine 0.2% Ophthalmic Solution 1 Drop(s) Right EYE every 8 hours  docusate sodium Oral Tab/Cap - Peds 100 milliGRAM(s) Oral three times a day  ferrous sulfate Oral Tab/Cap - Peds 325 milliGRAM(s) Oral two times a day with meals  heparin  Injectable 5000 Unit(s) SubCutaneous every 8 hours  hydrALAZINE 25 milliGRAM(s) Oral two times a day  influenza   Vaccine 0.5 milliLiter(s) IntraMuscular once  latanoprost 0.005% Ophthalmic Solution 1 Drop(s) Right EYE at bedtime  levothyroxine 25 MICROGram(s) Oral daily  lidocaine   Patch 1 Patch Transdermal daily  senna 2 Tablet(s) Oral at bedtime  timolol 0.5% Solution 1 Drop(s) Right EYE every 12 hours    MEDICATIONS  (PRN):        Allergies    codeine (Vomiting)  penicillin (Rash; Anaphylaxis)  Sulfacetamide Sodium (Rash)    Intolerances        SOCIAL HISTORY:    FAMILY HISTORY:  Family history of brain cancer: Brother  Family history of uterine cancer: Sister - cervical/uterine  Family hx of lung cancer: 2 Brothers      Vital Signs Last 24 Hrs  T(C): 35.6 (04 Nov 2019 13:15), Max: 36.9 (03 Nov 2019 15:43)  T(F): 96.1 (04 Nov 2019 13:15), Max: 98.5 (03 Nov 2019 15:43)  HR: 72 (04 Nov 2019 11:31) (58 - 74)  BP: 142/58 (04 Nov 2019 11:31) (84/42 - 167/66)  BP(mean): --  RR: 16 (04 Nov 2019 11:31) (16 - 18)  SpO2: 95% (04 Nov 2019 11:31) (95% - 97%)    Physical Exam:   GEN: NAD, dry mouth  HEENT: EOMI, PERRL  Neck: supple. no palpable lymph nodes  CV: S1 S2, RRR. systolic murmur  Lung: CTA bilaterally  Abd: soft NT ND +BS  Ext: no c/c/e. no calf tenderness  Neuro: no focal neuro deficit  Skin: no rash      LABS:                          11.1   5.70  )-----------( 117      ( 04 Nov 2019 06:28 )             33.8     11-04    145  |  112<H>  |  67.0<H>  ----------------------------<  70  5.8<H>   |  20.0<L>  |  2.53<H>    Ca    8.9      04 Nov 2019 06:28  Phos  4.6     11-04  Mg     2.8     11-04    TPro  7.4  /  Alb  4.2  /  TBili  0.2<L>  /  DBili  x   /  AST  40<H>  /  ALT  35<H>  /  AlkPhos  107  11-02    PT/INR - ( 02 Nov 2019 16:46 )   PT: 10.1 sec;   INR: 0.88 ratio         PTT - ( 02 Nov 2019 16:46 )  PTT:45.5 sec      Assessment and Plan:

## 2019-11-04 NOTE — CONSULT NOTE ADULT - ASSESSMENT
A/P   89 year old female DNR/DNI with displaced comminuted fracture of the right lateral orbital wall. Pt s/p canthotomy by optho on eye drops. Pt family wishes are to avoid any invasive intervention.    A/P A/P   89 year old female DNR/DNI with displaced comminuted fracture of the right lateral orbital wall. Pt s/p canthotomy by optho on eye drops. Pt family wishes are to avoid any invasive intervention.    Rec:  No surgical intervention  Pt to follow up as OP upon discharge.  Pain management and medical management per trauma

## 2019-11-04 NOTE — CONSULT NOTE ADULT - SUBJECTIVE AND OBJECTIVE BOX
89 year old female DNR/DNI with PMH significant for chronic CHF, Glaucoma, HTN and hypothyroidism presented [facility] ED after sustaining a ground level fall earlier today. Patient states that she was getting up from her bed when she felt weak, loss her balance and fell. Patient could not provide further details regarding the incident. Patient taking ASA-81mg daily. Upon further workup, CT head and maxillofacial revealed comminuted fracture of right lateral orbital wall, hemorrhage within retrobulbar lateral extraconal space of right orbit as well as w nondisplaced fracture of right lateral maxillary wall.     While in the ED it was noted that the patient had increased IOP, Ophthalmology recommended lateral canthotomy and use of Timolol, brimondine and latanoprost. Pt anemic with Hgb 6.7-->Transfused 2UPRBC. Extensive conversation had w/ patient's family who reiterated pt is DNR/DNI and that they would refuse any invasive intervention given her overall frailty.     CT Maxillofacial: 11/2/2019  IMPRESSION:      Displaced comminuted fracture of the right lateral orbital wall. This   fracture may extend into the lateral portion of the right inferior   orbital wall. There is hemorrhage within the retrobulbar lateral   extraconal space of the right orbit (lateral to the lateral rectus   muscle). There is mild proptosis of the right globe. The right optic   nerve and extraocular muscles are intact.    There are nondisplaced fractures of the right lateral maxillary wall.   There is hemorrhage filling the right maxillary sinus.    Large hematoma in the right periorbital soft tissues. 89 year old female DNR/DNI with PMH significant for chronic CHF, Glaucoma, HTN and hypothyroidism presented [facility] ED after sustaining a ground level fall earlier today. Patient states that she was getting up from her bed when she felt weak, loss her balance and fell. Patient could not provide further details regarding the incident. Patient taking ASA-81mg daily. Upon further workup, CT head and maxillofacial revealed comminuted fracture of right lateral orbital wall, hemorrhage within retrobulbar lateral extraconal space of right orbit as well as w nondisplaced fracture of right lateral maxillary wall.     While in the ED it was noted that the patient had increased IOP, Ophthalmology recommended lateral canthotomy and use of Timolol, brimondine and latanoprost. Pt anemic with Hgb 6.7-->Transfused 2UPRBC. Extensive conversation had w/ patient's family who reiterated pt is DNR/DNI and that they would refuse any invasive intervention given her overall frailty. Pt states she would not like surgical intervention.     CT Maxillofacial: 11/2/2019  IMPRESSION:      Displaced comminuted fracture of the right lateral orbital wall. This   fracture may extend into the lateral portion of the right inferior   orbital wall. There is hemorrhage within the retrobulbar lateral   extraconal space of the right orbit (lateral to the lateral rectus   muscle). There is mild proptosis of the right globe. The right optic   nerve and extraocular muscles are intact.    There are nondisplaced fractures of the right lateral maxillary wall.   There is hemorrhage filling the right maxillary sinus.    Large hematoma in the right periorbital soft tissues.    Exam: Pt awake and alert. Conversive and answering questions appropriately. Diffuse ecchymosis on the R lateral face down to the neck. EOMI. Subconjunctival hemorrhage.  No palpable dali step off. Suspected palpable hematoma on the lateral aspect of the R eye. Eye opens and closes without difficulty. No drainage.

## 2019-11-04 NOTE — PROGRESS NOTE ADULT - ASSESSMENT
89 year old female s/p a ground level fall earlier today sustaining maxillary fractures to right lateral orbital wall, increased IOP s/p right lateral canthotomy as well as retrobulbar hemorrhage of the right orbit. Responded appropriately to transfusion of 2U prbc.     - Continue Timolol, brimondine and latanoprost per Ophtho  - Pain control, avoid narcotics as pt high risk for delirium  - ice to right ice to help with reduction of swelling  - Cont trend Cr for NAVIN  - DVT Ppx: SCD + HSQ  - PT/OT  - palliative  - Given pt DNR/DNI and would likely refuse any intervention, discuss removing monitoring and accelerating pt discharge home or to appropriate facility.

## 2019-11-04 NOTE — OCCUPATIONAL THERAPY INITIAL EVALUATION ADULT - LEVEL OF INDEPENDENCE: BED TO CHAIR, REHAB EVAL
not assessed secondary to pt with bowel and urinary incontinence; pt returned to bed, CNA aware and in to clean pt

## 2019-11-04 NOTE — DISCHARGE NOTE PROVIDER - PROVIDER TOKENS
PROVIDER:[TOKEN:[[id number]]] PROVIDER:[TOKEN:[[id number]]],PROVIDER:[TOKEN:[[id number]]]

## 2019-11-04 NOTE — PROGRESS NOTE ADULT - ATTENDING COMMENTS
hyperkalemic this morning  plan for EKG and repeat labs  plan to transfer to medicine as facial fractures are nonoperative.

## 2019-11-04 NOTE — DISCHARGE NOTE PROVIDER - HOSPITAL COURSE
HPI: 89 year old female with PMH significant for chronic CHF, Glaucoma, HTN and hypothyroidism presented [facility] ED after sustaining a ground level fall earlier today. Patient stated that she was getting up from her bed when she felt weak, loss her balance and fell. Patient could not provide further details regarding the incident. Patient taking ASA-81mg daily.         Hospital Course: Upon imaging in ED, CT head and maxillofacial revealed comminuted fracture of right lateral orbital wall, hemorrhage within retrobulbar lateral extraconal space of right orbit as well as a nondisplaced fracture of right lateral maxillary wall. While in the ED it was noted that the patient had increased IOP. Ophthalmology recommended lateral canthotomy and several eye drops to aid in reducing IOP. Patient was admitted to Trauma service for continued monitoring. Patient had NAVIN upon admission for which she received IV hydration. BUN/Cr continued to trend down throughout stay. Patient had episode of symptomatic anemia with HGB 6.7. She was transfused with 2 units PRBC and responded appropriately up to HGB of 10.4. Cardiology was consulted for aortic stenosis being cause of possible syncope. They had long conversation with family and family expressed they do not wish to have any invasive or aggressive interventions such as TAVR in the event her aortic stenosis is now severe. No further workup to assess aortic stenosis was recommended. Dr. Rothman was consulted and recommended ________________________________________________. PT/OT was consulted to evaluate the patient and recommended ______________. Patient was tolerating soft PO diet well. Pain was well controlled at the time of discharge. Voiding spontaneously. Patient was stable for discharge to ___________.                Length of time preparing discharge > 30 minutes HPI: 89 year old female with PMH significant for chronic CHF, Glaucoma, HTN and hypothyroidism presented [facility] ED after sustaining a ground level fall earlier today. Patient stated that she was getting up from her bed when she felt weak, loss her balance and fell. Patient could not provide further details regarding the incident. Patient taking ASA-81mg daily.         Hospital Course: Upon imaging in ED, CT head and maxillofacial revealed comminuted fracture of right lateral orbital wall, hemorrhage within retrobulbar lateral extraconal space of right orbit as well as a nondisplaced fracture of right lateral maxillary wall. While in the ED it was noted that the patient had increased IOP. Ophthalmology recommended lateral canthotomy and several eye drops to aid in reducing IOP. Patient was admitted to Trauma service for continued monitoring. Patient had NAVIN upon admission for which she received IV hydration. BUN/Cr continued to trend down throughout stay. Patient had episode of symptomatic anemia with HGB 6.7. She was transfused with 2 units PRBC and responded appropriately up to HGB of 10.4. Cardiology was consulted for aortic stenosis being cause of possible syncope. They had long conversation with family and family expressed they do not wish to have any invasive or aggressive interventions such as TAVR in the event her aortic stenosis is now severe. No further workup to assess aortic stenosis was recommended. Dr. Rothman was consulted and recommended conservative therapy, outpatient follow up.  PT/OT was consulted to evaluate the patient and recommended ______________. Patient was tolerating soft PO diet well. Pain was well controlled at the time of discharge. Voiding spontaneously. Patient was stable for discharge to ___________.                Length of time preparing discharge > 30 minutes HPI: 89 year old female with PMH significant for chronic CHF, Glaucoma, HTN and hypothyroidism presented [facility] ED after sustaining a ground level fall earlier today. Patient stated that she was getting up from her bed when she felt weak, loss her balance and fell. Patient could not provide further details regarding the incident. Patient taking ASA-81mg daily.         Hospital Course: Upon imaging in ED, CT head and maxillofacial revealed comminuted fracture of right lateral orbital wall, hemorrhage within retrobulbar lateral extraconal space of right orbit as well as a nondisplaced fracture of right lateral maxillary wall. While in the ED it was noted that the patient had increased IOP. Ophthalmology recommended lateral canthotomy and several eye drops to aid in reducing IOP. Patient was admitted to Trauma service for continued monitoring. Patient had NAVIN upon admission for which she received IV hydration. BUN/Cr continued to trend down throughout stay. Patient had episode of symptomatic anemia with HGB 6.7. She was transfused with 2 units PRBC and responded appropriately up to HGB of 10.4. Cardiology was consulted for aortic stenosis being cause of possible syncope. They had long conversation with family and family expressed they do not wish to have any invasive or aggressive interventions such as TAVR in the event her aortic stenosis is now severe. No further workup to assess aortic stenosis was recommended. Dr. Rothman was consulted and recommended conservative therapy, outpatient follow up.  Pt was found to be hypothermic due to sepsis & UTI on IV abx started by ID & switched to PO. Blood cx was -ve so far. Cleared by ID Dr. ely for d/c. as per PT/OT was consulted to evaluate the patient and recommended ____SAR__________. Patient was tolerating soft PO diet well. Swallow eval to follow at [facility] for diet advancement. Pain was well controlled at the time of discharge. Pt was retaining urine, pedroza was placed. Seen by urology who suggested TOV at [facility] when more ambulatory. . Patient was stable for discharge to ___SAR________.            Hypothermia-resolved. Likely from hypoglycemia vs from infection. Swallow eval to follow in [facility](pt does not like swallow recommendations).      mildly elevated TSH, not likely the cause of hypothermia. f/u AM cortisol.        Sinus nicole- mostly when alseep, monitor on tele for now. Cardio signed off. Daughter not wanting aggressive measures         Ecoli UTI- Merrem swtiched to PO vantin. First dose given with no reaction.  Blood cx NGTD. Pedroza placed at [facility] for retention.  called        Urinary retention- Pedroza placed at [facility] for retention.  called. Maintain pedroza with TOV when more ambulatory. Last BM unknown by nursing. Continue bowel regimen        Hypoglycemia- s/p D5. Now improved. Encourage PO intake. f/u FS            Orbital fracture - No intervention    - Continue Tylenol        Fall, pause on monitoring    - Continue to monitor    - PT evaluation- [facility]    - Cardiology note appreciated. Pt & family wishes that they and the patient would not want any invasive or aggressive intervention such as TAVR in the event her aortic stenosis is now severe. In light of this, repeat echocardiogram not warranted at this time.            Hyperkalemia, likely secondary to hemolysis from fall    - Received Lokelma with resolution        CKD 4    - IVF    - Monitor SCr, K        HTN    - No medications        Hypothyroidism    - Continue Synthroid    Mildly elevated TSH, repeat as outpatient        Anemia     - Continue Iron supplement    - monitor Hgb        Glaucoma    - Continue eye drops        Prognosis - Guarded.        Prior hospitalist discussed with grand daughter - patient and daughter do not want aggressive measure. Pt is DNR/DNI    [facility] RN met with patient and patient's daughter at patient bedside. As per daughter Morena, plan is for patient to go to [facility].                    Length of time preparing discharge > 30 minutes         PHYSICAL EXAM:    GENERAL: NAD    HEAD:  Normocephalic, right orbit with ecchymosis    NECK: Supple, No JVD    NERVOUS SYSTEM:  Alert & Oriented X3, Good concentration; generalized weakness    CHEST/LUNG: Clear to auscultation bilaterally; No rales, rhonchi, wheezing, or rubs    HEART: Regular rate and rhythm; No murmurs, rubs, or gallops    ABDOMEN: Soft, Nontender, Nondistended; Bowel sounds present; (+) pedroza    EXTREMITIES:  2+ Peripheral Pulses, No clubbing, cyanosis, or edema        VSS. Medically stable for d/c to [facility]

## 2019-11-04 NOTE — CHART NOTE - NSCHARTNOTEFT_GEN_A_CORE
Transfer of Care    Patient is a 89 yo F s/p ground level fall with facial trauma with increased IOP requiring lateral canthotomy during 11/2 with comminuted fracture of right lateral orbital wall and non-displaced fracture of right lateral maxillary wall, consulted with Plastic Surgery who determine the fractures to be non-operative. No further surgical interventions required. Patient however with PMH of glaucoma, hypothyroidism, HLD, HTN, chronic CHF, currently with acute on chronic kidney failure, creatinine ranging from 2-3 and hyperkalemia. EKG performed with no findings compared to EKG done on admission. Transfer of care was requested, spoke with Dr. Valladares who accepted the patient to be under his care.

## 2019-11-04 NOTE — DISCHARGE NOTE PROVIDER - NSDCCPCAREPLAN_GEN_ALL_CORE_FT
PRINCIPAL DISCHARGE DIAGNOSIS  Diagnosis: Orbital fracture, open, initial encounter  Assessment and Plan of Treatment: Follow up with Dr. Rothman as outpatient. Call for appointment. Please follow up with Acute Care Surgery in clinic x 2 weeks. Please follow up with your primary care physician regarding your hospitalization. Return to ED if worsening pain, inability to perform usual activities of daily living, inability to tolerate diet.      SECONDARY DISCHARGE DIAGNOSES  Diagnosis: Shortness of breath at rest  Assessment and Plan of Treatment:     Diagnosis: Hypoxia  Assessment and Plan of Treatment:     Diagnosis: Retrobulbar hemorrhage  Assessment and Plan of Treatment:

## 2019-11-04 NOTE — DISCHARGE NOTE PROVIDER - NSFOLLOWUPCLINICS_GEN_ALL_ED_FT
[facility] Acute Care Surgery  Acute Care Surgery  [address] [place] [id number]  Phone: [phone]  Fax:   Follow Up Time:

## 2019-11-04 NOTE — OCCUPATIONAL THERAPY INITIAL EVALUATION ADULT - PRECAUTIONS/LIMITATIONS, REHAB EVAL
oxygen therapy device and L/min/fall precautions/+4LNCO2 fall precautions/oxygen therapy device and L/min/4LNCO2

## 2019-11-04 NOTE — OCCUPATIONAL THERAPY INITIAL EVALUATION ADULT - GENERAL OBSERVATIONS, REHAB EVAL
Pt received in semi-estrada position in bed with +IV lock, +telemetry, +Primafit, +bed alarm (2nd position), +4LNCO2. Pt agrees to OT.

## 2019-11-04 NOTE — DISCHARGE NOTE PROVIDER - CARE PROVIDERS DIRECT ADDRESSES
,DirectAddress_Unknown ,DirectAddress_Unknown,[email or web address]

## 2019-11-04 NOTE — OCCUPATIONAL THERAPY INITIAL EVALUATION ADULT - LEVEL OF INDEPENDENCE:TOILET, OT EVAL
+Primafit; pt with bowel incontinence during session, CNA made aware and in to clean pt/dependent (less than 25% patients effort)

## 2019-11-05 DIAGNOSIS — I50.9 HEART FAILURE, UNSPECIFIED: ICD-10-CM

## 2019-11-05 DIAGNOSIS — Z51.5 ENCOUNTER FOR PALLIATIVE CARE: ICD-10-CM

## 2019-11-05 DIAGNOSIS — R53.81 OTHER MALAISE: ICD-10-CM

## 2019-11-05 LAB
ALBUMIN SERPL ELPH-MCNC: 3.7 G/DL — SIGNIFICANT CHANGE UP (ref 3.3–5.2)
ALP SERPL-CCNC: 95 U/L — SIGNIFICANT CHANGE UP (ref 40–120)
ALT FLD-CCNC: 18 U/L — SIGNIFICANT CHANGE UP
ANION GAP SERPL CALC-SCNC: 12 MMOL/L — SIGNIFICANT CHANGE UP (ref 5–17)
ANION GAP SERPL CALC-SCNC: 13 MMOL/L — SIGNIFICANT CHANGE UP (ref 5–17)
ANISOCYTOSIS BLD QL: SLIGHT — SIGNIFICANT CHANGE UP
AST SERPL-CCNC: 21 U/L — SIGNIFICANT CHANGE UP
BASOPHILS # BLD AUTO: 0.04 K/UL — SIGNIFICANT CHANGE UP (ref 0–0.2)
BASOPHILS NFR BLD AUTO: 0.9 % — SIGNIFICANT CHANGE UP (ref 0–2)
BILIRUB SERPL-MCNC: 0.4 MG/DL — SIGNIFICANT CHANGE UP (ref 0.4–2)
BUN SERPL-MCNC: 58 MG/DL — HIGH (ref 8–20)
BUN SERPL-MCNC: 60 MG/DL — HIGH (ref 8–20)
BURR CELLS BLD QL SMEAR: PRESENT — SIGNIFICANT CHANGE UP
CALCIUM SERPL-MCNC: 9 MG/DL — SIGNIFICANT CHANGE UP (ref 8.6–10.2)
CALCIUM SERPL-MCNC: 9.4 MG/DL — SIGNIFICANT CHANGE UP (ref 8.6–10.2)
CHLORIDE SERPL-SCNC: 111 MMOL/L — HIGH (ref 98–107)
CHLORIDE SERPL-SCNC: 112 MMOL/L — HIGH (ref 98–107)
CO2 SERPL-SCNC: 19 MMOL/L — LOW (ref 22–29)
CO2 SERPL-SCNC: 20 MMOL/L — LOW (ref 22–29)
CREAT SERPL-MCNC: 2.43 MG/DL — HIGH (ref 0.5–1.3)
CREAT SERPL-MCNC: 2.44 MG/DL — HIGH (ref 0.5–1.3)
EOSINOPHIL # BLD AUTO: 0.04 K/UL — SIGNIFICANT CHANGE UP (ref 0–0.5)
EOSINOPHIL NFR BLD AUTO: 0.9 % — SIGNIFICANT CHANGE UP (ref 0–6)
GIANT PLATELETS BLD QL SMEAR: PRESENT — SIGNIFICANT CHANGE UP
GLUCOSE BLDC GLUCOMTR-MCNC: 123 MG/DL — HIGH (ref 70–99)
GLUCOSE BLDC GLUCOMTR-MCNC: 146 MG/DL — HIGH (ref 70–99)
GLUCOSE BLDC GLUCOMTR-MCNC: 180 MG/DL — HIGH (ref 70–99)
GLUCOSE BLDC GLUCOMTR-MCNC: 56 MG/DL — LOW (ref 70–99)
GLUCOSE SERPL-MCNC: 141 MG/DL — HIGH (ref 70–115)
GLUCOSE SERPL-MCNC: 66 MG/DL — LOW (ref 70–115)
HBA1C BLD-MCNC: 4.9 % — SIGNIFICANT CHANGE UP (ref 4–5.6)
HCT VFR BLD CALC: 33.6 % — LOW (ref 34.5–45)
HGB BLD-MCNC: 10.8 G/DL — LOW (ref 11.5–15.5)
LYMPHOCYTES # BLD AUTO: 0.97 K/UL — LOW (ref 1–3.3)
LYMPHOCYTES # BLD AUTO: 22.1 % — SIGNIFICANT CHANGE UP (ref 13–44)
MACROCYTES BLD QL: SLIGHT — SIGNIFICANT CHANGE UP
MAGNESIUM SERPL-MCNC: 2.6 MG/DL — SIGNIFICANT CHANGE UP (ref 1.6–2.6)
MAGNESIUM SERPL-MCNC: 2.7 MG/DL — HIGH (ref 1.6–2.6)
MANUAL SMEAR VERIFICATION: SIGNIFICANT CHANGE UP
MCHC RBC-ENTMCNC: 31.4 PG — SIGNIFICANT CHANGE UP (ref 27–34)
MCHC RBC-ENTMCNC: 32.1 GM/DL — SIGNIFICANT CHANGE UP (ref 32–36)
MCV RBC AUTO: 97.7 FL — SIGNIFICANT CHANGE UP (ref 80–100)
MONOCYTES # BLD AUTO: 0.62 K/UL — SIGNIFICANT CHANGE UP (ref 0–0.9)
MONOCYTES NFR BLD AUTO: 14.1 % — HIGH (ref 2–14)
NEUTROPHILS # BLD AUTO: 2.72 K/UL — SIGNIFICANT CHANGE UP (ref 1.8–7.4)
NEUTROPHILS NFR BLD AUTO: 61.1 % — SIGNIFICANT CHANGE UP (ref 43–77)
NEUTS BAND # BLD: 0.9 % — SIGNIFICANT CHANGE UP (ref 0–8)
OVALOCYTES BLD QL SMEAR: SLIGHT — SIGNIFICANT CHANGE UP
PLAT MORPH BLD: NORMAL — SIGNIFICANT CHANGE UP
PLATELET # BLD AUTO: 108 K/UL — LOW (ref 150–400)
POIKILOCYTOSIS BLD QL AUTO: SLIGHT — SIGNIFICANT CHANGE UP
POTASSIUM SERPL-MCNC: 4.9 MMOL/L — SIGNIFICANT CHANGE UP (ref 3.5–5.3)
POTASSIUM SERPL-MCNC: 5.6 MMOL/L — HIGH (ref 3.5–5.3)
POTASSIUM SERPL-SCNC: 4.9 MMOL/L — SIGNIFICANT CHANGE UP (ref 3.5–5.3)
POTASSIUM SERPL-SCNC: 5.6 MMOL/L — HIGH (ref 3.5–5.3)
PROT SERPL-MCNC: 6.7 G/DL — SIGNIFICANT CHANGE UP (ref 6.6–8.7)
RBC # BLD: 3.44 M/UL — LOW (ref 3.8–5.2)
RBC # FLD: 18.2 % — HIGH (ref 10.3–14.5)
RBC BLD AUTO: ABNORMAL
SODIUM SERPL-SCNC: 143 MMOL/L — SIGNIFICANT CHANGE UP (ref 135–145)
SODIUM SERPL-SCNC: 144 MMOL/L — SIGNIFICANT CHANGE UP (ref 135–145)
T3 SERPL-MCNC: 58 NG/DL — LOW (ref 80–200)
T4 AB SER-ACNC: 5.9 UG/DL — SIGNIFICANT CHANGE UP (ref 4.5–12)
TSH SERPL-MCNC: 5.65 UIU/ML — HIGH (ref 0.27–4.2)
WBC # BLD: 4.38 K/UL — SIGNIFICANT CHANGE UP (ref 3.8–10.5)
WBC # FLD AUTO: 4.38 K/UL — SIGNIFICANT CHANGE UP (ref 3.8–10.5)

## 2019-11-05 PROCEDURE — 99233 SBSQ HOSP IP/OBS HIGH 50: CPT

## 2019-11-05 PROCEDURE — 99221 1ST HOSP IP/OBS SF/LOW 40: CPT

## 2019-11-05 RX ORDER — SODIUM ZIRCONIUM CYCLOSILICATE 10 G/10G
10 POWDER, FOR SUSPENSION ORAL ONCE
Refills: 0 | Status: COMPLETED | OUTPATIENT
Start: 2019-11-05 | End: 2019-11-05

## 2019-11-05 RX ORDER — DEXTROSE 50 % IN WATER 50 %
12.5 SYRINGE (ML) INTRAVENOUS ONCE
Refills: 0 | Status: COMPLETED | OUTPATIENT
Start: 2019-11-05 | End: 2019-11-05

## 2019-11-05 RX ADMIN — Medication 25 MICROGRAM(S): at 05:08

## 2019-11-05 RX ADMIN — Medication 325 MILLIGRAM(S): at 08:19

## 2019-11-05 RX ADMIN — Medication 325 MILLIGRAM(S): at 18:05

## 2019-11-05 RX ADMIN — Medication 1 DROP(S): at 05:09

## 2019-11-05 RX ADMIN — Medication 100 MILLIGRAM(S): at 14:22

## 2019-11-05 RX ADMIN — LATANOPROST 1 DROP(S): 0.05 SOLUTION/ DROPS OPHTHALMIC; TOPICAL at 22:16

## 2019-11-05 RX ADMIN — Medication 12.5 GRAM(S): at 08:18

## 2019-11-05 RX ADMIN — Medication 650 MILLIGRAM(S): at 18:02

## 2019-11-05 RX ADMIN — SODIUM ZIRCONIUM CYCLOSILICATE 10 GRAM(S): 10 POWDER, FOR SUSPENSION ORAL at 09:26

## 2019-11-05 RX ADMIN — BRIMONIDINE TARTRATE 1 DROP(S): 2 SOLUTION/ DROPS OPHTHALMIC at 05:09

## 2019-11-05 RX ADMIN — HEPARIN SODIUM 5000 UNIT(S): 5000 INJECTION INTRAVENOUS; SUBCUTANEOUS at 05:08

## 2019-11-05 RX ADMIN — Medication 650 MILLIGRAM(S): at 19:02

## 2019-11-05 RX ADMIN — BRIMONIDINE TARTRATE 1 DROP(S): 2 SOLUTION/ DROPS OPHTHALMIC at 22:16

## 2019-11-05 RX ADMIN — SENNA PLUS 2 TABLET(S): 8.6 TABLET ORAL at 22:16

## 2019-11-05 RX ADMIN — BRIMONIDINE TARTRATE 1 DROP(S): 2 SOLUTION/ DROPS OPHTHALMIC at 14:22

## 2019-11-05 RX ADMIN — Medication 100 MILLIGRAM(S): at 05:08

## 2019-11-05 RX ADMIN — Medication 1 DROP(S): at 18:05

## 2019-11-05 NOTE — PHYSICAL THERAPY INITIAL EVALUATION ADULT - CRITERIA FOR SKILLED THERAPEUTIC INTERVENTIONS
anticipated equipment needs at discharge/therapy frequency/functional limitations in following categories/risk reduction/prevention/rehab potential/anticipated discharge recommendation/impairments found

## 2019-11-05 NOTE — CONSULT NOTE ADULT - SUBJECTIVE AND OBJECTIVE BOX
This is a Palliative Consult  <HPI:  89 year old female with PMH significant for chronic CHF, Glaucoma, HTN and hypothyroidism presented [facility] ED after sustaining a ground level fall earlier today. Patient states that she was getting up from her bed when she felt weak, loss her balance and fell. Patient could not provide further details regarding the incident. Patient taking ASA-81mg daily. Upon further workup, CT head and maxillofacial revealed comminuted fracture of right lateral orbital wall, hemorrhage within retrobulbar lateral extraconal space of right orbit as well as w nondisplaced fracture of right lateral maxillary wall.   While in the ED it was noted that the patient had increased IOP, Ophthalmology recommended lateral canthotomy and several eye drops to aid in reducing IOP. Patient with no major complaints other than right periorbital pain, mild headache but denies nausea nor emesis. (02 Nov 2019 22:31)> end of copied text    PERTINENT PMH REVIEWED: Yes   PAST MEDICAL & SURGICAL HISTORY:  Chronic CHF  Glaucoma  HTN (hypertension)  Breast cancer: s/p RT lumpectomy and radiation  Hypothyroid  S/P cataract extraction: bilateral  S/P lumpectomy, right breast      SOCIAL HISTORY:                                     Admitted from:  home    Surrogate/HCP/Guardian: Phone#: Morena Romero (daughter) [phone]    FAMILY HISTORY:  Family history of brain cancer: Brother  Family history of uterine cancer: Sister - cervical/uterine  Family hx of lung cancer: 2 Brothers      Baseline ADLs (prior to admission):  Independent/ Dependent      Allergies    codeine (Vomiting)  penicillin (Rash; Anaphylaxis)  Sulfacetamide Sodium (Rash)    Present Symptoms:   Dyspnea: 0 rest, 1-2 exertion  Nausea/Vomiting: No  Anxiety:  No  Depression: No  Fatigue: Yes   Loss of appetite: Yes     Pain: Denies pain but has fractures            Character-            Duration-            Effect-            Factors-            Frequency-            Location-            Severity-    Review of Systems: Reviewed                     Positive: SOB on exertion  All others negative    MEDICATIONS  (STANDING):  acetaminophen   Tablet .. 650 milliGRAM(s) Oral every 6 hours  brimonidine 0.2% Ophthalmic Solution 1 Drop(s) Right EYE every 8 hours  docusate sodium Oral Tab/Cap - Peds 100 milliGRAM(s) Oral three times a day  ferrous sulfate Oral Tab/Cap - Peds 325 milliGRAM(s) Oral two times a day with meals  influenza   Vaccine 0.5 milliLiter(s) IntraMuscular once  latanoprost 0.005% Ophthalmic Solution 1 Drop(s) Right EYE at bedtime  levothyroxine 25 MICROGram(s) Oral daily  lidocaine   Patch 1 Patch Transdermal daily  senna 2 Tablet(s) Oral at bedtime  timolol 0.5% Solution 1 Drop(s) Right EYE every 12 hours    PHYSICAL EXAM:    Vital Signs Last 24 Hrs  T(C): 36.4 (05 Nov 2019 09:55), Max: 37 (04 Nov 2019 15:52)  T(F): 97.6 (05 Nov 2019 09:55), Max: 98.6 (04 Nov 2019 15:52)  HR: 65 (05 Nov 2019 09:55) (60 - 73)  BP: 153/64 (05 Nov 2019 09:55) (86/40 - 186/62)  BP(mean): --  RR: 17 (05 Nov 2019 09:55) (16 - 17)  SpO2: 97% (05 Nov 2019 09:55) (94% - 97%)    General: alert  oriented x _3 forgetful     HEENT: dry mouth     Lungs: comfortable at rest, dyspnea on exertion     CV: normal      GI: incontinent     : incontinent     MSK: weakness  bedbound sustained fractures    Skin: thin frail skin, ecchymosis to face and body     LABS:                        10.8   4.38  )-----------( 108      ( 05 Nov 2019 06:17 )             33.6     11-05    144  |  112<H>  |  58.0<H>  ----------------------------<  141<H>  4.9   |  20.0<L>  |  2.43<H>    Ca    9.0      05 Nov 2019 10:22  Phos  4.6     11-04  Mg     2.6     11-05    TPro  6.7  /  Alb  3.7  /  TBili  0.4  /  DBili  x   /  AST  21  /  ALT  18  /  AlkPhos  95  11-05        I&O's Summary    04 Nov 2019 07:01  -  05 Nov 2019 07:00  --------------------------------------------------------  IN: 240 mL / OUT: 200 mL / NET: 40 mL    05 Nov 2019 07:01  -  05 Nov 2019 13:57  --------------------------------------------------------  IN: 120 mL / OUT: 800 mL / NET: -680 mL      RADIOLOGY & ADDITIONAL STUDIES:  Ct chest 11.02.19  IMPRESSION: Scattered areas of distal mucoid impacted airways.  Trace right pleural effusion.    ADVANCE DIRECTIVES:   DNR/I in sunrise, per Dr. Barahona this is from previous visits  Attempted to reach daughter to reconfirm This is a Palliative Consult  <HPI:  89 year old female with PMH significant for chronic CHF, Glaucoma, HTN and hypothyroidism presented [facility] ED after sustaining a ground level fall earlier today. Patient states that she was getting up from her bed when she felt weak, loss her balance and fell. Patient could not provide further details regarding the incident. Patient taking ASA-81mg daily. Upon further workup, CT head and maxillofacial revealed comminuted fracture of right lateral orbital wall, hemorrhage within retrobulbar lateral extraconal space of right orbit as well as w nondisplaced fracture of right lateral maxillary wall.   While in the ED it was noted that the patient had increased IOP, Ophthalmology recommended lateral canthotomy and several eye drops to aid in reducing IOP. Patient with no major complaints other than right periorbital pain, mild headache but denies nausea nor emesis. (02 Nov 2019 22:31)> end of copied text    PERTINENT PMH REVIEWED: Yes   PAST MEDICAL & SURGICAL HISTORY:  Chronic CHF  Glaucoma  HTN (hypertension)  Breast cancer: s/p RT lumpectomy and radiation  Hypothyroid  S/P cataract extraction: bilateral  S/P lumpectomy, right breast      SOCIAL HISTORY:                                     Admitted from:  home    Surrogate/HCP/Guardian: Phone#: Morena Romero (daughter) [phone]    FAMILY HISTORY:  Family history of brain cancer: Brother  Family history of uterine cancer: Sister - cervical/uterine  Family hx of lung cancer: 2 Brothers    Baseline ADLs (prior to admission):  Dependent      Allergies    codeine (Vomiting)  penicillin (Rash; Anaphylaxis)  Sulfacetamide Sodium (Rash)    Present Symptoms:   Dyspnea: 0 rest, 1-2 exertion  Nausea/Vomiting: No  Anxiety:  No  Depression: No  Fatigue: Yes   Loss of appetite: Yes     Pain: Denies pain but has fractures            Character-            Duration-            Effect-            Factors-            Frequency-            Location-            Severity-    Review of Systems: Reviewed                     Positive: SOB on exertion  All others negative    MEDICATIONS  (STANDING):  acetaminophen   Tablet .. 650 milliGRAM(s) Oral every 6 hours  brimonidine 0.2% Ophthalmic Solution 1 Drop(s) Right EYE every 8 hours  docusate sodium Oral Tab/Cap - Peds 100 milliGRAM(s) Oral three times a day  ferrous sulfate Oral Tab/Cap - Peds 325 milliGRAM(s) Oral two times a day with meals  influenza   Vaccine 0.5 milliLiter(s) IntraMuscular once  latanoprost 0.005% Ophthalmic Solution 1 Drop(s) Right EYE at bedtime  levothyroxine 25 MICROGram(s) Oral daily  lidocaine   Patch 1 Patch Transdermal daily  senna 2 Tablet(s) Oral at bedtime  timolol 0.5% Solution 1 Drop(s) Right EYE every 12 hours    PHYSICAL EXAM:    Vital Signs Last 24 Hrs  T(C): 36.4 (05 Nov 2019 09:55), Max: 37 (04 Nov 2019 15:52)  T(F): 97.6 (05 Nov 2019 09:55), Max: 98.6 (04 Nov 2019 15:52)  HR: 65 (05 Nov 2019 09:55) (60 - 73)  BP: 153/64 (05 Nov 2019 09:55) (86/40 - 186/62)  BP(mean): --  RR: 17 (05 Nov 2019 09:55) (16 - 17)  SpO2: 97% (05 Nov 2019 09:55) (94% - 97%)    General: alert  oriented x _3 forgetful     HEENT: dry mouth     Lungs: comfortable at rest, dyspnea on exertion     CV: normal      GI: incontinent     : incontinent     MSK: weakness  bedbound sustained fractures    Skin: thin frail skin, ecchymosis to face and body     LABS:                        10.8   4.38  )-----------( 108      ( 05 Nov 2019 06:17 )             33.6     11-05    144  |  112<H>  |  58.0<H>  ----------------------------<  141<H>  4.9   |  20.0<L>  |  2.43<H>    Ca    9.0      05 Nov 2019 10:22  Phos  4.6     11-04  Mg     2.6     11-05    TPro  6.7  /  Alb  3.7  /  TBili  0.4  /  DBili  x   /  AST  21  /  ALT  18  /  AlkPhos  95  11-05        I&O's Summary    04 Nov 2019 07:01  -  05 Nov 2019 07:00  --------------------------------------------------------  IN: 240 mL / OUT: 200 mL / NET: 40 mL    05 Nov 2019 07:01  -  05 Nov 2019 13:57  --------------------------------------------------------  IN: 120 mL / OUT: 800 mL / NET: -680 mL      RADIOLOGY & ADDITIONAL STUDIES:  Ct chest 11.02.19  IMPRESSION: Scattered areas of distal mucoid impacted airways.  Trace right pleural effusion.    ADVANCE DIRECTIVES:   DNR/I MOLST established in april of 2019 This is a Palliative Consult - This is a 89 year old female PMHx CHF, HTN, breast CA (post lumpectomy/Radiation) admitted to [facility] s/p fall. Patient sustained maxillofacial fracture of orbital wall, hemorrhage within right retrobulbar lateral extraconal space orbit and a non displaced fracture of right lateral maxillary wall. Also found to have increased IOP - opthalmology consulted recommending  lateral canthotomy and several eye drops to aid in reducing IOP  Hospitalist met with daughter previously to our encounter in which she explained she doesn't want any aggressive measures at this time     <HPI:  89 year old female with PMH significant for chronic CHF, Glaucoma, HTN and hypothyroidism presented [facility] ED after sustaining a ground level fall earlier today. Patient states that she was getting up from her bed when she felt weak, loss her balance and fell. Patient could not provide further details regarding the incident. Patient taking ASA-81mg daily. Upon further workup, CT head and maxillofacial revealed comminuted fracture of right lateral orbital wall, hemorrhage within retrobulbar lateral extraconal space of right orbit as well as w nondisplaced fracture of right lateral maxillary wall.   While in the ED it was noted that the patient had increased IOP, Ophthalmology recommended lateral canthotomy and several eye drops to aid in reducing IOP. Patient with no major complaints other than right periorbital pain, mild headache but denies nausea nor emesis. (02 Nov 2019 22:31)> end of copied text    PERTINENT PMH REVIEWED: Yes   PAST MEDICAL & SURGICAL HISTORY:  Chronic CHF  Glaucoma  HTN (hypertension)  Breast cancer: s/p RT lumpectomy and radiation  Hypothyroid  S/P cataract extraction: bilateral  S/P lumpectomy, right breast      SOCIAL HISTORY:                                     Admitted from:  home    Surrogate/HCP/Guardian: Phone#: Morena Romero (daughter) [phone]    FAMILY HISTORY:  Family history of brain cancer: Brother  Family history of uterine cancer: Sister - cervical/uterine  Family hx of lung cancer: 2 Brothers    Baseline ADLs (prior to admission):  Dependent      Allergies    codeine (Vomiting)  penicillin (Rash; Anaphylaxis)  Sulfacetamide Sodium (Rash)    Present Symptoms:   Dyspnea: 0 rest, 1-2 exertion  Nausea/Vomiting: No  Anxiety:  No  Depression: No  Fatigue: Yes   Loss of appetite: Yes     Pain: Denies pain but has fractures            Character-            Duration-            Effect-            Factors-            Frequency-            Location-            Severity-    Review of Systems: Reviewed                     Positive: SOB on exertion  All others negative    MEDICATIONS  (STANDING):  acetaminophen   Tablet .. 650 milliGRAM(s) Oral every 6 hours  brimonidine 0.2% Ophthalmic Solution 1 Drop(s) Right EYE every 8 hours  docusate sodium Oral Tab/Cap - Peds 100 milliGRAM(s) Oral three times a day  ferrous sulfate Oral Tab/Cap - Peds 325 milliGRAM(s) Oral two times a day with meals  influenza   Vaccine 0.5 milliLiter(s) IntraMuscular once  latanoprost 0.005% Ophthalmic Solution 1 Drop(s) Right EYE at bedtime  levothyroxine 25 MICROGram(s) Oral daily  lidocaine   Patch 1 Patch Transdermal daily  senna 2 Tablet(s) Oral at bedtime  timolol 0.5% Solution 1 Drop(s) Right EYE every 12 hours    PHYSICAL EXAM:    Vital Signs Last 24 Hrs  T(C): 36.4 (05 Nov 2019 09:55), Max: 37 (04 Nov 2019 15:52)  T(F): 97.6 (05 Nov 2019 09:55), Max: 98.6 (04 Nov 2019 15:52)  HR: 65 (05 Nov 2019 09:55) (60 - 73)  BP: 153/64 (05 Nov 2019 09:55) (86/40 - 186/62)  BP(mean): --  RR: 17 (05 Nov 2019 09:55) (16 - 17)  SpO2: 97% (05 Nov 2019 09:55) (94% - 97%)    General: alert  oriented x _3 forgetful     HEENT: dry mouth     Lungs: comfortable at rest, dyspnea on exertion     CV: normal      GI: incontinent     : incontinent     MSK: weakness  bedbound sustained fractures    Skin: thin frail skin, ecchymosis to face and body     LABS:                        10.8   4.38  )-----------( 108      ( 05 Nov 2019 06:17 )             33.6     11-05    144  |  112<H>  |  58.0<H>  ----------------------------<  141<H>  4.9   |  20.0<L>  |  2.43<H>    Ca    9.0      05 Nov 2019 10:22  Phos  4.6     11-04  Mg     2.6     11-05    TPro  6.7  /  Alb  3.7  /  TBili  0.4  /  DBili  x   /  AST  21  /  ALT  18  /  AlkPhos  95  11-05        I&O's Summary    04 Nov 2019 07:01  -  05 Nov 2019 07:00  --------------------------------------------------------  IN: 240 mL / OUT: 200 mL / NET: 40 mL    05 Nov 2019 07:01  -  05 Nov 2019 13:57  --------------------------------------------------------  IN: 120 mL / OUT: 800 mL / NET: -680 mL      RADIOLOGY & ADDITIONAL STUDIES:  Ct chest 11.02.19  IMPRESSION: Scattered areas of distal mucoid impacted airways.  Trace right pleural effusion.    ADVANCE DIRECTIVES:   DNR/I MOLST established in april of 2019

## 2019-11-05 NOTE — PROGRESS NOTE ADULT - ASSESSMENT
89 year old female with PMH HTN, hypothyroidism, Glaucoma presented with facial fractures after fall - unclear circumstances of fall.     Orbital fracture - No intervention  - Continue Tylenol    Fall, pause on monitoring  - Continue to monitor  - PT evaluation  - Cardiology note appreciated    Hyperkalemia, likely secondary to hemolysis from fall  - Received Lokelma with resolution    CKD 4  - IVF  - Monitor SCr, K    HTN  - No medications    Hypothyroidism  - Continue Synthroid    Anemia   - Continue Iron supplement  - monitor Hgb    Glaucoma  - Continue eye drops    Prognosis - Guarded.    Discussed with grand daughter at bedside - patient and daughter do not want aggressive measure.  Awaiting palliative, Hospice evaluations

## 2019-11-05 NOTE — PHYSICAL THERAPY INITIAL EVALUATION ADULT - PERTINENT HX OF CURRENT PROBLEM, REHAB EVAL
89 year old female with PMH significant for chronic CHF, Glaucoma, HTN and hypothyroidism presented [facility] ED after sustaining a ground level fall earlier today. Patient states that she was getting up from her bed when she felt weak, loss her balance and fell.  CT head and maxillofacial revealed comminuted fracture of right lateral orbital wall, hemorrhage within retrobulbar lateral extraconal space of right orbit as well as w nondisplaced fracture of right lateral maxillary wall.

## 2019-11-05 NOTE — CONSULT NOTE ADULT - PROBLEM SELECTOR RECOMMENDATION 9
Maintain safety and fall precautions  Sustained fractures Maintain safety and fall precautions  Sustained multiple fractures Cardiology following, continue with CV medications   Monitor vitals and I+O

## 2019-11-05 NOTE — CONSULT NOTE ADULT - PROBLEM SELECTOR RECOMMENDATION 4
- Met with patient at bedside who is alert and oriented, hard of hearing  - Patient defers to her daughter Morena to speak with regarding her health care decisions and conversations  - Call placed to daughter, no response. Attempting to reach daughter in order to re-confirm the DNR/DNI order that is actively in sunrise.   -Spoke with Paula PRATHER from [facility] who met with the grand daughter at bedside and was provided another contact number for her daughter. She as well has not received a call back at this time. In the past it appears the patient was on hospice services.   - Will continue to support and monitor    Total time spent 20 minutes    Thank you for the opportunity to assist with the care of this patient.   [place] [facility] Consult Service [phone]. - Met with patient at bedside who is alert and oriented, hard of hearing  - Patient defers to her daughter Morena to speak with regarding her health care decisions and conversations - when asking more complex and in depth questions, patient becomes forgetful.  - Call placed to daughter, no response. Attempting to reach daughter in order to re-confirm the DNR/DNI. MOLST was established by our Palliative Care Team Attending physician in April of 2019. I was able to find the copy in Alpha from a previous visit.   -Spoke with Paula PRATHER from [facility] who met with the grand daughter at bedside and was provided another contact number for her daughter. She as well has not received a call back at this time. In the past it appears the patient was on hospice services.   - Spoke with Dr. Barahona who informed me that he had met with the daughter during which time she expressed she didn't want any aggressive measures.   - Will continue to support and monitor and re-confirm goals    Total time spent 30 minutes  Total time spent 20 minutes    Thank you for the opportunity to assist with the care of this patient.   [place] [facility] Consult Service [phone]. - Met with patient at bedside who is alert and oriented, hard of hearing  - Patient defers to her daughter Morena to speak with regarding her health care decisions and conversations - when asking more complex and in depth questions, patient becomes forgetful.  - Call placed to daughter, no response. Attempting to reach daughter in order to re-confirm the DNR/DNI. MOLST was established by our Palliative Care Team Attending physician in April of 2019. I was able to find the copy in Alpha from a previous visit.   -Spoke with Paula PRATHER from [facility] who met with the grand daughter at bedside and was provided another contact number for her daughter. She as well has not received a call back at this time. In the past it appears the patient was on hospice services.   - Spoke with Dr. Barahona who informed me that he had met with the daughter during which time she expressed she didn't want any aggressive measures.   - Will continue to support and monitor and re-confirm goals    Total time spent 30 minutes    Thank you for the opportunity to assist with the care of this patient.   [place] [facility] Consult Service [phone]. - Met with patient at bedside who is alert and oriented, hard of hearing  - Patient defers to her daughter Morena to speak with regarding her health care decisions and conversations - when asking more complex and in depth questions, patient becomes forgetful.  - Call placed to daughter, no response. Attempting to reach daughter in order to re-confirm the DNR/DNI. MOLST was established by our Palliative Care Team Attending physician in April of 2019. I was able to find the copy in Alpha from a previous visit.   -Spoke with Paula PRATHER from [facility] who met with the grand daughter at bedside and was provided another contact number for her daughter. She as well has not received a call back at this time. In the past it appears the patient was on hospice services.   - Will continue to support and monitor and re-confirm goals    Total time spent 30 minutes    Thank you for the opportunity to assist with the care of this patient.   [place] [facility] Consult Service [phone].

## 2019-11-05 NOTE — CONSULT NOTE ADULT - PROBLEM SELECTOR RECOMMENDATION 2
Cardiology following, continue with CV medications   Monitor vitals and I+O Maintain safety and fall precautions  Sustained multiple fractures

## 2019-11-05 NOTE — PROGRESS NOTE ADULT - SUBJECTIVE AND OBJECTIVE BOX
HOSPITALIST PROGRESS NOTE    LUIS MONET  [id number]  89yFemale    Patient is a 89y old  Female who presents with a chief complaint of Fall (2019 13:49)      SUBJECTIVE:   Chart reviewed since last visit.  Patient seen and examined at bedside for fall, orbital fracture, Hyperkalemia, Renal failure HTN  Denies any headache, dizziness, chest pain, palpitations.    Per NP hyperkalemia hypoglycemia in am treated with dextrose and Lokelma      OBJECTIVE:  Vital Signs Last 24 Hrs  T(C): 36.4 (2019 09:55), Max: 37 (2019 15:52)  T(F): 97.6 (2019 09:55), Max: 98.6 (2019 15:52)  HR: 65 (2019 09:55) (60 - 73)  BP: 153/64 (2019 09:55) (86/40 - 186/62)   RR: 17 (2019 09:55) (16 - 17)  SpO2: 97% (2019 09:55) (94% - 97%)    PHYSICAL EXAMINATION  General: Lying in bed, NAD  HEENT:  Significant Right periorbital ecchymoses, EOMI  NECK:  Ecchymosis suprasternal region  CVS: regular rate and rhythm S1 S2 4/6 ESM  RESP:  Fair air entry  GI:  Soft nondistended NT BS+  : No suprapubic tenderness  MSK:  FROM, non edema  CNS:  Awake, oriented to person. Follows commands  INTEG:  Multiple ecchymosis  PSYCH:  Fair mood    MONITOR: SR with 2 sec pause  CAPILLARY BLOOD GLUCOSE      POCT Blood Glucose.: 180 mg/dL (2019 12:01)  POCT Blood Glucose.: 146 mg/dL (2019 08:13)  POCT Blood Glucose.: 56 mg/dL (2019 07:34)        I&O's Summary    2019 07:  -  2019 07:00  --------------------------------------------------------  IN: 240 mL / OUT: 200 mL / NET: 40 mL    2019 07:01  -  2019 14:41  --------------------------------------------------------  IN: 120 mL / OUT: 800 mL / NET: -680 mL                            10.8   4.38  )-----------( 108      ( 2019 06:17 )             33.6       11-    144  |  112<H>  |  58.0<H>  ----------------------------<  141<H>  4.9   |  20.0<L>  |  2.43<H>    Ca    9.0      2019 10:22  Phos  4.6     11-  Mg     2.6         TPro  6.7  /  Alb  3.7  /  TBili  0.4  /  DBili  x   /  AST  21  /  ALT  18  /  AlkPhos  95              Culture:    TTE:    RADIOLOGY  < from: CT Chest No Cont (19 @ 19:10) >     EXAM:  CT CHEST                          PROCEDURE DATE:  2019          INTERPRETATION:  CLINICAL INFORMATION: Shortness of breath. Status post   fall.    COMPARISON: CT chest 2019    PROCEDURE:   CT of the Chest was performed without intravenous contrast.  Sagittal and coronal reformats were performed.      FINDINGS:    CHEST:     LUNGS AND LARGE AIRWAYS: Debris in the trachea and mainstem bronchi   extending into right middle and lower lobar through subsegmental   branches. Biapical scarring. Scattered calcified granulomas. Scattered   lower lobe tree-in-bud nodular opacities likely mucoid impacted airways..  PLEURA: Trace right pleural effusion. No pneumothorax.  VESSELS: Within normal limits.  HEART: Heart size is normal.No pericardial effusion.  MEDIASTINUM AND MARIELOS: No lymphadenopathy.  CHEST WALL AND LOWER NECK: Within normal limits.  VISUALIZED UPPER ABDOMEN: Fundal gastric diverticulum.  BONES: Stable degenerative changes of the spine and stable compression   deformities of T9 and T10. No acute fracture.    IMPRESSION: Scattered areas of distal mucoid impacted airways.    Trace right pleural effusion.                      JOHNATHON HUTCHINSON M.D., ATTENDING RADIOLOGIST  This document has been electronically signed. 2019  7:23PM        < end of copied text >    < from: TTE Echo Complete w/Doppler (19 @ 12:33) >  EXAM:  ECHO TRANSTHORACIC COMP W DOPP      PROCEDURE DATE:  2019   .      INTERPRETATION:  REPORT:    TRANSTHORACIC ECHOCARDIOGRAM REPORT         Patient Name:   LUIS MONET Patient Location: [facility] Rec #:  [id number]           Accession #:      [id number]  Account #:                             Height:           63.8 in 162.0 cm  YOB: 1922              Weight:           130.1 lb 59.00 kg  Patient Age:    89 years               BSA:              1.63 m²  Patient Gender: F                      BP:               130/67 mmHg       Date of Exam:        2019 12:33:36 PM  Sonographer:         Edith Mccallum  Referring Physician: Loreto Mooney MD    Procedure:     2D Echo/Doppler/Color Doppler Complete.  Indications:   Dyspnea, unspecified - R06.00  Diagnosis:     Dyspnea, unspecified - R06.00  Study Details: Technically adequate study.         2D AND M-MODE MEASUREMENTS (normal ranges within parentheses):  Left Ventricle: Normal   Aorta/Left Atrium:              Normal  IVSd (2D):              0.90 cm (0.7-1.1) LA Volume Index    34.1 ml/m²  LVPWd (2D):             1.07 cm (0.7-1.1) Right Ventricle:  LVIDd (2D):             3.64 cm (3.4-5.7) TAPSE:           1.98 cm  LVIDs (2D):             2.17 cm  LV FS (2D):             40.4 %   (>25%)  Relative Wall Thickness  0.59    (<0.42)    LV SYSTOLIC FUNCTION BY 2D PLANIMETRY (MOD):  EF-A4C View: 76.9 % EF-A2C View: 64.7 % EF-Biplane: 71 %    LV DIASTOLIC FUNCTION:  MVPeak E: 0.76 m/s E/e' Ratio: 11.80  MV Peak A: 1.28 m/s Decel Time: 246 msec  E/A Ratio: 0.59    SPECTRAL DOPPLER ANALYSIS (where applicable):  Mitral Valve:  MV Mean Grad: 3.0 mmHg MV P1/2 Time: 71.34 msec                         MV Area, PHT: 3.08 cm²    Aortic Valve: AoV Max Lucio: 2.50 m/s AoV Peak P.9 mmHg AoV Mean P.5 mmHg    LVOT Vmax: 1.03 m/s LVOT VTI: 0.229 m LVOT Diameter: 1.80 cm    AoV Area, Vmax: 1.05 cm² AoV Area, VTI: 1.07 cm² AoV Area, Vmn: 1.00 cm²  Ao VTI: 0.543  Tricuspid Valve and PA/RV Systolic Pressure: TR Max Velocity: 2.67 m/s RA   Pressure: 3 mmHg RVSP/PASP: 31.5 mmHg       PHYSICIAN INTERPRETATION:  Left Ventricle: The left ventricular internal cavity size is normal.  Global LV systolic function was hyperdynamic. Left ventricular ejection   fraction, by visual estimation, is 70 to 75%. Spectral Doppler shows   impaired relaxation pattern of left ventricular myocardial filling (Grade   I diastolic dysfunction).  Right Ventricle: Normal right ventricularsize and function.  Left Atrium: Mildly enlarged left atrium.  Right Atrium: The right atrium is normal in size. Normal right atrial   size.  Pericardium: There is no evidence of pericardial effusion. There is a   small pleural effusion in both left and right lateral regions.  Mitral Valve: There is moderate mitral annular calcification. Peak   transmitral mean gradient equals 3.0 mmHg, calculated mitral valve area   by pressure half time equals 3.08 cm² consistent with No evidence of   mitral stenosis. Mitral valve mean gradient is 3.0 mmHg consistent with   mild mitral stenosis. Trace mitral valve regurgitation is seen.  Tricuspid Valve: The tricuspid valve is normal in structure. Mild   tricuspid regurgitation is visualized.  Aortic Valve:The aortic valve is trileaflet. Mild aortic stenosis is   present. No evidence of aortic valve regurgitation is seen.  Pulmonic Valve: The pulmonic valve was not well visualized. No indication   of pulmonic valve regurgitation.  Aorta: The aortic root is normal in size and structure.  Pulmonary Artery: The pulmonary artery is not well seen.  Venous: A normal flow pattern is recorded from the right upper pulmonary   vein. The inferior vena cava was normal sized, with respiratory size   variation greater than 50%.     In comparison to the previous echocardiogram(s): Prior examinations are   available and were reviewed for comparison purposes.       Summary:   1. Technically adequate study.   2. Normal left ventricular internal cavity size.   3.Hyperdynamic global left ventricular systolic function.   4. Left ventricular ejection fraction, by visual estimation, is 70 to   75%.   5. Spectral Doppler shows impaired relaxation pattern of left   ventricular myocardial filling (Grade I diastolicdysfunction).   6. Normal right ventricular size and function.   7. Mild aortic valve stenosis.   8. Mitral valve mean gradient is 3.0 mmHg consistent with mild mitral   stenosis.   9. Moderate mitral annular calcification.  10. The right atrium is normal in size.  11. There is no evidence of pericardial effusion.    MD Mauro Electronically signed on 2019 at 3:46:28 PM       < end of copied text >      MEDICATIONS  (STANDING):  acetaminophen   Tablet .. 650 milliGRAM(s) Oral every 6 hours  brimonidine 0.2% Ophthalmic Solution 1 Drop(s) Right EYE every 8 hours  docusate sodium Oral Tab/Cap - Peds 100 milliGRAM(s) Oral three times a day  ferrous sulfate Oral Tab/Cap - Peds 325 milliGRAM(s) Oral two times a day with meals  influenza   Vaccine 0.5 milliLiter(s) IntraMuscular once  latanoprost 0.005% Ophthalmic Solution 1 Drop(s) Right EYE at bedtime  levothyroxine 25 MICROGram(s) Oral daily  lidocaine   Patch 1 Patch Transdermal daily  senna 2 Tablet(s) Oral at bedtime  timolol 0.5% Solution 1 Drop(s) Right EYE every 12 hours      MEDICATIONS  (PRN):

## 2019-11-05 NOTE — GOALS OF CARE CONVERSATION - ADVANCED CARE PLANNING - CONVERSATION DETAILS
[facility] RN attempted to meet with patient's daughter, Morena, at patient bedside. Patient's granddaughter at bedside, gave writer daughter Morena's phone number at work - writer left message with Morena at work and is awaiting call back. Patient had previously been receiving hospice services through [facility] but stopped receiving services in June of 2019. Will continue to follow.     Paula Tavera RN  [phone]

## 2019-11-05 NOTE — CHART NOTE - NSCHARTNOTEFT_GEN_A_CORE
HPI:  89 year old female with PMH significant for chronic CHF, Glaucoma, HTN and hypothyroidism presented [facility] ED after sustaining a ground level fall earlier today. Patient states that she was getting up from her bed when she felt weak, loss her balance and fell. Patient could not provide further details regarding the incident. Patient taking ASA-81mg daily. Upon further workup, CT head and maxillofacial revealed comminuted fracture of right lateral orbital wall, hemorrhage within retrobulbar lateral extraconal space of right orbit as well as w nondisplaced fracture of right lateral maxillary wall.     While in the ED it was noted that the patient had increased IOP, Ophthalmology recommended lateral canthotomy and several eye drops to aid in reducing IOP. Patient with no major complaints other than right periorbital pain, mild headache but denies nausea nor emesis. (02 Nov 2019 22:31)    Interval HPI: Patient with 800mL urinary retention, hyperkalemia, and hypoglycemia. no acute distress    Plan:   Repeat labs @ 10am   POCTBG q15min until >100 and q6h after  Dextrose 12.5g x 1 dose  lokelma 10g x 1 dose   pedroza for urinary retention  Dr. Barahona is aware

## 2019-11-05 NOTE — PHYSICAL THERAPY INITIAL EVALUATION ADULT - ADDITIONAL COMMENTS
pt states she lives with her daughter in a 2-story house with no steps to enter and none inside. states she stays on first floor. pt states she uses a cane for amb, has a RW, shower chair, and wheelchair. pt states her daughter works and that she is alone for periods during the day.

## 2019-11-05 NOTE — CONSULT NOTE ADULT - ASSESSMENT
This is a 89 year old female PMHx CHF, HTN, breast CA (post lumpectomy/Radiation) admitted to [facility] s/p fall. Patient sustained maxillofacial fracture of orbital wall, hemorrhage within right retrobulbar lateral extraconal space orbit and a non displaced fracture of right lateral maxillary wall. Also found to have increased IOP - opthalmology consulted recommending  lateral canthotomy and several eye drops to aid in reducing IOP  Hospitalist met with daughter previously to our encounter in which she explained she doesn't want any aggressive measures at this time

## 2019-11-06 LAB
ALBUMIN SERPL ELPH-MCNC: 3.2 G/DL — LOW (ref 3.3–5.2)
ALP SERPL-CCNC: 92 U/L — SIGNIFICANT CHANGE UP (ref 40–120)
ALT FLD-CCNC: 26 U/L — SIGNIFICANT CHANGE UP
ANION GAP SERPL CALC-SCNC: 12 MMOL/L — SIGNIFICANT CHANGE UP (ref 5–17)
ANION GAP SERPL CALC-SCNC: 12 MMOL/L — SIGNIFICANT CHANGE UP (ref 5–17)
APPEARANCE UR: CLEAR — SIGNIFICANT CHANGE UP
AST SERPL-CCNC: 41 U/L — HIGH
BACTERIA # UR AUTO: ABNORMAL
BASOPHILS # BLD AUTO: 0.02 K/UL — SIGNIFICANT CHANGE UP (ref 0–0.2)
BASOPHILS NFR BLD AUTO: 0.4 % — SIGNIFICANT CHANGE UP (ref 0–2)
BILIRUB SERPL-MCNC: 0.4 MG/DL — SIGNIFICANT CHANGE UP (ref 0.4–2)
BILIRUB UR-MCNC: NEGATIVE — SIGNIFICANT CHANGE UP
BUN SERPL-MCNC: 56 MG/DL — HIGH (ref 8–20)
BUN SERPL-MCNC: 59 MG/DL — HIGH (ref 8–20)
CALCIUM SERPL-MCNC: 9 MG/DL — SIGNIFICANT CHANGE UP (ref 8.6–10.2)
CALCIUM SERPL-MCNC: 9 MG/DL — SIGNIFICANT CHANGE UP (ref 8.6–10.2)
CHLORIDE SERPL-SCNC: 112 MMOL/L — HIGH (ref 98–107)
CHLORIDE SERPL-SCNC: 114 MMOL/L — HIGH (ref 98–107)
CO2 SERPL-SCNC: 21 MMOL/L — LOW (ref 22–29)
CO2 SERPL-SCNC: 21 MMOL/L — LOW (ref 22–29)
COLOR SPEC: YELLOW — SIGNIFICANT CHANGE UP
CREAT SERPL-MCNC: 2.4 MG/DL — HIGH (ref 0.5–1.3)
CREAT SERPL-MCNC: 2.48 MG/DL — HIGH (ref 0.5–1.3)
DIFF PNL FLD: ABNORMAL
EOSINOPHIL # BLD AUTO: 0.07 K/UL — SIGNIFICANT CHANGE UP (ref 0–0.5)
EOSINOPHIL NFR BLD AUTO: 1.4 % — SIGNIFICANT CHANGE UP (ref 0–6)
EPI CELLS # UR: SIGNIFICANT CHANGE UP
GLUCOSE BLDC GLUCOMTR-MCNC: 100 MG/DL — HIGH (ref 70–99)
GLUCOSE BLDC GLUCOMTR-MCNC: 121 MG/DL — HIGH (ref 70–99)
GLUCOSE BLDC GLUCOMTR-MCNC: 131 MG/DL — HIGH (ref 70–99)
GLUCOSE BLDC GLUCOMTR-MCNC: 216 MG/DL — HIGH (ref 70–99)
GLUCOSE BLDC GLUCOMTR-MCNC: 45 MG/DL — CRITICAL LOW (ref 70–99)
GLUCOSE BLDC GLUCOMTR-MCNC: 69 MG/DL — LOW (ref 70–99)
GLUCOSE BLDC GLUCOMTR-MCNC: 71 MG/DL — SIGNIFICANT CHANGE UP (ref 70–99)
GLUCOSE BLDC GLUCOMTR-MCNC: 83 MG/DL — SIGNIFICANT CHANGE UP (ref 70–99)
GLUCOSE SERPL-MCNC: 128 MG/DL — HIGH (ref 70–115)
GLUCOSE SERPL-MCNC: 61 MG/DL — LOW (ref 70–115)
GLUCOSE UR QL: NEGATIVE — SIGNIFICANT CHANGE UP
HCT VFR BLD CALC: 30.5 % — LOW (ref 34.5–45)
HGB BLD-MCNC: 9.6 G/DL — LOW (ref 11.5–15.5)
IMM GRANULOCYTES NFR BLD AUTO: 0.2 % — SIGNIFICANT CHANGE UP (ref 0–1.5)
KETONES UR-MCNC: NEGATIVE — SIGNIFICANT CHANGE UP
LACTATE SERPL-SCNC: 0.6 MMOL/L — SIGNIFICANT CHANGE UP (ref 0.5–2)
LEUKOCYTE ESTERASE UR-ACNC: ABNORMAL
LYMPHOCYTES # BLD AUTO: 0.74 K/UL — LOW (ref 1–3.3)
LYMPHOCYTES # BLD AUTO: 15 % — SIGNIFICANT CHANGE UP (ref 13–44)
MAGNESIUM SERPL-MCNC: 2.5 MG/DL — SIGNIFICANT CHANGE UP (ref 1.8–2.6)
MCHC RBC-ENTMCNC: 31.5 GM/DL — LOW (ref 32–36)
MCHC RBC-ENTMCNC: 31.5 PG — SIGNIFICANT CHANGE UP (ref 27–34)
MCV RBC AUTO: 100 FL — SIGNIFICANT CHANGE UP (ref 80–100)
MONOCYTES # BLD AUTO: 0.67 K/UL — SIGNIFICANT CHANGE UP (ref 0–0.9)
MONOCYTES NFR BLD AUTO: 13.6 % — SIGNIFICANT CHANGE UP (ref 2–14)
NEUTROPHILS # BLD AUTO: 3.41 K/UL — SIGNIFICANT CHANGE UP (ref 1.8–7.4)
NEUTROPHILS NFR BLD AUTO: 69.4 % — SIGNIFICANT CHANGE UP (ref 43–77)
NITRITE UR-MCNC: POSITIVE
PH UR: 5 — SIGNIFICANT CHANGE UP (ref 5–8)
PHOSPHATE SERPL-MCNC: 3.7 MG/DL — SIGNIFICANT CHANGE UP (ref 2.4–4.7)
PLATELET # BLD AUTO: 108 K/UL — LOW (ref 150–400)
POTASSIUM SERPL-MCNC: 5 MMOL/L — SIGNIFICANT CHANGE UP (ref 3.5–5.3)
POTASSIUM SERPL-MCNC: 5 MMOL/L — SIGNIFICANT CHANGE UP (ref 3.5–5.3)
POTASSIUM SERPL-SCNC: 5 MMOL/L — SIGNIFICANT CHANGE UP (ref 3.5–5.3)
POTASSIUM SERPL-SCNC: 5 MMOL/L — SIGNIFICANT CHANGE UP (ref 3.5–5.3)
PROCALCITONIN SERPL-MCNC: 0.1 NG/ML — SIGNIFICANT CHANGE UP (ref 0.02–0.1)
PROT SERPL-MCNC: 6.3 G/DL — LOW (ref 6.6–8.7)
PROT UR-MCNC: 100
RBC # BLD: 3.05 M/UL — LOW (ref 3.8–5.2)
RBC # FLD: 17.4 % — HIGH (ref 10.3–14.5)
RBC CASTS # UR COMP ASSIST: ABNORMAL /HPF (ref 0–4)
SODIUM SERPL-SCNC: 145 MMOL/L — SIGNIFICANT CHANGE UP (ref 135–145)
SODIUM SERPL-SCNC: 147 MMOL/L — HIGH (ref 135–145)
SP GR SPEC: 1.01 — SIGNIFICANT CHANGE UP (ref 1.01–1.02)
TSH SERPL-MCNC: 5.29 UIU/ML — HIGH (ref 0.27–4.2)
UROBILINOGEN FLD QL: NEGATIVE — SIGNIFICANT CHANGE UP
WBC # BLD: 4.92 K/UL — SIGNIFICANT CHANGE UP (ref 3.8–10.5)
WBC # FLD AUTO: 4.92 K/UL — SIGNIFICANT CHANGE UP (ref 3.8–10.5)
WBC UR QL: ABNORMAL

## 2019-11-06 PROCEDURE — 99233 SBSQ HOSP IP/OBS HIGH 50: CPT

## 2019-11-06 PROCEDURE — 71045 X-RAY EXAM CHEST 1 VIEW: CPT | Mod: 26

## 2019-11-06 RX ORDER — DEXTROSE 50 % IN WATER 50 %
50 SYRINGE (ML) INTRAVENOUS ONCE
Refills: 0 | Status: COMPLETED | OUTPATIENT
Start: 2019-11-06 | End: 2019-11-06

## 2019-11-06 RX ORDER — HYDRALAZINE HCL 50 MG
25 TABLET ORAL ONCE
Refills: 0 | Status: COMPLETED | OUTPATIENT
Start: 2019-11-06 | End: 2019-11-06

## 2019-11-06 RX ORDER — SODIUM CHLORIDE 9 MG/ML
1000 INJECTION, SOLUTION INTRAVENOUS
Refills: 0 | Status: DISCONTINUED | OUTPATIENT
Start: 2019-11-06 | End: 2019-11-08

## 2019-11-06 RX ORDER — DEXTROSE 50 % IN WATER 50 %
15 SYRINGE (ML) INTRAVENOUS ONCE
Refills: 0 | Status: COMPLETED | OUTPATIENT
Start: 2019-11-06 | End: 2019-11-06

## 2019-11-06 RX ADMIN — Medication 650 MILLIGRAM(S): at 05:46

## 2019-11-06 RX ADMIN — Medication 1 DROP(S): at 05:46

## 2019-11-06 RX ADMIN — BRIMONIDINE TARTRATE 1 DROP(S): 2 SOLUTION/ DROPS OPHTHALMIC at 14:25

## 2019-11-06 RX ADMIN — Medication 100 MILLIGRAM(S): at 05:50

## 2019-11-06 RX ADMIN — Medication 50 MILLILITER(S): at 12:20

## 2019-11-06 RX ADMIN — Medication 325 MILLIGRAM(S): at 08:59

## 2019-11-06 RX ADMIN — Medication 25 MILLIGRAM(S): at 05:45

## 2019-11-06 RX ADMIN — Medication 25 MICROGRAM(S): at 05:46

## 2019-11-06 RX ADMIN — Medication 100 MILLIGRAM(S): at 22:37

## 2019-11-06 RX ADMIN — SODIUM CHLORIDE 60 MILLILITER(S): 9 INJECTION, SOLUTION INTRAVENOUS at 13:15

## 2019-11-06 RX ADMIN — Medication 650 MILLIGRAM(S): at 06:45

## 2019-11-06 RX ADMIN — LATANOPROST 1 DROP(S): 0.05 SOLUTION/ DROPS OPHTHALMIC; TOPICAL at 22:37

## 2019-11-06 RX ADMIN — Medication 1 DROP(S): at 17:57

## 2019-11-06 RX ADMIN — SENNA PLUS 2 TABLET(S): 8.6 TABLET ORAL at 22:37

## 2019-11-06 RX ADMIN — Medication 325 MILLIGRAM(S): at 17:57

## 2019-11-06 RX ADMIN — BRIMONIDINE TARTRATE 1 DROP(S): 2 SOLUTION/ DROPS OPHTHALMIC at 22:37

## 2019-11-06 RX ADMIN — Medication 15 GRAM(S): at 07:45

## 2019-11-06 RX ADMIN — BRIMONIDINE TARTRATE 1 DROP(S): 2 SOLUTION/ DROPS OPHTHALMIC at 05:46

## 2019-11-06 NOTE — PROGRESS NOTE ADULT - SUBJECTIVE AND OBJECTIVE BOX
HOSPITALIST PROGRESS NOTE    LUIS MONET  [id number]  89yFemale    Patient is a 89y old  Female who presents with a chief complaint of Fall (2019 13:49)      SUBJECTIVE:   Chart reviewed since last visit.  Patient seen and examined at bedside for fall, orbital fracture, Hyperkalemia, Renal failure HTN  Denies any headache, dizziness, chest pain, palpitations.  temp was 95 this am. Pt was placed on a warming blanket. NO cough, diarrhea, URI, dysuria noted by pt. FS in 50's. Pt was asymptomatic.       OBJECTIVE:  Vital Signs Last 24 Hrs  T(C): 36.4 (2019 09:55), Max: 37 (2019 15:52)  T(F): 97.6 (2019 09:55), Max: 98.6 (2019 15:52)  HR: 65 (2019 09:55) (60 - 73)  BP: 153/64 (2019 09:55) (86/40 - 186/62)   RR: 17 (2019 09:55) (16 - 17)  SpO2: 97% (2019 09:55) (94% - 97%)    PHYSICAL EXAMINATION  General: Lying in bed, NAD  HEENT:  Significant Right periorbital ecchymoses, EOMI  NECK:  Ecchymosis suprasternal region  CVS: regular rate and rhythm S1 S2 4/6 ESM  RESP:  Fair air entry  GI:  Soft nondistended NT BS+  : No suprapubic tenderness  MSK:  FROM, non edema  CNS:  Awake, oriented to person. Follows commands  INTEG:  Multiple ecchymosis  PSYCH:  Fair mood    CAPILLARY BLOOD GLUCOSE      POCT Blood Glucose.: 180 mg/dL (2019 12:01)  POCT Blood Glucose.: 146 mg/dL (2019 08:13)  POCT Blood Glucose.: 56 mg/dL (2019 07:34)        I&O's Summary    2019 07:01  -  2019 07:00  --------------------------------------------------------  IN: 240 mL / OUT: 200 mL / NET: 40 mL    2019 07:01  -  2019 14:41  --------------------------------------------------------  IN: 120 mL / OUT: 800 mL / NET: -680 mL                            10.8   4.38  )-----------( 108      ( 2019 06:17 )             33.6       11    144  |  112<H>  |  58.0<H>  ----------------------------<  141<H>  4.9   |  20.0<L>  |  2.43<H>    Ca    9.0      2019 10:22  Phos  4.6     11-  Mg     2.6         TPro  6.7  /  Alb  3.7  /  TBili  0.4  /  DBili  x   /  AST  21  /  ALT  18  /  AlkPhos  95  11-05            Culture:    TTE:    RADIOLOGY  < from: CT Chest No Cont (19 @ 19:10) >     EXAM:  CT CHEST                          PROCEDURE DATE:  2019          INTERPRETATION:  CLINICAL INFORMATION: Shortness of breath. Status post   fall.    COMPARISON: CT chest 2019    PROCEDURE:   CT of the Chest was performed without intravenous contrast.  Sagittal and coronal reformats were performed.      FINDINGS:    CHEST:     LUNGS AND LARGE AIRWAYS: Debris in the trachea and mainstem bronchi   extending into right middle and lower lobar through subsegmental   branches. Biapical scarring. Scattered calcified granulomas. Scattered   lower lobe tree-in-bud nodular opacities likely mucoid impacted airways..  PLEURA: Trace right pleural effusion. No pneumothorax.  VESSELS: Within normal limits.  HEART: Heart size is normal.No pericardial effusion.  MEDIASTINUM AND MARIELOS: No lymphadenopathy.  CHEST WALL AND LOWER NECK: Within normal limits.  VISUALIZED UPPER ABDOMEN: Fundal gastric diverticulum.  BONES: Stable degenerative changes of the spine and stable compression   deformities of T9 and T10. No acute fracture.    IMPRESSION: Scattered areas of distal mucoid impacted airways.    Trace right pleural effusion.                      JOHNATHON HUTCHINSON M.D., ATTENDING RADIOLOGIST  This document has been electronically signed. 2019  7:23PM        < end of copied text >    < from: TTE Echo Complete w/Doppler (19 @ 12:33) >  EXAM:  ECHO TRANSTHORACIC COMP W DOPP      PROCEDURE DATE:  2019   .      INTERPRETATION:  REPORT:    TRANSTHORACIC ECHOCARDIOGRAM REPORT         Patient Name:   LUIS MONET Patient Location: [facility] Rec #:  [id number]           Accession #:      [id number]  Account #:                             Height:           63.8 in 162.0 cm  YOB: 1922              Weight:           130.1 lb 59.00 kg  Patient Age:    89 years               BSA:              1.63 m²  Patient Gender: F                      BP:               130/67 mmHg       Date of Exam:        2019 12:33:36 PM  Sonographer:         Edith Mccallum  Referring Physician: Loreto Mooney MD    Procedure:     2D Echo/Doppler/Color Doppler Complete.  Indications:   Dyspnea, unspecified - R06.00  Diagnosis:     Dyspnea, unspecified - R06.00  Study Details: Technically adequate study.         2D AND M-MODE MEASUREMENTS (normal ranges within parentheses):  Left Ventricle: Normal   Aorta/Left Atrium:              Normal  IVSd (2D):              0.90 cm (0.7-1.1) LA Volume Index    34.1 ml/m²  LVPWd (2D):             1.07 cm (0.7-1.1) Right Ventricle:  LVIDd (2D):             3.64 cm (3.4-5.7) TAPSE:           1.98 cm  LVIDs (2D):             2.17 cm  LV FS (2D):             40.4 %   (>25%)  Relative Wall Thickness  0.59    (<0.42)    LV SYSTOLIC FUNCTION BY 2D PLANIMETRY (MOD):  EF-A4C View: 76.9 % EF-A2C View: 64.7 % EF-Biplane: 71 %    LV DIASTOLIC FUNCTION:  MVPeak E: 0.76 m/s E/e' Ratio: 11.80  MV Peak A: 1.28 m/s Decel Time: 246 msec  E/A Ratio: 0.59    SPECTRAL DOPPLER ANALYSIS (where applicable):  Mitral Valve:  MV Mean Grad: 3.0 mmHg MV P1/2 Time: 71.34 msec                         MV Area, PHT: 3.08 cm²    Aortic Valve: AoV Max Lucio: 2.50 m/s AoV Peak P.9 mmHg AoV Mean P.5 mmHg    LVOT Vmax: 1.03 m/s LVOT VTI: 0.229 m LVOT Diameter: 1.80 cm    AoV Area, Vmax: 1.05 cm² AoV Area, VTI: 1.07 cm² AoV Area, Vmn: 1.00 cm²  Ao VTI: 0.543  Tricuspid Valve and PA/RV Systolic Pressure: TR Max Velocity: 2.67 m/s RA   Pressure: 3 mmHg RVSP/PASP: 31.5 mmHg       PHYSICIAN INTERPRETATION:  Left Ventricle: The left ventricular internal cavity size is normal.  Global LV systolic function was hyperdynamic. Left ventricular ejection   fraction, by visual estimation, is 70 to 75%. Spectral Doppler shows   impaired relaxation pattern of left ventricular myocardial filling (Grade   I diastolic dysfunction).  Right Ventricle: Normal right ventricularsize and function.  Left Atrium: Mildly enlarged left atrium.  Right Atrium: The right atrium is normal in size. Normal right atrial   size.  Pericardium: There is no evidence of pericardial effusion. There is a   small pleural effusion in both left and right lateral regions.  Mitral Valve: There is moderate mitral annular calcification. Peak   transmitral mean gradient equals 3.0 mmHg, calculated mitral valve area   by pressure half time equals 3.08 cm² consistent with No evidence of   mitral stenosis. Mitral valve mean gradient is 3.0 mmHg consistent with   mild mitral stenosis. Trace mitral valve regurgitation is seen.  Tricuspid Valve: The tricuspid valve is normal in structure. Mild   tricuspid regurgitation is visualized.  Aortic Valve:The aortic valve is trileaflet. Mild aortic stenosis is   present. No evidence of aortic valve regurgitation is seen.  Pulmonic Valve: The pulmonic valve was not well visualized. No indication   of pulmonic valve regurgitation.  Aorta: The aortic root is normal in size and structure.  Pulmonary Artery: The pulmonary artery is not well seen.  Venous: A normal flow pattern is recorded from the right upper pulmonary   vein. The inferior vena cava was normal sized, with respiratory size   variation greater than 50%.     In comparison to the previous echocardiogram(s): Prior examinations are   available and were reviewed for comparison purposes.       Summary:   1. Technically adequate study.   2. Normal left ventricular internal cavity size.   3.Hyperdynamic global left ventricular systolic function.   4. Left ventricular ejection fraction, by visual estimation, is 70 to   75%.   5. Spectral Doppler shows impaired relaxation pattern of left   ventricular myocardial filling (Grade I diastolicdysfunction).   6. Normal right ventricular size and function.   7. Mild aortic valve stenosis.   8. Mitral valve mean gradient is 3.0 mmHg consistent with mild mitral   stenosis.   9. Moderate mitral annular calcification.  10. The right atrium is normal in size.  11. There is no evidence of pericardial effusion.    MD Mauro Electronically signed on 2019 at 3:46:28 PM       < end of copied text >      MEDICATIONS  (STANDING):  acetaminophen   Tablet .. 650 milliGRAM(s) Oral every 6 hours  brimonidine 0.2% Ophthalmic Solution 1 Drop(s) Right EYE every 8 hours  docusate sodium Oral Tab/Cap - Peds 100 milliGRAM(s) Oral three times a day  ferrous sulfate Oral Tab/Cap - Peds 325 milliGRAM(s) Oral two times a day with meals  influenza   Vaccine 0.5 milliLiter(s) IntraMuscular once  latanoprost 0.005% Ophthalmic Solution 1 Drop(s) Right EYE at bedtime  levothyroxine 25 MICROGram(s) Oral daily  lidocaine   Patch 1 Patch Transdermal daily  senna 2 Tablet(s) Oral at bedtime  timolol 0.5% Solution 1 Drop(s) Right EYE every 12 hours      MEDICATIONS  (PRN):

## 2019-11-06 NOTE — GOALS OF CARE CONVERSATION - ADVANCED CARE PLANNING - CONVERSATION DETAILS
[facility] RN met with patient and patient's daughter at patient bedside. As per daughter Morena, plan is for patient to go to [facility]. Writer left hospice informational materials with daughter and advised that daughter can call [facility] prior to patient's discharge from [facility], should she want home hospice services.     Paula Tavera RN  [phone]

## 2019-11-06 NOTE — PROGRESS NOTE ADULT - ASSESSMENT
89 year old female with PMH HTN, hypothyroidism, Glaucoma presented with facial fractures after fall - unclear circumstances of fall.       Hypothermia- rectal temp 95. Likely from hypoglycemia as pt not liking diet recommendations. Will call swallow eval in am . No signs of infection at this time. Will panculture. check TSH & am cortisol. Warming blanket applied    Hypoglycemia- D5 started. Now improved. f/u FS      Urinary retention- Bourne in place. Will call        Orbital fracture - No intervention  - Continue Tylenol    Fall, pause on monitoring  - Continue to monitor  - PT evaluation- NAN  - Cardiology note appreciated. Pt & family wishes that they and the patient would not want any invasive or aggressive intervention such as TAVR in the event her aortic stenosis is now severe. In light of this, repeat echocardiogram not warranted at this time.      Hyperkalemia, likely secondary to hemolysis from fall  - Received Lokelma with resolution    CKD 4  - IVF  - Monitor SCr, K    HTN  - No medications    Hypothyroidism  - Continue Synthroid    Anemia   - Continue Iron supplement  - monitor Hgb    Glaucoma  - Continue eye drops    Prognosis - Guarded.    Prior hospitalist discussed with grand daughter - patient and daughter do not want aggressive measure.  Awaiting palliative, Hospice evaluations

## 2019-11-06 NOTE — DIETITIAN INITIAL EVALUATION ADULT. - PERTINENT LABORATORY DATA
11-06 Na147 mmol/L<H> Glu 61 mg/dL<L> K+ 5.0 mmol/L Cr  2.40 mg/dL<H> BUN 59.0 mg/dL<H> Phos n/a   Alb 3.2 g/dL<L> PAB n/a

## 2019-11-06 NOTE — CHART NOTE - NSCHARTNOTEFT_GEN_A_CORE
Upon Nutritional Assessment by the Registered Dietitian your patient was determined to meet criteria / has evidence of the following diagnosis/diagnoses:          [ x] Severe Protein Calorie Malnutrition         Findings as based on:  •  Comprehensive nutrition assessment and consultation  •  Calorie counts (nutrient intake analysis)  •  Food acceptance and intake status from observations by staff  •  Follow up  •  Patient education  •  Intervention secondary to interdisciplinary rounds  •   concerns      Treatment:    The following diet has been recommended:  Consider formal swallow evaluation to determine need for altered consistency (pt currently receiving puree with nectar and was consuming regular consistency prior to admission)   RX: Ensure TID  RX: MVI daily    PROVIDER Section:     By signing this assessment you are acknowledging and agree with the diagnosis/diagnoses assigned by the Registered Dietitian    Comments:

## 2019-11-06 NOTE — DIETITIAN INITIAL EVALUATION ADULT. - MALNUTRITION
NFPE performed- severe muscle wasting at temple, clavicle, shoulder, interosseous.  Severe fat loss in orbital region, buccal pads. severe (chronic)

## 2019-11-06 NOTE — DIETITIAN INITIAL EVALUATION ADULT. - OTHER INFO
Pt with h/o HTN, hypothyroidism, Glaucoma presented with facial fractures after fall - unclear circumstances of fall. Spoke with pt and family.  Pt reports decreased po intake at meals- dislikes puree with nectar consistency.  Pt states consuming 3 meals daily at home, but small portions and regular consistency.  Pt/family unsure why pt receiving puree with nectar at this time.  Pt slightly poor historian- reports significant wt loss over time, but unsure how much and time frame unclear.  Pt states current wt is ~85#, which is consistent with previous admission wt in June 2019.  Current admission wt is 110# and current bedscale wt is 92#-- question accuracy of all wts.   Discussed importance of consuming adequate protein at meals- encourage Ensure supplement, puddings and yogurt as tolerated.

## 2019-11-06 NOTE — DIETITIAN INITIAL EVALUATION ADULT. - ETIOLOGY
related to inability to consume sufficient protein energy intake with persistent poor appetite in setting of advanced age, and now on puree diet

## 2019-11-07 LAB
ANION GAP SERPL CALC-SCNC: 12 MMOL/L — SIGNIFICANT CHANGE UP (ref 5–17)
BASOPHILS # BLD AUTO: 0.02 K/UL — SIGNIFICANT CHANGE UP (ref 0–0.2)
BASOPHILS NFR BLD AUTO: 0.4 % — SIGNIFICANT CHANGE UP (ref 0–2)
BUN SERPL-MCNC: 50 MG/DL — HIGH (ref 8–20)
CALCIUM SERPL-MCNC: 8.9 MG/DL — SIGNIFICANT CHANGE UP (ref 8.6–10.2)
CHLORIDE SERPL-SCNC: 108 MMOL/L — HIGH (ref 98–107)
CO2 SERPL-SCNC: 22 MMOL/L — SIGNIFICANT CHANGE UP (ref 22–29)
CORTIS AM PEAK SERPL-MCNC: 19.3 UG/DL — HIGH (ref 6–18.4)
CREAT SERPL-MCNC: 2.13 MG/DL — HIGH (ref 0.5–1.3)
EOSINOPHIL # BLD AUTO: 0.08 K/UL — SIGNIFICANT CHANGE UP (ref 0–0.5)
EOSINOPHIL NFR BLD AUTO: 1.6 % — SIGNIFICANT CHANGE UP (ref 0–6)
GLUCOSE BLDC GLUCOMTR-MCNC: 104 MG/DL — HIGH (ref 70–99)
GLUCOSE BLDC GLUCOMTR-MCNC: 116 MG/DL — HIGH (ref 70–99)
GLUCOSE BLDC GLUCOMTR-MCNC: 118 MG/DL — HIGH (ref 70–99)
GLUCOSE BLDC GLUCOMTR-MCNC: 75 MG/DL — SIGNIFICANT CHANGE UP (ref 70–99)
GLUCOSE BLDC GLUCOMTR-MCNC: 91 MG/DL — SIGNIFICANT CHANGE UP (ref 70–99)
GLUCOSE SERPL-MCNC: 76 MG/DL — SIGNIFICANT CHANGE UP (ref 70–115)
HCT VFR BLD CALC: 31.4 % — LOW (ref 34.5–45)
HGB BLD-MCNC: 9.9 G/DL — LOW (ref 11.5–15.5)
IMM GRANULOCYTES NFR BLD AUTO: 0.2 % — SIGNIFICANT CHANGE UP (ref 0–1.5)
LYMPHOCYTES # BLD AUTO: 0.99 K/UL — LOW (ref 1–3.3)
LYMPHOCYTES # BLD AUTO: 20.2 % — SIGNIFICANT CHANGE UP (ref 13–44)
MAGNESIUM SERPL-MCNC: 2.3 MG/DL — SIGNIFICANT CHANGE UP (ref 1.6–2.6)
MCHC RBC-ENTMCNC: 31.4 PG — SIGNIFICANT CHANGE UP (ref 27–34)
MCHC RBC-ENTMCNC: 31.5 GM/DL — LOW (ref 32–36)
MCV RBC AUTO: 99.7 FL — SIGNIFICANT CHANGE UP (ref 80–100)
MONOCYTES # BLD AUTO: 0.84 K/UL — SIGNIFICANT CHANGE UP (ref 0–0.9)
MONOCYTES NFR BLD AUTO: 17.1 % — HIGH (ref 2–14)
NEUTROPHILS # BLD AUTO: 2.97 K/UL — SIGNIFICANT CHANGE UP (ref 1.8–7.4)
NEUTROPHILS NFR BLD AUTO: 60.5 % — SIGNIFICANT CHANGE UP (ref 43–77)
PHOSPHATE SERPL-MCNC: 3.4 MG/DL — SIGNIFICANT CHANGE UP (ref 2.4–4.7)
PLATELET # BLD AUTO: 101 K/UL — LOW (ref 150–400)
POTASSIUM SERPL-MCNC: 4.6 MMOL/L — SIGNIFICANT CHANGE UP (ref 3.5–5.3)
POTASSIUM SERPL-SCNC: 4.6 MMOL/L — SIGNIFICANT CHANGE UP (ref 3.5–5.3)
RBC # BLD: 3.15 M/UL — LOW (ref 3.8–5.2)
RBC # FLD: 17.2 % — HIGH (ref 10.3–14.5)
SODIUM SERPL-SCNC: 142 MMOL/L — SIGNIFICANT CHANGE UP (ref 135–145)
WBC # BLD: 4.91 K/UL — SIGNIFICANT CHANGE UP (ref 3.8–10.5)
WBC # FLD AUTO: 4.91 K/UL — SIGNIFICANT CHANGE UP (ref 3.8–10.5)

## 2019-11-07 PROCEDURE — 99232 SBSQ HOSP IP/OBS MODERATE 35: CPT

## 2019-11-07 PROCEDURE — 99223 1ST HOSP IP/OBS HIGH 75: CPT

## 2019-11-07 PROCEDURE — 99233 SBSQ HOSP IP/OBS HIGH 50: CPT

## 2019-11-07 RX ORDER — POLYETHYLENE GLYCOL 3350 17 G/17G
17 POWDER, FOR SOLUTION ORAL DAILY
Refills: 0 | Status: DISCONTINUED | OUTPATIENT
Start: 2019-11-07 | End: 2019-11-08

## 2019-11-07 RX ORDER — HYDRALAZINE HCL 50 MG
10 TABLET ORAL EVERY 6 HOURS
Refills: 0 | Status: DISCONTINUED | OUTPATIENT
Start: 2019-11-07 | End: 2019-11-08

## 2019-11-07 RX ORDER — HYDRALAZINE HCL 50 MG
25 TABLET ORAL
Refills: 0 | Status: DISCONTINUED | OUTPATIENT
Start: 2019-11-07 | End: 2019-11-08

## 2019-11-07 RX ORDER — CIPROFLOXACIN LACTATE 400MG/40ML
200 VIAL (ML) INTRAVENOUS EVERY 24 HOURS
Refills: 0 | Status: DISCONTINUED | OUTPATIENT
Start: 2019-11-07 | End: 2019-11-07

## 2019-11-07 RX ORDER — MEROPENEM 1 G/30ML
500 INJECTION INTRAVENOUS EVERY 12 HOURS
Refills: 0 | Status: DISCONTINUED | OUTPATIENT
Start: 2019-11-07 | End: 2019-11-08

## 2019-11-07 RX ORDER — MEROPENEM 1 G/30ML
1000 INJECTION INTRAVENOUS EVERY 12 HOURS
Refills: 0 | Status: DISCONTINUED | OUTPATIENT
Start: 2019-11-07 | End: 2019-11-07

## 2019-11-07 RX ADMIN — BRIMONIDINE TARTRATE 1 DROP(S): 2 SOLUTION/ DROPS OPHTHALMIC at 06:48

## 2019-11-07 RX ADMIN — Medication 1 DROP(S): at 06:48

## 2019-11-07 RX ADMIN — Medication 325 MILLIGRAM(S): at 17:22

## 2019-11-07 RX ADMIN — SENNA PLUS 2 TABLET(S): 8.6 TABLET ORAL at 21:30

## 2019-11-07 RX ADMIN — Medication 325 MILLIGRAM(S): at 10:53

## 2019-11-07 RX ADMIN — SODIUM CHLORIDE 60 MILLILITER(S): 9 INJECTION, SOLUTION INTRAVENOUS at 21:30

## 2019-11-07 RX ADMIN — Medication 1 DROP(S): at 17:22

## 2019-11-07 RX ADMIN — Medication 25 MICROGRAM(S): at 06:48

## 2019-11-07 RX ADMIN — Medication 650 MILLIGRAM(S): at 07:45

## 2019-11-07 RX ADMIN — Medication 25 MILLIGRAM(S): at 17:22

## 2019-11-07 RX ADMIN — Medication 650 MILLIGRAM(S): at 18:15

## 2019-11-07 RX ADMIN — LATANOPROST 1 DROP(S): 0.05 SOLUTION/ DROPS OPHTHALMIC; TOPICAL at 21:18

## 2019-11-07 RX ADMIN — Medication 650 MILLIGRAM(S): at 06:48

## 2019-11-07 RX ADMIN — Medication 100 MILLIGRAM(S): at 21:19

## 2019-11-07 RX ADMIN — BRIMONIDINE TARTRATE 1 DROP(S): 2 SOLUTION/ DROPS OPHTHALMIC at 15:04

## 2019-11-07 RX ADMIN — Medication 100 MILLIGRAM(S): at 15:06

## 2019-11-07 RX ADMIN — BRIMONIDINE TARTRATE 1 DROP(S): 2 SOLUTION/ DROPS OPHTHALMIC at 21:20

## 2019-11-07 RX ADMIN — Medication 100 MILLIGRAM(S): at 10:53

## 2019-11-07 RX ADMIN — Medication 650 MILLIGRAM(S): at 17:21

## 2019-11-07 NOTE — PROGRESS NOTE ADULT - ASSESSMENT
89 year old female with PMH HTN, hypothyroidism, Glaucoma presented with facial fractures after fall - unclear circumstances of fall.       Hypothermia-resolved. Likely from hypoglycemia vs from infection. Swallow eval in am (pt does not like swallow recommendations).    mildly elevated TSH, not likely the cause of hypothermia. f/u AM cortisol.      Sinus nicole- likely when alseep, monitor on tele for now. Cardio signed off. Daughter not wanting aggressive measures       UTI- started IV Cipro (renally dosed) as pt is allergic to PNC (anaphylaxis) & sulfa. Bourne placed at [facility] for retention. Will try TOV.  called    Urinary retention- Bourne placed at [facility] for retention. Will try TOV.  called. LAst BM unknown by nursing. Will add bowel regimen    Hypoglycemia- D5 started. Now improved. f/u FS      Orbital fracture - No intervention  - Continue Tylenol    Fall, pause on monitoring  - Continue to monitor  - PT evaluation- [facility]  - Cardiology note appreciated. Pt & family wishes that they and the patient would not want any invasive or aggressive intervention such as TAVR in the event her aortic stenosis is now severe. In light of this, repeat echocardiogram not warranted at this time.      Hyperkalemia, likely secondary to hemolysis from fall  - Received Lokelma with resolution    CKD 4  - IVF  - Monitor SCr, K    HTN  - No medications    Hypothyroidism  - Continue Synthroid  Mildly elevated TSH, repeat as outpatient    Anemia   - Continue Iron supplement  - monitor Hgb    Glaucoma  - Continue eye drops    Prognosis - Guarded.    Prior hospitalist discussed with grand daughter - patient and daughter do not want aggressive measure. Pt is DNR/DNI  [facility] RN met with patient and patient's daughter at patient bedside. As per daughter Morena, plan is for patient to go to [facility]. 89 year old female with PMH HTN, hypothyroidism, Glaucoma presented with facial fractures after fall - unclear circumstances of fall.       Hypothermia-resolved. Likely from hypoglycemia vs from infection. Swallow eval in am (pt does not like swallow recommendations).    mildly elevated TSH, not likely the cause of hypothermia. f/u AM cortisol.      Sinus nicole- likely when alseep, monitor on tele for now. Cardio signed off. Daughter not wanting aggressive measures       UTI- started IV Cipro (renally dosed) as pt is allergic to PNC (anaphylaxis) & sulfa. f/u Ucx, Blood cx. Bourne placed at [facility] for retention. Will try TOV.  called    Urinary retention- Bourne placed at [facility] for retention. Will try TOV.  called. LAst BM unknown by nursing. Will add bowel regimen    Hypoglycemia- D5 started. Now improved. f/u FS      Orbital fracture - No intervention  - Continue Tylenol    Fall, pause on monitoring  - Continue to monitor  - PT evaluation- [facility]  - Cardiology note appreciated. Pt & family wishes that they and the patient would not want any invasive or aggressive intervention such as TAVR in the event her aortic stenosis is now severe. In light of this, repeat echocardiogram not warranted at this time.      Hyperkalemia, likely secondary to hemolysis from fall  - Received Lokelma with resolution    CKD 4  - IVF  - Monitor SCr, K    HTN  - No medications    Hypothyroidism  - Continue Synthroid  Mildly elevated TSH, repeat as outpatient    Anemia   - Continue Iron supplement  - monitor Hgb    Glaucoma  - Continue eye drops    Prognosis - Guarded.    Prior hospitalist discussed with grand daughter - patient and daughter do not want aggressive measure. Pt is DNR/DNI  [facility] RN met with patient and patient's daughter at patient bedside. As per daughter Morena, plan is for patient to go to [facility].

## 2019-11-07 NOTE — CONSULT NOTE ADULT - SUBJECTIVE AND OBJECTIVE BOX
INFECTIOUS DISEASES AND INTERNAL MEDICINE at [place]  =======================================================  Farrukh Steen MD  Diplomates American Board of Internal Medicine and Infectious Diseases  Telephone [phone]  Fax            [phone]  =======================================================    LUIS [id number]      HPI:  89 year old female with PMH significant for chronic CHF, Glaucoma, HTN and hypothyroidism presented [facility] ED after sustaining a ground level fall earlier today. Patient states that she was getting up from her bed when she felt weak, loss her balance and fell. Patient could not provide further details regarding the incident. Patient taking ASA-81mg daily. Upon further workup, CT head and maxillofacial revealed comminuted fracture of right lateral orbital wall, hemorrhage within retrobulbar lateral extraconal space of right orbit as well as w nondisplaced fracture of right lateral maxillary wall.  PT DEVELOPED HYPOTHERMIA WITH CONCERN FOR INFECTIOUS PROCESS  ASKED TO EVALUATE FROM ID STANDPOINT       PAST MEDICAL & SURGICAL HISTORY:  Chronic CHF  Glaucoma  HTN (hypertension)  Breast cancer: s/p RT lumpectomy and radiation  Hypothyroid  S/P cataract extraction: bilateral  S/P lumpectomy, right breast      ANTIBIOTICS  ciprofloxacin   IVPB 200 milliGRAM(s) IV Intermittent every 24 hours      Allergies    codeine (Vomiting)  penicillin (Rash; Anaphylaxis)  Sulfacetamide Sodium (Rash)    Intolerances        SOCIAL HISTORY:     FAMILY HX   FAMILY HISTORY:  Family history of brain cancer: Brother  Family history of uterine cancer: Sister - cervical/uterine  Family hx of lung cancer: 2 Brothers      Vital Signs Last 24 Hrs  T(C): 34.8 (2019 16:00), Max: 36.4 (2019 22:36)  T(F): 94.7 (2019 16:00), Max: 97.6 (2019 22:36)  HR: 50 (2019 14:58) (50 - 57)  BP: 144/53 (2019 14:58) (114/51 - 169/61)  BP(mean): --  RR: 16 (2019 14:58) (16 - 18)  SpO2: 96% (2019 14:58) (95% - 98%)  Drug Dosing Weight  Height (cm): 157.48 (2019 15:34)  Weight (kg): 49.9 (2019 15:34)  BMI (kg/m2): 20.1 (2019 15:34)  BSA (m2): 1.48 (2019 15:34)      REVIEW OF SYSTEMS:    CONSTITUTIONAL:  As per HPI.    HEENT:  Eyes:  No diplopia or blurred vision. ENT:  No earache, sore throat or runny nose.    CARDIOVASCULAR:  No pressure, squeezing, strangling, tightness, heaviness or aching about the chest, neck, axilla or epigastrium.    RESPIRATORY:  No cough, shortness of breath, PND or orthopnea.    GASTROINTESTINAL:  No nausea, vomiting or diarrhea.    GENITOURINARY:  No dysuria, frequency or urgency.    MUSCULOSKELETAL:  As per HPI.    SKIN:  No change in skin, hair or nails.    NEUROLOGIC:  No paresthesias, fasciculations, seizures or weakness.                  PHYSICAL EXAMINATION:    GENERAL: The patient is a well-developed, well-nourished _____in no apparent distress. ___ is alert and oriented x3.    VITAL SIGNS: T(C): 34.8 (19 @ 16:00), Max: 36.4 (19 @ 22:36)  HR: 50 (19 @ 14:58) (50 - 57)  BP: 144/53 (19 @ 14:58) (114/51 - 169/61)  RR: 16 (19 @ 14:58) (16 - 18)  SpO2: 96% (19 @ 14:58) (95% - 98%)  Wt(kg): --    HEENT: Head is normocephalic and atraumatic.  ANICTERIC  NECK: Supple. No carotid bruits.  No lymphadenopathy or thyromegaly.    LUNGS:COARSE BREATH SOUNDS    HEART: Regular rate and rhythm without murmur.    ABDOMEN: Soft, nontender, and nondistended.  Positive bowel sounds.  No hepatosplenomegaly was noted. NO REBOUND NO GUARDING    EXTREMITIES: NO EDEMA NO ERYTHEMA    NEUROLOGIC: NON FOCAL      SKIN: No ulceration or induration present. NO RASH        BLOOD CULTURES  Culture Results:   >100,000 CFU/ml Gram Negative Rods Identification and susceptibility to  follow.  Culture in progress ( @ 15:04)       URINE CX          LABS:                        9.9    4.91  )-----------( 101      ( 2019 06:36 )             31.4         142  |  108<H>  |  50.0<H>  ----------------------------<  76  4.6   |  22.0  |  2.13<H>    Ca    8.9      2019 06:36  Phos  3.4       Mg     2.3         TPro  6.3<L>  /  Alb  3.2<L>  /  TBili  0.4  /  DBili  x   /  AST  41<H>  /  ALT  26  /  AlkPhos  92        Urinalysis Basic - ( 2019 15:04 )    Color: Yellow / Appearance: Clear / S.015 / pH: x  Gluc: x / Ketone: Negative  / Bili: Negative / Urobili: Negative   Blood: x / Protein: 100 / Nitrite: Positive   Leuk Esterase: Moderate / RBC: 3-5 /HPF / WBC 26-50   Sq Epi: x / Non Sq Epi: Occasional / Bacteria: Moderate        RADIOLOGY & ADDITIONAL STUDIES:      ASSESSMENT/PLAN  89 year old female with PMH significant for chronic CHF, Glaucoma, HTN and hypothyroidism presented [facility] ED after sustaining a ground level fall earlier today. Patient states that she was getting up from her bed when she felt weak, loss her balance and fell. Patient could not provide further details regarding the incident. Patient taking ASA-81mg daily. Upon further workup, CT head and maxillofacial revealed comminuted fracture of right lateral orbital wall, hemorrhage within retrobulbar lateral extraconal space of right orbit as well as w nondisplaced fracture of right lateral maxillary wall.  PT DEVELOPED HYPOTHERMIA WITH CONCERN FOR INFECTIOUS PROCESS  CURRENTLY  AWAKE LETHARGIC ON WARMING BLANKET  URINE CX GM NEG RODS 100K  BLOOD CX ARE PENDING  WILL RX  MERREM TO COVER BROADLY UNTIL CX ARE BACK   PT WAS HOSP A FEW MONTHS BACK WILL COVER FOR POTENTIAL MDRO  PT WITH PCN ALLERGY BUT WAS MANY YEARS AGO SHOULD TOLERATE CARBAPENEMS  WOULD LIKE TO AVOID FLUOROQUINOLONES IN THIS AGE GROUP                     ALEIDA ARIZA MD

## 2019-11-07 NOTE — PROGRESS NOTE ADULT - SUBJECTIVE AND OBJECTIVE BOX
This is a Palliative Consult - This is a 89 year old female PMHx CHF, HTN, breast CA (post lumpectomy/Radiation) admitted to [facility] s/p fall. Patient sustained maxillofacial fracture of orbital wall, hemorrhage within right retrobulbar lateral extraconal space orbit and a non displaced fracture of right lateral maxillary wall. Also found to have increased IOP - opthalmology consulted recommending  lateral canthotomy and several eye drops to aid in reducing IOP  Hospitalist met with daughter previously to our encounter in which she explained she doesn't want any aggressive measures at this time     <HPI:  89 year old female with PMH significant for chronic CHF, Glaucoma, HTN and hypothyroidism presented [facility] ED after sustaining a ground level fall earlier today. Patient states that she was getting up from her bed when she felt weak, loss her balance and fell. Patient could not provide further details regarding the incident. Patient taking ASA-81mg daily. Upon further workup, CT head and maxillofacial revealed comminuted fracture of right lateral orbital wall, hemorrhage within retrobulbar lateral extraconal space of right orbit as well as w nondisplaced fracture of right lateral maxillary wall.   While in the ED it was noted that the patient had increased IOP, Ophthalmology recommended lateral canthotomy and several eye drops to aid in reducing IOP. Patient with no major complaints other than right periorbital pain, mild headache but denies nausea nor emesis. (2019 22:31)> end of copied text    Present Symptoms:   Dyspnea: 0 rest, 1-2 exertion  Nausea/Vomiting: No  Anxiety:  No  Depression: No  Fatigue: Yes   Loss of appetite: Yes     Pain: Denies pain but has fractures            Character-            Duration-            Effect-            Factors-            Frequency-            Location-            Severity-    Review of Systems: Reviewed                     Positive: SOB on exertion  All others negative    MEDICATIONS  (STANDING):  acetaminophen   Tablet .. 650 milliGRAM(s) Oral every 6 hours  brimonidine 0.2% Ophthalmic Solution 1 Drop(s) Right EYE every 8 hours  ciprofloxacin   IVPB 200 milliGRAM(s) IV Intermittent every 24 hours  dextrose 5%. 1000 milliLiter(s) (60 mL/Hr) IV Continuous <Continuous>  docusate sodium Oral Tab/Cap - Peds 100 milliGRAM(s) Oral three times a day  ferrous sulfate Oral Tab/Cap - Peds 325 milliGRAM(s) Oral two times a day with meals  hydrALAZINE 25 milliGRAM(s) Oral two times a day  influenza   Vaccine 0.5 milliLiter(s) IntraMuscular once  latanoprost 0.005% Ophthalmic Solution 1 Drop(s) Right EYE at bedtime  levothyroxine 25 MICROGram(s) Oral daily  lidocaine   Patch 1 Patch Transdermal daily  senna 2 Tablet(s) Oral at bedtime  timolol 0.5% Solution 1 Drop(s) Right EYE every 12 hours    MEDICATIONS  (PRN):  hydrALAZINE Injectable 10 milliGRAM(s) IV Push every 6 hours PRN for SBP > 150      PHYSICAL EXAM:    Vital Signs Last 24 Hrs  T(C): 36.4 (2019 06:12), Max: 36.8 (2019 16:37)  T(F): 97.5 (2019 06:12), Max: 98.3 (2019 16:37)  HR: 52 (2019 09:52) (52 - 62)  BP: 114/51 (2019 09:52) (114/51 - 169/61)  BP(mean): --  RR: 17 (2019 09:52) (16 - 18)  SpO2: 96% (2019 09:52) (95% - 98%)    General: alert  oriented x _3 forgetful     HEENT: dry mouth     Lungs: comfortable at rest, dyspnea on exertion     CV: normal      GI: incontinent     : incontinent     MSK: weakness  bedbound sustained fractures    Skin: thin frail skin, ecchymosis to face and body     LABS:                          9.9    4.91  )-----------( 101      ( 2019 06:36 )             31.4     11    142  |  108<H>  |  50.0<H>  ----------------------------<  76  4.6   |  22.0  |  2.13<H>    Ca    8.9      2019 06:36  Phos  3.4       Mg     2.3         TPro  6.3<L>  /  Alb  3.2<L>  /  TBili  0.4  /  DBili  x   /  AST  41<H>  /  ALT  26  /  AlkPhos  92  11-06      Urinalysis Basic - ( 2019 15:04 )    Color: Yellow / Appearance: Clear / S.015 / pH: x  Gluc: x / Ketone: Negative  / Bili: Negative / Urobili: Negative   Blood: x / Protein: 100 / Nitrite: Positive   Leuk Esterase: Moderate / RBC: 3-5 /HPF / WBC 26-50   Sq Epi: x / Non Sq Epi: Occasional / Bacteria: Moderate      I&O's Summary    2019 07:  -  2019 07:00  --------------------------------------------------------  IN: 660 mL / OUT: 750 mL / NET: -90 mL    2019 07:  -  2019 13:50  --------------------------------------------------------  IN: 120 mL / OUT: 250 mL / NET: -130 mL        RADIOLOGY & ADDITIONAL STUDIES:  Ct chest 19  IMPRESSION: Scattered areas of distal mucoid impacted airways.  Trace right pleural effusion.    ADVANCE DIRECTIVES:   DNR/I MOLST established in 2019

## 2019-11-07 NOTE — PROGRESS NOTE ADULT - ASSESSMENT
This is a 89 year old female PMHx CHF, HTN, breast CA (post lumpectomy/Radiation) admitted to [facility] s/p fall. Patient sustained maxillofacial fracture of orbital wall, hemorrhage within right retrobulbar lateral extraconal space orbit and a non displaced fracture of right lateral maxillary wall. Also found to have increased IOP - opthalmology consulted recommending  lateral canthotomy and several eye drops to aid in reducing IOP

## 2019-11-07 NOTE — PROGRESS NOTE ADULT - PROBLEM SELECTOR PLAN 4
-Met with patient and granddaughter at bedside  -Patient alert and oriented, less forgetful today   -Patient defers to her daughter Morena when making health care decisions  Call placed to Morena. Discussed goals at this time.   -Goal per Morena is to discharge her mother to [facility] and then transition to home. Daughter states that at home her mother is very independent. She is able to do most ADLs independently such as bathing and laundry. Daughter states that she does live with her and is readily available when she needs extra support.   - Discussed Hospice as a community resource available for her mother. Daughter is open to the concept but at this time she doesn't want to pursue hospice. Informed daughter that if while she is at Rehab if her mother's condition declines she can always reach out to hospice from there. Patient was previously receiving [facility] but stopped in June of 2019.   - Attempted to reach out to Dr. Morrow to discuss outcome of our palliative discussion, no call back.   - Daughter is requesting to speak with Dr. Morrow and Cardiology at this time  - At this time will continue to support and monitor    Total time spent 30 minutes    Thank you for the opportunity to assist with the care of this patient.   [facility] [phone]. -Met with patient and granddaughter at bedside  -Patient alert and oriented, less forgetful today   -Patient defers to her daughter Morena when making health care decisions  Call placed to Morena. Discussed goals at this time.   -Goal per Morena is to discharge her mother to [facility] and then transition to home. Daughter states that at home her mother is very independent. She is able to do most ADLs independently such as bathing and laundry. Daughter states that she does live with her and is readily available when she needs extra support.   - Discussed Hospice as a community resource available for her mother. Daughter is open to the concept but at this time she doesn't want to pursue hospice. Informed daughter that if while she is at Rehab if her mother's condition declines she can always reach out to hospice from there. Patient was previously receiving [facility] but stopped in June of 2019.   - Daughter is requesting to speak with Dr. Morrow and Cardiology at this time  - At this time will continue to support and monitor    Total time spent 30 minutes    Thank you for the opportunity to assist with the care of this patient.   [facility] [phone].

## 2019-11-07 NOTE — PROGRESS NOTE ADULT - SUBJECTIVE AND OBJECTIVE BOX
CHIEF COMPLAINT/INTERVAL HISTORY:    Patient is a 89y old  Female who presents with a chief complaint of fall (2019 13:49)      HPI:  89 year old female with PMH significant for chronic CHF, Glaucoma, HTN and hypothyroidism presented [facility] ED after sustaining a ground level fall earlier today. Patient states that she was getting up from her bed when she felt weak, loss her balance and fell. Patient could not provide further details regarding the incident. Patient taking ASA-81mg daily. Upon further workup, CT head and maxillofacial revealed comminuted fracture of right lateral orbital wall, hemorrhage within retrobulbar lateral extraconal space of right orbit as well as w nondisplaced fracture of right lateral maxillary wall.     While in the ED it was noted that the patient had increased IOP, Ophthalmology recommended lateral canthotomy and several eye drops to aid in reducing IOP. Patient with no major complaints other than right periorbital pain, mild headache but denies nausea nor emesis. (2019 22:31)      SUBJECTIVE & OBJECTIVE/ ROS: Pt seen and examined at bedside. No further hypothermia/ hypoglycemia.  NO cough, diarrhea, URI, dysuria noted by pt.   On tele pt in SB 30-40's when sleeping, when awake in 50-60's. Asymptomatic      ICU Vital Signs Last 24 Hrs  T(C): 34.6 (2019 14:32), Max: 36.8 (2019 16:37)  T(F): 94.2 (2019 14:32), Max: 98.3 (2019 16:37)  HR: 51 (2019 14:32) (51 - 62)  BP: 133/50 (2019 14:32) (114/51 - 169/61)  BP(mean): --  ABP: --  ABP(mean): --  RR: 16 (2019 14:32) (16 - 18)  SpO2: 98% (2019 14:32) (95% - 98%)        MEDICATIONS  (STANDING):  acetaminophen   Tablet .. 650 milliGRAM(s) Oral every 6 hours  brimonidine 0.2% Ophthalmic Solution 1 Drop(s) Right EYE every 8 hours  ciprofloxacin   IVPB 200 milliGRAM(s) IV Intermittent every 24 hours  dextrose 5%. 1000 milliLiter(s) (60 mL/Hr) IV Continuous <Continuous>  docusate sodium Oral Tab/Cap - Peds 100 milliGRAM(s) Oral three times a day  ferrous sulfate Oral Tab/Cap - Peds 325 milliGRAM(s) Oral two times a day with meals  hydrALAZINE 25 milliGRAM(s) Oral two times a day  influenza   Vaccine 0.5 milliLiter(s) IntraMuscular once  latanoprost 0.005% Ophthalmic Solution 1 Drop(s) Right EYE at bedtime  levothyroxine 25 MICROGram(s) Oral daily  lidocaine   Patch 1 Patch Transdermal daily  senna 2 Tablet(s) Oral at bedtime  timolol 0.5% Solution 1 Drop(s) Right EYE every 12 hours    MEDICATIONS  (PRN):  hydrALAZINE Injectable 10 milliGRAM(s) IV Push every 6 hours PRN for SBP > 150      LABS:                        9.9    4.91  )-----------( 101      ( 2019 06:36 )             31.4     11-07    142  |  108<H>  |  50.0<H>  ----------------------------<  76  4.6   |  22.0  |  2.13<H>    Ca    8.9      2019 06:36  Phos  3.4     11-07  Mg     2.3     11-07    TPro  6.3<L>  /  Alb  3.2<L>  /  TBili  0.4  /  DBili  x   /  AST  41<H>  /  ALT  26  /  AlkPhos  92  11-06      Urinalysis Basic - ( 2019 15:04 )    Color: Yellow / Appearance: Clear / S.015 / pH: x  Gluc: x / Ketone: Negative  / Bili: Negative / Urobili: Negative   Blood: x / Protein: 100 / Nitrite: Positive   Leuk Esterase: Moderate / RBC: 3-5 /HPF / WBC 26-50   Sq Epi: x / Non Sq Epi: Occasional / Bacteria: Moderate        CAPILLARY BLOOD GLUCOSE      POCT Blood Glucose.: 118 mg/dL (2019 11:50)  POCT Blood Glucose.: 75 mg/dL (2019 08:06)  POCT Blood Glucose.: 104 mg/dL (2019 06:16)  POCT Blood Glucose.: 100 mg/dL (2019 23:25)  POCT Blood Glucose.: 131 mg/dL (2019 17:51)      RECENT CULTURES:      RADIOLOGY & ADDITIONAL TESTS:      PHYSICAL EXAM:    General: Lying in bed, NAD  HEENT:  Significant Right periorbital ecchymoses, EOMI  NECK:  Ecchymosis suprasternal region  ENMT: Moist mucous membranes  NECK: Supple, No JVD  NERVOUS SYSTEM:  Awake, oriented to person. Follows commands  CHEST/LUNG: Clear to auscultation bilaterally; No rales, rhonchi, wheezing, or rubs  HEART: Regular rate and rhythm;  4/6 ESM  ABDOMEN: Soft, Nontender, Nondistended; Bowel sounds present  EXTREMITIES:  2+ Peripheral Pulses, No clubbing, cyanosis, or edema

## 2019-11-08 ENCOUNTER — TRANSCRIPTION ENCOUNTER (OUTPATIENT)
Age: 84
End: 2019-11-08

## 2019-11-08 VITALS
SYSTOLIC BLOOD PRESSURE: 162 MMHG | RESPIRATION RATE: 18 BRPM | DIASTOLIC BLOOD PRESSURE: 56 MMHG | HEART RATE: 49 BPM | OXYGEN SATURATION: 95 %

## 2019-11-08 DIAGNOSIS — R33.9 RETENTION OF URINE, UNSPECIFIED: ICD-10-CM

## 2019-11-08 DIAGNOSIS — N39.0 URINARY TRACT INFECTION, SITE NOT SPECIFIED: ICD-10-CM

## 2019-11-08 LAB
-  AMIKACIN: SIGNIFICANT CHANGE UP
-  AMPICILLIN/SULBACTAM: SIGNIFICANT CHANGE UP
-  AMPICILLIN: SIGNIFICANT CHANGE UP
-  AZTREONAM: SIGNIFICANT CHANGE UP
-  CEFAZOLIN: SIGNIFICANT CHANGE UP
-  CEFEPIME: SIGNIFICANT CHANGE UP
-  CEFOXITIN: SIGNIFICANT CHANGE UP
-  CEFTRIAXONE: SIGNIFICANT CHANGE UP
-  CIPROFLOXACIN: SIGNIFICANT CHANGE UP
-  ERTAPENEM: SIGNIFICANT CHANGE UP
-  GENTAMICIN: SIGNIFICANT CHANGE UP
-  IMIPENEM: SIGNIFICANT CHANGE UP
-  LEVOFLOXACIN: SIGNIFICANT CHANGE UP
-  MEROPENEM: SIGNIFICANT CHANGE UP
-  NITROFURANTOIN: SIGNIFICANT CHANGE UP
-  PIPERACILLIN/TAZOBACTAM: SIGNIFICANT CHANGE UP
-  TIGECYCLINE: SIGNIFICANT CHANGE UP
-  TOBRAMYCIN: SIGNIFICANT CHANGE UP
-  TRIMETHOPRIM/SULFAMETHOXAZOLE: SIGNIFICANT CHANGE UP
ANION GAP SERPL CALC-SCNC: 11 MMOL/L — SIGNIFICANT CHANGE UP (ref 5–17)
BASOPHILS # BLD AUTO: 0.02 K/UL — SIGNIFICANT CHANGE UP (ref 0–0.2)
BASOPHILS NFR BLD AUTO: 0.3 % — SIGNIFICANT CHANGE UP (ref 0–2)
BUN SERPL-MCNC: 46 MG/DL — HIGH (ref 8–20)
CALCIUM SERPL-MCNC: 8.8 MG/DL — SIGNIFICANT CHANGE UP (ref 8.6–10.2)
CHLORIDE SERPL-SCNC: 106 MMOL/L — SIGNIFICANT CHANGE UP (ref 98–107)
CO2 SERPL-SCNC: 21 MMOL/L — LOW (ref 22–29)
CREAT SERPL-MCNC: 2.27 MG/DL — HIGH (ref 0.5–1.3)
CULTURE RESULTS: SIGNIFICANT CHANGE UP
EOSINOPHIL # BLD AUTO: 0.1 K/UL — SIGNIFICANT CHANGE UP (ref 0–0.5)
EOSINOPHIL NFR BLD AUTO: 1.6 % — SIGNIFICANT CHANGE UP (ref 0–6)
GLUCOSE BLDC GLUCOMTR-MCNC: 70 MG/DL — SIGNIFICANT CHANGE UP (ref 70–99)
GLUCOSE BLDC GLUCOMTR-MCNC: 91 MG/DL — SIGNIFICANT CHANGE UP (ref 70–99)
GLUCOSE SERPL-MCNC: 80 MG/DL — SIGNIFICANT CHANGE UP (ref 70–115)
HCT VFR BLD CALC: 30.8 % — LOW (ref 34.5–45)
HGB BLD-MCNC: 9.7 G/DL — LOW (ref 11.5–15.5)
IMM GRANULOCYTES NFR BLD AUTO: 0.5 % — SIGNIFICANT CHANGE UP (ref 0–1.5)
LYMPHOCYTES # BLD AUTO: 0.95 K/UL — LOW (ref 1–3.3)
LYMPHOCYTES # BLD AUTO: 15.5 % — SIGNIFICANT CHANGE UP (ref 13–44)
MAGNESIUM SERPL-MCNC: 2.2 MG/DL — SIGNIFICANT CHANGE UP (ref 1.6–2.6)
MCHC RBC-ENTMCNC: 31.4 PG — SIGNIFICANT CHANGE UP (ref 27–34)
MCHC RBC-ENTMCNC: 31.5 GM/DL — LOW (ref 32–36)
MCV RBC AUTO: 99.7 FL — SIGNIFICANT CHANGE UP (ref 80–100)
METHOD TYPE: SIGNIFICANT CHANGE UP
MONOCYTES # BLD AUTO: 0.84 K/UL — SIGNIFICANT CHANGE UP (ref 0–0.9)
MONOCYTES NFR BLD AUTO: 13.7 % — SIGNIFICANT CHANGE UP (ref 2–14)
NEUTROPHILS # BLD AUTO: 4.2 K/UL — SIGNIFICANT CHANGE UP (ref 1.8–7.4)
NEUTROPHILS NFR BLD AUTO: 68.4 % — SIGNIFICANT CHANGE UP (ref 43–77)
ORGANISM # SPEC MICROSCOPIC CNT: SIGNIFICANT CHANGE UP
ORGANISM # SPEC MICROSCOPIC CNT: SIGNIFICANT CHANGE UP
PHOSPHATE SERPL-MCNC: 3.3 MG/DL — SIGNIFICANT CHANGE UP (ref 2.4–4.7)
PLATELET # BLD AUTO: 114 K/UL — LOW (ref 150–400)
POTASSIUM SERPL-MCNC: 4.8 MMOL/L — SIGNIFICANT CHANGE UP (ref 3.5–5.3)
POTASSIUM SERPL-SCNC: 4.8 MMOL/L — SIGNIFICANT CHANGE UP (ref 3.5–5.3)
RBC # BLD: 3.09 M/UL — LOW (ref 3.8–5.2)
RBC # FLD: 16.1 % — HIGH (ref 10.3–14.5)
SODIUM SERPL-SCNC: 138 MMOL/L — SIGNIFICANT CHANGE UP (ref 135–145)
SPECIMEN SOURCE: SIGNIFICANT CHANGE UP
WBC # BLD: 6.14 K/UL — SIGNIFICANT CHANGE UP (ref 3.8–10.5)
WBC # FLD AUTO: 6.14 K/UL — SIGNIFICANT CHANGE UP (ref 3.8–10.5)

## 2019-11-08 PROCEDURE — 99239 HOSP IP/OBS DSCHRG MGMT >30: CPT

## 2019-11-08 RX ORDER — CEFPODOXIME PROXETIL 100 MG
1 TABLET ORAL
Qty: 0 | Refills: 0 | DISCHARGE
Start: 2019-11-08

## 2019-11-08 RX ORDER — LATANOPROST 0.05 MG/ML
1 SOLUTION/ DROPS OPHTHALMIC; TOPICAL
Qty: 0 | Refills: 0 | DISCHARGE
Start: 2019-11-08

## 2019-11-08 RX ORDER — CEFPODOXIME PROXETIL 100 MG
200 TABLET ORAL EVERY 24 HOURS
Refills: 0 | Status: DISCONTINUED | OUTPATIENT
Start: 2019-11-08 | End: 2019-11-08

## 2019-11-08 RX ORDER — BRIMONIDINE TARTRATE 2 MG/MG
1 SOLUTION/ DROPS OPHTHALMIC
Qty: 0 | Refills: 0 | DISCHARGE
Start: 2019-11-08

## 2019-11-08 RX ORDER — TIMOLOL 0.5 %
1 DROPS OPHTHALMIC (EYE)
Qty: 0 | Refills: 0 | DISCHARGE
Start: 2019-11-08

## 2019-11-08 RX ORDER — CEFPODOXIME PROXETIL 100 MG
200 TABLET ORAL EVERY 12 HOURS
Refills: 0 | Status: DISCONTINUED | OUTPATIENT
Start: 2019-11-08 | End: 2019-11-08

## 2019-11-08 RX ADMIN — Medication 100 MILLIGRAM(S): at 14:39

## 2019-11-08 RX ADMIN — Medication 325 MILLIGRAM(S): at 08:46

## 2019-11-08 RX ADMIN — Medication 1 DROP(S): at 05:52

## 2019-11-08 RX ADMIN — MEROPENEM 100 MILLIGRAM(S): 1 INJECTION INTRAVENOUS at 05:54

## 2019-11-08 RX ADMIN — Medication 25 MILLIGRAM(S): at 05:52

## 2019-11-08 RX ADMIN — Medication 25 MICROGRAM(S): at 05:52

## 2019-11-08 RX ADMIN — BRIMONIDINE TARTRATE 1 DROP(S): 2 SOLUTION/ DROPS OPHTHALMIC at 05:53

## 2019-11-08 RX ADMIN — Medication 650 MILLIGRAM(S): at 11:21

## 2019-11-08 RX ADMIN — BRIMONIDINE TARTRATE 1 DROP(S): 2 SOLUTION/ DROPS OPHTHALMIC at 14:39

## 2019-11-08 RX ADMIN — Medication 200 MILLIGRAM(S): at 17:16

## 2019-11-08 RX ADMIN — Medication 650 MILLIGRAM(S): at 05:52

## 2019-11-08 RX ADMIN — POLYETHYLENE GLYCOL 3350 17 GRAM(S): 17 POWDER, FOR SOLUTION ORAL at 11:21

## 2019-11-08 NOTE — CHART NOTE - NSCHARTNOTEFT_GEN_A_CORE
Source: Patient [ x]  Family [ ]   other [ ]    Current Diet: Diet, DASH/TLC:   Sodium & Cholesterol Restricted  Dysphagia 1, Pureed, Nectar Consistency Fluid (DYS1NC)  Supplement Feeding Modality:  Oral  Ensure Clear Cans or Servings Per Day:  1       Frequency:  Daily (11-04-19 @ 13:48)    PO intake:  pt reports poor po intake due to altered consistency diet order. pt reports drinking little of the ensure. pt consumed <25% of meal per tray observation.     Current Weight:   (11/7) 92.1#  (11/5) 97#  (11/4) 103.8#  -pt reports usual weight about 85#    % Weight Change: Weight trending down. Question accuracy of weights. Aware of weight loss prior to admission. Will continue to monitor.     Pertinent Medications: MEDICATIONS  (STANDING):  acetaminophen   Tablet .. 650 milliGRAM(s) Oral every 6 hours  brimonidine 0.2% Ophthalmic Solution 1 Drop(s) Right EYE every 8 hours  cefpodoxime 200 milliGRAM(s) Oral every 12 hours  dextrose 5%. 1000 milliLiter(s) (60 mL/Hr) IV Continuous <Continuous>  docusate sodium Oral Tab/Cap - Peds 100 milliGRAM(s) Oral three times a day  ferrous sulfate Oral Tab/Cap - Peds 325 milliGRAM(s) Oral two times a day with meals  hydrALAZINE 25 milliGRAM(s) Oral two times a day  influenza   Vaccine 0.5 milliLiter(s) IntraMuscular once  latanoprost 0.005% Ophthalmic Solution 1 Drop(s) Right EYE at bedtime  levothyroxine 25 MICROGram(s) Oral daily  lidocaine   Patch 1 Patch Transdermal daily  polyethylene glycol 3350 17 Gram(s) Oral daily  senna 2 Tablet(s) Oral at bedtime  timolol 0.5% Solution 1 Drop(s) Right EYE every 12 hours    MEDICATIONS  (PRN):  hydrALAZINE Injectable 10 milliGRAM(s) IV Push every 6 hours PRN for SBP > 150    Pertinent Labs: CBC Full  -  ( 08 Nov 2019 07:57 )  WBC Count : 6.14 K/uL  RBC Count : 3.09 M/uL  Hemoglobin : 9.7 g/dL  Hematocrit : 30.8 %  Platelet Count - Automated : 114 K/uL  Mean Cell Volume : 99.7 fl  Mean Cell Hemoglobin : 31.4 pg  Mean Cell Hemoglobin Concentration : 31.5 gm/dL  Auto Neutrophil # : 4.20 K/uL  Auto Lymphocyte # : 0.95 K/uL  Auto Monocyte # : 0.84 K/uL  Auto Eosinophil # : 0.10 K/uL  Auto Basophil # : 0.02 K/uL  Auto Neutrophil % : 68.4 %  Auto Lymphocyte % : 15.5 %  Auto Monocyte % : 13.7 %  Auto Eosinophil % : 1.6 %  Auto Basophil % : 0.3 %  11-08 Na138 mmol/L Glu 80 mg/dL K+ 4.8 mmol/L Cr  2.27 mg/dL<H> BUN 46.0 mg/dL<H> Phos 3.3 mg/dL Alb n/a   PAB n/a       Skin:  -Mild edema R periorbital     Nutrition focused physical exam previously conducted - found signs of malnutrition [ ]absent [ x]present    Subcutaneous fat loss: [x ] Orbital fat pads region, [ x]Buccal fat region, [ ]Triceps region,  [ ]Ribs region    Muscle wasting: [x ]Temples region, [x ]Clavicle region, [ x]Shoulder region, [ ]Scapula region, [x ]Interosseous region,  [ ]thigh region, [ ]Calf region    Estimated Needs:   [ x] no change since previous assessment  [ ] recalculated:     Current Nutrition Diagnosis: Pt remains at high nutrition risk and meets criteria for severe (chronic) malnutrition related to inability to consume sufficient protein energy intake with persistent poor appetite in setting of advanced age, and now on puree diet as evidenced by pt meeting <75% est energy intake > 1 mo, severe muscle wasting in temple, clavicle, shoulder, interosseous region and severe fat loss in orbital fat pad region and buccal fat region. Pt reported she is unclear why she is receiving pureed diet and extremely dislikes it. Pt reports no problems chewing or swallowing and consumes regular texture diet at home. Pt prefers chocolate Ensure.     Recommendations:   Consider formal swallow evaluation to determine need for altered consistency  RX: MVI daily  Ensure TID    Monitoring and Evaluation:   [ x] PO intake [x ] Tolerance to diet prescription [X] Weights  [X] Follow up per protocol [X] Labs:

## 2019-11-08 NOTE — CONSULT NOTE ADULT - ASSESSMENT
89 yo female s/p fall, facial fractures, urinary retention  - cont pedroza cath until OOB and ambulating  - TOV when ambulatory  - bowel regimen, prevent constipation

## 2019-11-08 NOTE — PROGRESS NOTE ADULT - SUBJECTIVE AND OBJECTIVE BOX
CC: s/p fall (2019 11:27)    HPI:  89 year old female with PMH significant for chronic CHF, Glaucoma, HTN and hypothyroidism presented [facility] ED after sustaining a ground level fall. Patient states that she was getting up from her bed when she felt weak, loss her balance and fell. Patient could not provide further details regarding the incident. Patient taking ASA-81mg daily. Upon further workup, CT head and maxillofacial revealed comminuted fracture of right lateral orbital wall, hemorrhage within retrobulbar lateral extraconal space of right orbit as well as w nondisplaced fracture of right lateral maxillary wall.     While in the ED it was noted that the patient had increased IOP, Ophthalmology recommended lateral canthotomy and several eye drops to aid in reducing IOP.     INTERVAL HPI/OVERNIGHT EVENTS: Patient seen and examined lying in bed.  Patient denies any headache, dizziness, SOB, CP, abdominal pain, nausea, vomiting, dysuria.  Other ROS reviewed and are negative.    Vital Signs Last 24 Hrs  T(C): 36.5 (2019 07:34), Max: 36.9 (2019 23:20)  T(F): 97.7 (2019 07:34), Max: 98.5 (2019 23:20)  HR: 59 (2019 07:34) (50 - 70)  BP: 122/62 (2019 07:34) (97/60 - 174/62)  BP(mean): --  RR: 18 (2019 07:34) (16 - 18)  SpO2: 96% (2019 07:34) (92% - 98%)  I&O's Detail    2019 07:01  -  2019 07:00  --------------------------------------------------------  IN:    Oral Fluid: 120 mL  Total IN: 120 mL    OUT:    Indwelling Catheter - Urethral: 750 mL  Total OUT: 750 mL    Total NET: -630 mL        PHYSICAL EXAM:  GENERAL: NAD  HEAD:  Normocephalic, right orbit with ecchymosis  NECK: Supple, No JVD, Normal thyroid  NERVOUS SYSTEM:  Alert & Oriented X3, Good concentration; generalized weakness  CHEST/LUNG: Clear to auscultation bilaterally; No rales, rhonchi, wheezing, or rubs  HEART: Regular rate and rhythm; No murmurs, rubs, or gallops  ABDOMEN: Soft, Nontender, Nondistended; Bowel sounds present; (+) pedroza  EXTREMITIES:  2+ Peripheral Pulses, No clubbing, cyanosis, or edema                              9.7    6.14  )-----------( 114      ( 2019 07:57 )             30.8     2019 07:57    138    |  106    |  46.0   ----------------------------<  80     4.8     |  21.0   |  2.27     Ca    8.8        2019 07:57  Phos  3.3       2019 07:57  Mg     2.2       2019 07:57        CAPILLARY BLOOD GLUCOSE  POCT Blood Glucose.: 70 mg/dL (2019 06:11)  POCT Blood Glucose.: 116 mg/dL (2019 23:10)  POCT Blood Glucose.: 91 mg/dL (2019 17:40)      Urinalysis Basic - ( 2019 15:04 )    Color: Yellow / Appearance: Clear / S.015 / pH: x  Gluc: x / Ketone: Negative  / Bili: Negative / Urobili: Negative   Blood: x / Protein: 100 / Nitrite: Positive   Leuk Esterase: Moderate / RBC: 3-5 /HPF / WBC 26-50   Sq Epi: x / Non Sq Epi: Occasional / Bacteria: Moderate    Culture - Urine (19 @ 15:04)    -  Amikacin: S <=16    -  Ampicillin: R >16 These ampicillin results predict results for amoxicillin    -  Ampicillin/Sulbactam: I  Enterobacter, Citrobacter, and Serratia may develop resistance during prolonged therapy (3-4 days)    -  Aztreonam: S <=4    -  Cefazolin: S <=8 (MIC_CL_COM_ENTERIC_CEFAZU) For uncomplicated UTI with K. pneumoniae, E. coli, or P. mirablis: NAYE <=16 is sensitive and NAYE >=32 is resistant. This also predicts results for oral agents cefaclor, cefdinir, cefpodoxime, cefprozil, cefuroxime axetil, cephalexin and locarbef for uncomplicated UTI. Note that some isolates may be susceptible to these agents while testing resistant to cefazolin.    -  Cefepime: S <=4    -  Cefoxitin: S <=8    -  Ceftriaxone: S <=1 Enterobacter, Citrobacter, and Serratia may develop resistance during prolonged therapy    -  Ciprofloxacin: S <=1    -  Ertapenem: S <=1    -  Gentamicin: S <=4    -  Imipenem: S <=1    -  Levofloxacin: S <=2    -  Meropenem: S <=1    -  Nitrofurantoin: S <=32 Should not be used to treat pyelonephritis    -  Piperacillin/Tazobactam: S <=16    -  Tigecycline: S <=2    -  Tobramycin: S <=4    -  Trimethoprim/Sulfamethoxazole: R >    Specimen Source: .Urine    Culture Results:   >100,000 CFU/ml Escherichia coli    Organism Identification: Escherichia coli    Organism: Escherichia coli    Method Type: NAYE        Hemoglobin A1C, Whole Blood: 4.9 % (19 @ 10:22)    MEDICATIONS  (STANDING):  acetaminophen   Tablet .. 650 milliGRAM(s) Oral every 6 hours  brimonidine 0.2% Ophthalmic Solution 1 Drop(s) Right EYE every 8 hours  dextrose 5%. 1000 milliLiter(s) (60 mL/Hr) IV Continuous <Continuous>  docusate sodium Oral Tab/Cap - Peds 100 milliGRAM(s) Oral three times a day  ferrous sulfate Oral Tab/Cap - Peds 325 milliGRAM(s) Oral two times a day with meals  hydrALAZINE 25 milliGRAM(s) Oral two times a day  influenza   Vaccine 0.5 milliLiter(s) IntraMuscular once  latanoprost 0.005% Ophthalmic Solution 1 Drop(s) Right EYE at bedtime  levothyroxine 25 MICROGram(s) Oral daily  lidocaine   Patch 1 Patch Transdermal daily  meropenem  IVPB 500 milliGRAM(s) IV Intermittent every 12 hours  polyethylene glycol 3350 17 Gram(s) Oral daily  senna 2 Tablet(s) Oral at bedtime  timolol 0.5% Solution 1 Drop(s) Right EYE every 12 hours    MEDICATIONS  (PRN):  hydrALAZINE Injectable 10 milliGRAM(s) IV Push every 6 hours PRN for SBP > 150

## 2019-11-08 NOTE — PROGRESS NOTE ADULT - ASSESSMENT
89 year old female with PMH HTN, hypothyroidism, Glaucoma presented with facial fractures after fall - unclear circumstances of fall.       Hypothermia-resolved. Likely from hypoglycemia vs from infection. Swallow eval (pt does not like swallow recommendations).    mildly elevated TSH, not likely the cause of hypothermia. f/u AM cortisol.    Sinus nicole- likely when alseep, monitor on tele for now. Cardio signed off. Daughter not wanting aggressive measures     UTI- Continue Meropenem, f/u Ucx, Blood cx. Bourne placed at [facility] for retention.  called    Urinary retention- Bourne placed at [facility] for retention.  called. Last BM unknown by nursing. Continue bowel regimen    Hypoglycemia- D5 started. Now improved. f/u FS      Orbital fracture - No intervention  - Continue Tylenol    Fall, pause on monitoring  - Continue to monitor  - PT evaluation- [facility]  - Cardiology note appreciated. Pt & family wishes that they and the patient would not want any invasive or aggressive intervention such as TAVR in the event her aortic stenosis is now severe. In light of this, repeat echocardiogram not warranted at this time.      Hyperkalemia, likely secondary to hemolysis from fall  - Received Lokelma with resolution    CKD 4  - IVF  - Monitor SCr, K    HTN  - No medications    Hypothyroidism  - Continue Synthroid  Mildly elevated TSH, repeat as outpatient    Anemia   - Continue Iron supplement  - monitor Hgb    Glaucoma  - Continue eye drops    Prognosis - Guarded.    Prior hospitalist discussed with grand daughter - patient and daughter do not want aggressive measure. Pt is DNR/DNI  [facility] RN met with patient and patient's daughter at patient bedside. As per daughter Morena, plan is for patient to go to [facility]

## 2019-11-08 NOTE — CONSULT NOTE ADULT - SUBJECTIVE AND OBJECTIVE BOX
HPI:   89 year old female with PMH significant for chronic CHF, Glaucoma, HTN and hypothyroidism presented [facility] ED after sustaining a ground level fall earlier today. Patient states that she was getting up from her bed when she felt weak, loss her balance and fell. Patient could not provide further details regarding the incident. Patient taking ASA-81mg daily. Upon further workup, CT head and maxillofacial revealed comminuted fracture of right lateral orbital wall, hemorrhage within retrobulbar lateral extraconal space of right orbit as well as w nondisplaced fracture of right lateral maxillary wall.     While in the ED it was noted that the patient had increased IOP, Ophthalmology recommended lateral canthotomy and several eye drops to aid in reducing IOP. Patient with no major complaints other than right periorbital pain, mild headache but denies nausea nor emesis. (2019 22:31)    Urology: called to evaluate pt for urinary retention.  Pt was voiding on her own upon admission, it was noticed she had decreased output, pt was bladder scanned, showed 700ml urine in bladder. Bourne cath placed  and 1 L drained, yellow urine.  Pt resting comfortably in bed, no complaints at this time.  Pt states she was able to ambulate at home prior to admission.  She denies any urinary complaints, no hematuria, no hesitancy, states she feels empty after voiding, no UTI's.  Pt has not been ambulating much since being admitted.    ? kidney disease prior to admission, creatinine 1.8-3.0 in     PAST MEDICAL & SURGICAL HISTORY:  Chronic CHF  Glaucoma  HTN (hypertension)  Breast cancer: s/p RT lumpectomy and radiation  Hypothyroid  S/P cataract extraction: bilateral  S/P lumpectomy, right breast      acetaminophen   Tablet .. 650 milliGRAM(s) Oral every 6 hours  brimonidine 0.2% Ophthalmic Solution 1 Drop(s) Right EYE every 8 hours  dextrose 5%. 1000 milliLiter(s) IV Continuous <Continuous>  docusate sodium Oral Tab/Cap - Peds 100 milliGRAM(s) Oral three times a day  ferrous sulfate Oral Tab/Cap - Peds 325 milliGRAM(s) Oral two times a day with meals  hydrALAZINE 25 milliGRAM(s) Oral two times a day  hydrALAZINE Injectable 10 milliGRAM(s) IV Push every 6 hours PRN  influenza   Vaccine 0.5 milliLiter(s) IntraMuscular once  latanoprost 0.005% Ophthalmic Solution 1 Drop(s) Right EYE at bedtime  levothyroxine 25 MICROGram(s) Oral daily  lidocaine   Patch 1 Patch Transdermal daily  meropenem  IVPB 500 milliGRAM(s) IV Intermittent every 12 hours  polyethylene glycol 3350 17 Gram(s) Oral daily  senna 2 Tablet(s) Oral at bedtime  timolol 0.5% Solution 1 Drop(s) Right EYE every 12 hours      codeine (Vomiting)  penicillin (Rash; Anaphylaxis)  Sulfacetamide Sodium (Rash)      T(C): 36.5 (19 @ 07:34), Max: 36.9 (19 @ 23:20)  HR: 59 (19 @ 07:34) (50 - 70)  BP: 122/62 (19 @ 07:34) (97/60 - 174/62)  RR: 18 (19 @ 07:34) (16 - 18)  SpO2: 96% (19 @ 07:34) (92% - 98%)      19 @ 07:01  -  19 @ 07:00  --------------------------------------------------------  IN: 120 mL / OUT: 750 mL / NET: -630 mL                              9.7    6.14  )-----------( 114      ( 2019 07:57 )             30.8           138  |  106  |  46.0<H>  ----------------------------<  80  4.8   |  21.0<L>  |  2.27<H>    Ca    8.8      2019 07:57  Phos  3.3       Mg     2.2             Urinalysis Basic - ( 2019 15:04 )    Color: Yellow / Appearance: Clear / S.015 / pH: x  Gluc: x / Ketone: Negative  / Bili: Negative / Urobili: Negative   Blood: x / Protein: 100 / Nitrite: Positive   Leuk Esterase: Moderate / RBC: 3-5 /HPF / WBC 26-50   Sq Epi: x / Non Sq Epi: Occasional / Bacteria: Moderate            Radiology:

## 2019-11-08 NOTE — CHART NOTE - NSCHARTNOTEFT_GEN_A_CORE
Patient's daughter Morena and I spoke yesterday  Patient remains DNR / DNI [facility]OL[facility]  Plan for [facility] placement - preferably [facility] per daughter   Discussed Hospice as a community resource available for her mother. Daughter is open to the concept but at this time she doesn't want to pursue hospice. Informed daughter that if while she is at Rehab if her mother's condition declines she can always reach out to hospice from there.  At this time will sign off, if GOC change please feel free to reconsult with our team    Thank you for the opportunity to assist with the care of this patient.   [facility] [phone].

## 2019-11-08 NOTE — CHART NOTE - NSCHARTNOTEFT_GEN_A_CORE
Nutrition note:   Please consider changing diet to DASH/TLC with Ensure TID. Pt currently receiving puree diet and states tolerating regular consistency at home and denies difficulty chewing or swallowing. Thank you.

## 2019-11-08 NOTE — DISCHARGE NOTE NURSING/CASE MANAGEMENT/SOCIAL WORK - PATIENT PORTAL LINK FT
You can access the FollowMyHealth Patient Portal offered by [facility] by registering at the following website: [email or web address]. By joining [employer]’s FollowMyHealth portal, you will also be able to view your health information using other applications (apps) compatible with our system.

## 2019-11-11 LAB
CULTURE RESULTS: SIGNIFICANT CHANGE UP
CULTURE RESULTS: SIGNIFICANT CHANGE UP
SPECIMEN SOURCE: SIGNIFICANT CHANGE UP
SPECIMEN SOURCE: SIGNIFICANT CHANGE UP

## 2019-11-12 PROCEDURE — P9016: CPT

## 2019-11-12 PROCEDURE — 90715 TDAP VACCINE 7 YRS/> IM: CPT

## 2019-11-12 PROCEDURE — 67715 CANTHOTOMY: CPT | Mod: RT

## 2019-11-12 PROCEDURE — 80048 BASIC METABOLIC PNL TOTAL CA: CPT

## 2019-11-12 PROCEDURE — 84443 ASSAY THYROID STIM HORMONE: CPT

## 2019-11-12 PROCEDURE — 83880 ASSAY OF NATRIURETIC PEPTIDE: CPT

## 2019-11-12 PROCEDURE — 84484 ASSAY OF TROPONIN QUANT: CPT

## 2019-11-12 PROCEDURE — 85027 COMPLETE CBC AUTOMATED: CPT

## 2019-11-12 PROCEDURE — 36415 COLL VENOUS BLD VENIPUNCTURE: CPT

## 2019-11-12 PROCEDURE — 87086 URINE CULTURE/COLONY COUNT: CPT

## 2019-11-12 PROCEDURE — 71250 CT THORAX DX C-: CPT

## 2019-11-12 PROCEDURE — 86901 BLOOD TYPING SEROLOGIC RH(D): CPT

## 2019-11-12 PROCEDURE — 82962 GLUCOSE BLOOD TEST: CPT

## 2019-11-12 PROCEDURE — 70486 CT MAXILLOFACIAL W/O DYE: CPT

## 2019-11-12 PROCEDURE — 87186 SC STD MICRODIL/AGAR DIL: CPT

## 2019-11-12 PROCEDURE — 97167 OT EVAL HIGH COMPLEX 60 MIN: CPT

## 2019-11-12 PROCEDURE — 84436 ASSAY OF TOTAL THYROXINE: CPT

## 2019-11-12 PROCEDURE — 85730 THROMBOPLASTIN TIME PARTIAL: CPT

## 2019-11-12 PROCEDURE — 71045 X-RAY EXAM CHEST 1 VIEW: CPT

## 2019-11-12 PROCEDURE — 84145 PROCALCITONIN (PCT): CPT

## 2019-11-12 PROCEDURE — 84100 ASSAY OF PHOSPHORUS: CPT

## 2019-11-12 PROCEDURE — 83735 ASSAY OF MAGNESIUM: CPT

## 2019-11-12 PROCEDURE — 84480 ASSAY TRIIODOTHYRONINE (T3): CPT

## 2019-11-12 PROCEDURE — 87040 BLOOD CULTURE FOR BACTERIA: CPT

## 2019-11-12 PROCEDURE — 82803 BLOOD GASES ANY COMBINATION: CPT

## 2019-11-12 PROCEDURE — 86900 BLOOD TYPING SEROLOGIC ABO: CPT

## 2019-11-12 PROCEDURE — 81001 URINALYSIS AUTO W/SCOPE: CPT

## 2019-11-12 PROCEDURE — 36430 TRANSFUSION BLD/BLD COMPNT: CPT

## 2019-11-12 PROCEDURE — 72125 CT NECK SPINE W/O DYE: CPT

## 2019-11-12 PROCEDURE — 99285 EMERGENCY DEPT VISIT HI MDM: CPT | Mod: 25

## 2019-11-12 PROCEDURE — 97163 PT EVAL HIGH COMPLEX 45 MIN: CPT

## 2019-11-12 PROCEDURE — 83605 ASSAY OF LACTIC ACID: CPT

## 2019-11-12 PROCEDURE — 83036 HEMOGLOBIN GLYCOSYLATED A1C: CPT

## 2019-11-12 PROCEDURE — 93005 ELECTROCARDIOGRAM TRACING: CPT

## 2019-11-12 PROCEDURE — 97116 GAIT TRAINING THERAPY: CPT

## 2019-11-12 PROCEDURE — 97530 THERAPEUTIC ACTIVITIES: CPT

## 2019-11-12 PROCEDURE — 86850 RBC ANTIBODY SCREEN: CPT

## 2019-11-12 PROCEDURE — 70450 CT HEAD/BRAIN W/O DYE: CPT

## 2019-11-12 PROCEDURE — 94640 AIRWAY INHALATION TREATMENT: CPT

## 2019-11-12 PROCEDURE — 82533 TOTAL CORTISOL: CPT

## 2019-11-12 PROCEDURE — 80053 COMPREHEN METABOLIC PANEL: CPT

## 2019-11-12 PROCEDURE — 85610 PROTHROMBIN TIME: CPT

## 2019-11-12 PROCEDURE — 86923 COMPATIBILITY TEST ELECTRIC: CPT

## 2019-11-13 NOTE — CDI QUERY NOTE - NSCDIOTHERTXTBX_GEN_ALL_CORE_HH
11/3- Admit with BP 84/42, T 93.6    H&P- presented [facility] ED after sustaining a ground level fall earlier today.   Orbital fracture, open     11/6  Urine Culture Results:   >100,000 CFU/ml Escherichia coli    11/7 ID-PT DEVELOPED HYPOTHERMIA WITH CONCERN FOR INFECTIOUS PROCESS  CURRENTLY  AWAKE LETHARGIC ON WARMING BLANKET  URINE CX GM NEG RODS 100K  BLOOD CX ARE PENDING  WILL RX  MERREM TO COVER BROADLY UNTIL CX ARE BACK    Discharge note-  Pt was found to be hypothermic due to sepsis & UTI on IV abx started by ID & switched to PO.    Please clarify if sepsis present on admission or occurred after admission since sepsis not documented until d/c note.  Thank you.    1) Sepsis secondary to UTI evolving on admission resolved  2) Sepsis secondary to UTI POA  3) After study, sepsis ruled out  4) Other  5) Not clinically significant

## 2019-12-20 ENCOUNTER — APPOINTMENT (OUTPATIENT)
Dept: FAMILY MEDICINE | Facility: CLINIC | Age: 84
End: 2019-12-20
Payer: MEDICARE

## 2019-12-20 VITALS
HEART RATE: 80 BPM | WEIGHT: 80 LBS | HEIGHT: 56 IN | SYSTOLIC BLOOD PRESSURE: 170 MMHG | DIASTOLIC BLOOD PRESSURE: 100 MMHG | BODY MASS INDEX: 18 KG/M2

## 2019-12-20 DIAGNOSIS — Z09 ENCOUNTER FOR FOLLOW-UP EXAMINATION AFTER COMPLETED TREATMENT FOR CONDITIONS OTHER THAN MALIGNANT NEOPLASM: ICD-10-CM

## 2019-12-20 DIAGNOSIS — S02.85XA FX OF ORBIT, UNSP, INT ENC FOR CL: ICD-10-CM

## 2019-12-20 PROCEDURE — 99215 OFFICE O/P EST HI 40 MIN: CPT

## 2019-12-27 ENCOUNTER — RX RENEWAL (OUTPATIENT)
Age: 84
End: 2019-12-27

## 2020-01-01 ENCOUNTER — APPOINTMENT (OUTPATIENT)
Dept: FAMILY MEDICINE | Facility: CLINIC | Age: 85
End: 2020-01-01
Payer: MEDICARE

## 2020-01-01 ENCOUNTER — RX RENEWAL (OUTPATIENT)
Age: 85
End: 2020-01-01

## 2020-01-01 ENCOUNTER — TRANSCRIPTION ENCOUNTER (OUTPATIENT)
Age: 85
End: 2020-01-01

## 2020-01-01 ENCOUNTER — LABORATORY RESULT (OUTPATIENT)
Age: 85
End: 2020-01-01

## 2020-01-01 VITALS
HEIGHT: 56 IN | OXYGEN SATURATION: 98 % | DIASTOLIC BLOOD PRESSURE: 60 MMHG | HEART RATE: 62 BPM | BODY MASS INDEX: 16.42 KG/M2 | SYSTOLIC BLOOD PRESSURE: 140 MMHG | WEIGHT: 73 LBS

## 2020-01-01 VITALS — SYSTOLIC BLOOD PRESSURE: 160 MMHG | DIASTOLIC BLOOD PRESSURE: 84 MMHG

## 2020-01-01 DIAGNOSIS — E87.5 HYPERKALEMIA: ICD-10-CM

## 2020-01-01 DIAGNOSIS — R53.83 OTHER FATIGUE: ICD-10-CM

## 2020-01-01 DIAGNOSIS — D64.9 ANEMIA, UNSPECIFIED: ICD-10-CM

## 2020-01-01 DIAGNOSIS — Z87.898 PERSONAL HISTORY OF OTHER SPECIFIED CONDITIONS: ICD-10-CM

## 2020-01-01 DIAGNOSIS — N18.9 CHRONIC KIDNEY DISEASE, UNSPECIFIED: ICD-10-CM

## 2020-01-01 DIAGNOSIS — I50.9 HEART FAILURE, UNSPECIFIED: ICD-10-CM

## 2020-01-01 DIAGNOSIS — R54 AGE-RELATED PHYSICAL DEBILITY: ICD-10-CM

## 2020-01-01 DIAGNOSIS — E03.9 HYPOTHYROIDISM, UNSPECIFIED: ICD-10-CM

## 2020-01-01 LAB
ALBUMIN SERPL ELPH-MCNC: 4.3 G/DL
ALBUMIN SERPL ELPH-MCNC: 4.3 G/DL
ALBUMIN SERPL ELPH-MCNC: 4.6 G/DL
ALP BLD-CCNC: 104 U/L
ALP BLD-CCNC: 90 U/L
ALP BLD-CCNC: 98 U/L
ALT SERPL-CCNC: 24 U/L
ALT SERPL-CCNC: 7 U/L
ALT SERPL-CCNC: 8 U/L
ANION GAP SERPL CALC-SCNC: 15 MMOL/L
ANION GAP SERPL CALC-SCNC: 15 MMOL/L
ANION GAP SERPL CALC-SCNC: 17 MMOL/L
AST SERPL-CCNC: 16 U/L
AST SERPL-CCNC: 18 U/L
AST SERPL-CCNC: 29 U/L
BASOPHILS # BLD AUTO: 0 K/UL
BASOPHILS # BLD AUTO: 0.02 K/UL
BASOPHILS # BLD AUTO: 0.12 K/UL
BASOPHILS NFR BLD AUTO: 0 %
BASOPHILS NFR BLD AUTO: 0.7 %
BASOPHILS NFR BLD AUTO: 2.6 %
BILIRUB SERPL-MCNC: 0.3 MG/DL
BUN SERPL-MCNC: 46 MG/DL
BUN SERPL-MCNC: 54 MG/DL
BUN SERPL-MCNC: 74 MG/DL
CALCIUM SERPL-MCNC: 9.1 MG/DL
CALCIUM SERPL-MCNC: 9.3 MG/DL
CALCIUM SERPL-MCNC: 9.9 MG/DL
CHLORIDE SERPL-SCNC: 102 MMOL/L
CHLORIDE SERPL-SCNC: 105 MMOL/L
CHLORIDE SERPL-SCNC: 111 MMOL/L
CO2 SERPL-SCNC: 21 MMOL/L
CO2 SERPL-SCNC: 21 MMOL/L
CO2 SERPL-SCNC: 23 MMOL/L
CREAT SERPL-MCNC: 3.22 MG/DL
CREAT SERPL-MCNC: 3.23 MG/DL
CREAT SERPL-MCNC: 3.98 MG/DL
EOSINOPHIL # BLD AUTO: 0 K/UL
EOSINOPHIL # BLD AUTO: 0.02 K/UL
EOSINOPHIL # BLD AUTO: 0.04 K/UL
EOSINOPHIL NFR BLD AUTO: 0 %
EOSINOPHIL NFR BLD AUTO: 0.7 %
EOSINOPHIL NFR BLD AUTO: 0.9 %
FERRITIN SERPL-MCNC: 621 NG/ML
FOLATE SERPL-MCNC: 4.7 NG/ML
GLUCOSE SERPL-MCNC: 114 MG/DL
GLUCOSE SERPL-MCNC: 95 MG/DL
GLUCOSE SERPL-MCNC: 96 MG/DL
HCT VFR BLD CALC: 26.2 %
HCT VFR BLD CALC: 26.3 %
HCT VFR BLD CALC: 29.1 %
HGB BLD-MCNC: 8 G/DL
HGB BLD-MCNC: 8.2 G/DL
HGB BLD-MCNC: 8.7 G/DL
IMM GRANULOCYTES NFR BLD AUTO: 0.3 %
IRON SATN MFR SERPL: 16 %
IRON SERPL-MCNC: 43 UG/DL
LYMPHOCYTES # BLD AUTO: 0.47 K/UL
LYMPHOCYTES # BLD AUTO: 0.78 K/UL
LYMPHOCYTES # BLD AUTO: 1.84 K/UL
LYMPHOCYTES NFR BLD AUTO: 26.8 %
LYMPHOCYTES NFR BLD AUTO: 41.2 %
LYMPHOCYTES NFR BLD AUTO: 6.1 %
MAN DIFF?: NORMAL
MCHC RBC-ENTMCNC: 29.9 GM/DL
MCHC RBC-ENTMCNC: 30.5 GM/DL
MCHC RBC-ENTMCNC: 31.2 GM/DL
MCHC RBC-ENTMCNC: 34 PG
MCHC RBC-ENTMCNC: 34.2 PG
MCHC RBC-ENTMCNC: 34.5 PG
MCV RBC AUTO: 110.5 FL
MCV RBC AUTO: 112 FL
MCV RBC AUTO: 113.7 FL
MONOCYTES # BLD AUTO: 0.37 K/UL
MONOCYTES # BLD AUTO: 0.4 K/UL
MONOCYTES # BLD AUTO: 0.43 K/UL
MONOCYTES NFR BLD AUTO: 12.7 %
MONOCYTES NFR BLD AUTO: 5.2 %
MONOCYTES NFR BLD AUTO: 9.7 %
NEUTROPHILS # BLD AUTO: 1.71 K/UL
NEUTROPHILS # BLD AUTO: 2.03 K/UL
NEUTROPHILS # BLD AUTO: 6.87 K/UL
NEUTROPHILS NFR BLD AUTO: 45.6 %
NEUTROPHILS NFR BLD AUTO: 58.8 %
NEUTROPHILS NFR BLD AUTO: 87 %
PLATELET # BLD AUTO: 115 K/UL
PLATELET # BLD AUTO: 123 K/UL
PLATELET # BLD AUTO: 155 K/UL
POTASSIUM SERPL-SCNC: 4.1 MMOL/L
POTASSIUM SERPL-SCNC: 4.5 MMOL/L
POTASSIUM SERPL-SCNC: 5.2 MMOL/L
POTASSIUM SERPL-SCNC: 6.3 MMOL/L
PROT SERPL-MCNC: 6.9 G/DL
PROT SERPL-MCNC: 7 G/DL
PROT SERPL-MCNC: 7.2 G/DL
RBC # BLD: 2.34 M/UL
RBC # BLD: 2.38 M/UL
RBC # BLD: 2.56 M/UL
RBC # FLD: 14 %
RBC # FLD: 17.2 %
RBC # FLD: 17.3 %
SODIUM SERPL-SCNC: 140 MMOL/L
SODIUM SERPL-SCNC: 143 MMOL/L
SODIUM SERPL-SCNC: 146 MMOL/L
TIBC SERPL-MCNC: 278 UG/DL
TSH SERPL-ACNC: 6.18 UIU/ML
TSH SERPL-ACNC: 6.62 UIU/ML
UIBC SERPL-MCNC: 235 UG/DL
VIT B12 SERPL-MCNC: 599 PG/ML
WBC # FLD AUTO: 2.91 K/UL
WBC # FLD AUTO: 4.46 K/UL
WBC # FLD AUTO: 7.74 K/UL

## 2020-01-01 PROCEDURE — 99214 OFFICE O/P EST MOD 30 MIN: CPT | Mod: 25

## 2020-01-01 PROCEDURE — 99213 OFFICE O/P EST LOW 20 MIN: CPT

## 2020-01-01 PROCEDURE — 99213 OFFICE O/P EST LOW 20 MIN: CPT | Mod: 95

## 2020-01-01 PROCEDURE — 36415 COLL VENOUS BLD VENIPUNCTURE: CPT

## 2020-01-01 PROCEDURE — 99214 OFFICE O/P EST MOD 30 MIN: CPT | Mod: 95

## 2020-01-01 RX ORDER — LEVOTHYROXINE SODIUM 25 UG/1
25 TABLET ORAL
Qty: 90 | Refills: 3 | Status: ACTIVE | COMMUNITY
Start: 2018-10-18 | End: 1900-01-01

## 2020-01-01 RX ORDER — METOLAZONE 2.5 MG/1
2.5 TABLET ORAL DAILY
Qty: 3 | Refills: 0 | Status: ACTIVE | COMMUNITY
Start: 2020-01-01 | End: 1900-01-01

## 2020-01-01 RX ORDER — LIDOCAINE HCL 4 %
4 LIQUID ROLL-ON (ML) TOPICAL
Qty: 30 | Refills: 0 | Status: ACTIVE | COMMUNITY
Start: 2020-03-09 | End: 1900-01-01

## 2020-01-01 RX ORDER — HYDRALAZINE HYDROCHLORIDE 50 MG/1
50 TABLET ORAL
Qty: 270 | Refills: 0 | Status: ACTIVE | COMMUNITY
Start: 2019-06-25 | End: 1900-01-01

## 2020-01-01 RX ORDER — ERGOCALCIFEROL 1.25 MG/1
1.25 MG CAPSULE, LIQUID FILLED ORAL
Qty: 12 | Refills: 1 | Status: ACTIVE | COMMUNITY
Start: 2018-03-29 | End: 1900-01-01

## 2020-01-01 RX ORDER — FUROSEMIDE 20 MG/1
20 TABLET ORAL
Qty: 90 | Refills: 0 | Status: ACTIVE | COMMUNITY
Start: 2019-07-01 | End: 1900-01-01

## 2020-01-01 RX ORDER — PRAVASTATIN SODIUM 20 MG/1
20 TABLET ORAL
Qty: 30 | Refills: 0 | Status: DISCONTINUED | COMMUNITY
End: 2020-01-01

## 2020-01-01 RX ORDER — NITROGLYCERIN 80 MG/1
0.4 PATCH TRANSDERMAL DAILY
Qty: 30 | Refills: 0 | Status: ACTIVE | COMMUNITY
Start: 2020-03-09 | End: 1900-01-01

## 2020-01-01 RX ORDER — SODIUM POLYSTYRENE SULFONATE 15 G/60ML
15 SUSPENSION ORAL; RECTAL
Qty: 60 | Refills: 0 | Status: ACTIVE | COMMUNITY
Start: 2020-01-01 | End: 1900-01-01

## 2020-01-01 RX ORDER — FUROSEMIDE 20 MG/1
20 TABLET ORAL DAILY
Qty: 90 | Refills: 0 | Status: ACTIVE | COMMUNITY
Start: 2019-06-25 | End: 1900-01-01

## 2020-01-01 RX ORDER — LEVOTHYROXINE SODIUM 25 UG/1
25 TABLET ORAL
Qty: 108 | Refills: 1 | Status: DISCONTINUED | COMMUNITY
Start: 2020-01-01 | End: 2020-01-01

## 2020-01-05 PROBLEM — Z09 HOSPITAL DISCHARGE FOLLOW-UP: Status: ACTIVE | Noted: 2020-01-05

## 2020-01-05 PROBLEM — S02.85XA ORBITAL FRACTURE: Status: ACTIVE | Noted: 2020-01-05

## 2020-01-05 NOTE — COUNSELING
[Fall prevention counseling provided] : Fall prevention counseling provided [Adequate lighting] : Adequate lighting [Use recommended devices] : Use recommended devices [No throw rugs] : No throw rugs [Use proper foot wear] : Use proper foot wear

## 2020-02-03 NOTE — PHYSICAL EXAM
[No Acute Distress] : no acute distress [Well Nourished] : well nourished [Well Developed] : well developed [Well-Appearing] : well-appearing [Normal Sclera/Conjunctiva] : normal sclera/conjunctiva [PERRL] : pupils equal round and reactive to light [EOMI] : extraocular movements intact [Normal Outer Ear/Nose] : the outer ears and nose were normal in appearance [Normal Oropharynx] : the oropharynx was normal [No JVD] : no jugular venous distention [No Lymphadenopathy] : no lymphadenopathy [Supple] : supple [Thyroid Normal, No Nodules] : the thyroid was normal and there were no nodules present [No Respiratory Distress] : no respiratory distress  [No Accessory Muscle Use] : no accessory muscle use [Clear to Auscultation] : lungs were clear to auscultation bilaterally [Normal Rate] : normal rate  [Regular Rhythm] : with a regular rhythm [Normal S1, S2] : normal S1 and S2 [No Murmur] : no murmur heard [No Carotid Bruits] : no carotid bruits [No Abdominal Bruit] : a ~M bruit was not heard ~T in the abdomen [No Varicosities] : no varicosities [Pedal Pulses Present] : the pedal pulses are present [No Edema] : there was no peripheral edema [No Palpable Aorta] : no palpable aorta [No Extremity Clubbing/Cyanosis] : no extremity clubbing/cyanosis [Soft] : abdomen soft [Non Tender] : non-tender [Non-distended] : non-distended [No Masses] : no abdominal mass palpated [No HSM] : no HSM [Normal Bowel Sounds] : normal bowel sounds [Normal Posterior Cervical Nodes] : no posterior cervical lymphadenopathy [Normal Anterior Cervical Nodes] : no anterior cervical lymphadenopathy [No CVA Tenderness] : no CVA  tenderness [No Spinal Tenderness] : no spinal tenderness [No Joint Swelling] : no joint swelling [Grossly Normal Strength/Tone] : grossly normal strength/tone [No Rash] : no rash [Coordination Grossly Intact] : coordination grossly intact [No Focal Deficits] : no focal deficits [Normal Gait] : normal gait [Deep Tendon Reflexes (DTR)] : deep tendon reflexes were 2+ and symmetric [Normal Affect] : the affect was normal [Normal Insight/Judgement] : insight and judgment were intact [de-identified] : muscle spasm

## 2020-02-03 NOTE — HISTORY OF PRESENT ILLNESS
[Post-hospitalization from ___ Hospital] : Post-hospitalization from [unfilled] Hospital [FreeTextEntry2] : Hospital Course: Upon imaging in ED, CT head and maxillofacial revealed \par comminuted fracture of right lateral orbital wall, hemorrhage within \par retrobulbar lateral extraconal space of right orbit as well as a nondisplaced \par fracture of right lateral maxillary wall. While in the ED it was noted that the \par patient had increased IOP. Ophthalmology recommended lateral canthotomy and \par several eye drops to aid in reducing IOP. Patient was admitted to Trauma \par service for continued monitoring. Patient had NAVIN upon admission for which she \par received IV hydration. BUN/Cr continued to trend down throughout stay. Patient \par had episode of symptomatic anemia with HGB 6.7. She was transfused with 2 units \par PRBC and responded appropriately up to HGB of 10.4. Cardiology was consulted \par for aortic stenosis being cause of possible syncope. They had long conversation \par with family and family expressed they do not wish to have any invasive or \par aggressive interventions such as TAVR in the event her aortic stenosis is now \par severe. No further workup to assess aortic stenosis was recommended.  \par Lorna was consulted and recommended conservative therapy, outpatient follow \par up.  Pt was found to be hypothermic due to sepsis & UTI on IV abx started by ID \par & switched to PO. Blood cx was -ve so far. Cleared by ID Dr. ely for d/c. \par as per PT/OT was consulted to evaluate the patient and recommended \par ____SAR__________. Patient was tolerating soft PO diet well. Swallow eval to \par follow at [facility] for diet advancement. Pain was well controlled at the time of \par discharge. Pt was retaining urine, pedroza was placed. Seen by urology who \par suggested TOV at [facility] when more ambulatory. . Patient was stable for discharge \par to ___SAR________. \par \par \par Hypothermia-resolved. Likely from hypoglycemia vs from infection. Swallow eval \par to follow in [facility](pt does not like swallow recommendations). \par mildly elevated TSH, not likely the cause of hypothermia. f/u AM cortisol. \par \par Sinus nicole- mostly when alseep, monitor on tele for now. Cardio signed off. \par Daughter not wanting aggressive measures \par \par Ecoli UTI- Merrem swtiched to PO vantin. First dose given with no reaction. \par Blood cx NGTD. Pedroza placed at [facility] for retention.  called \par \par Urinary retention- Pedroza placed at [facility] for retention.  called. Maintain pedroza \par with TOV when more ambulatory. Last BM unknown by nursing. Continue bowel \par regimen \par \par Hypoglycemia- s/p D5. Now improved. Encourage PO intake. f/u FS \par \par \par Orbital fracture - No intervention \par - Continue Tylenol \par \par Fall, pause on monitoring \par - Continue to monitor \par - PT evaluation- NAN \par - Cardiology note appreciated. Pt & family wishes that they and the patient \par would not want any invasive or aggressive intervention such as TAVR in the \par event her aortic stenosis is now severe. In light of this, repeat \par echocardiogram not warranted at this time. \par \par \par Hyperkalemia, likely secondary to hemolysis from fall \par - Received Lokelma with resolution \par \par CKD 4 \par - IVF \par - Monitor SCr, K \par \par HTN \par - No medications \par \par Hypothyroidism \par - Continue Synthroid \par Mildly elevated TSH, repeat as outpatient \par \par Anemia \par - Continue Iron supplement \par - monitor Hgb \par \par Glaucoma \par - Continue eye drops \par 12/2019- here with daughter went to [facility] for [facility] that caused bruising bleeding and orbital fracture now stable see HD\par trevin has lot of neck pains and its bothers her no more then anything [FreeTextEntry1] : patient here to follow up after rehab [FreeTextEntry8] : patient granddaughter states patient was c/o SOB on Wednesday.  Patient states she is not short of breathe today, she is having neck pain today that’s going to head.\par Grand daughter thinks she may be anxious\par \par Today- here with daughter feels okay \par has lot of pain in neck area/ face is healing well

## 2020-02-03 NOTE — PLAN
[FreeTextEntry1] : muscle spasm- heat/ Ice may take tylenol- consider tramadol if pain is not controlled \par SOB- possible CHF? anxiety ?- continue current

## 2020-02-03 NOTE — HISTORY OF PRESENT ILLNESS
[Post-hospitalization from ___ Hospital] : Post-hospitalization from [unfilled] Hospital [FreeTextEntry2] : Hospital Course: Upon imaging in ED, CT head and maxillofacial revealed \par comminuted fracture of right lateral orbital wall, hemorrhage within \par retrobulbar lateral extraconal space of right orbit as well as a nondisplaced \par fracture of right lateral maxillary wall. While in the ED it was noted that the \par patient had increased IOP. Ophthalmology recommended lateral canthotomy and \par several eye drops to aid in reducing IOP. Patient was admitted to Trauma \par service for continued monitoring. Patient had NAVIN upon admission for which she \par received IV hydration. BUN/Cr continued to trend down throughout stay. Patient \par had episode of symptomatic anemia with HGB 6.7. She was transfused with 2 units \par PRBC and responded appropriately up to HGB of 10.4. Cardiology was consulted \par for aortic stenosis being cause of possible syncope. They had long conversation \par with family and family expressed they do not wish to have any invasive or \par aggressive interventions such as TAVR in the event her aortic stenosis is now \par severe. No further workup to assess aortic stenosis was recommended.  \par Lorna was consulted and recommended conservative therapy, outpatient follow \par up.  Pt was found to be hypothermic due to sepsis & UTI on IV abx started by ID \par & switched to PO. Blood cx was -ve so far. Cleared by ID Dr. ely for d/c. \par as per PT/OT was consulted to evaluate the patient and recommended \par ____SAR__________. Patient was tolerating soft PO diet well. Swallow eval to \par follow at [facility] for diet advancement. Pain was well controlled at the time of \par discharge. Pt was retaining urine, pedroza was placed. Seen by urology who \par suggested TOV at [facility] when more ambulatory. . Patient was stable for discharge \par to ___SAR________. \par \par \par Hypothermia-resolved. Likely from hypoglycemia vs from infection. Swallow eval \par to follow in [facility](pt does not like swallow recommendations). \par mildly elevated TSH, not likely the cause of hypothermia. f/u AM cortisol. \par \par Sinus nicole- mostly when alseep, monitor on tele for now. Cardio signed off. \par Daughter not wanting aggressive measures \par \par Ecoli UTI- Merrem swtiched to PO vantin. First dose given with no reaction. \par Blood cx NGTD. Pedroza placed at [facility] for retention.  called \par \par Urinary retention- Pedroza placed at [facility] for retention.  called. Maintain pedroza \par with TOV when more ambulatory. Last BM unknown by nursing. Continue bowel \par regimen \par \par Hypoglycemia- s/p D5. Now improved. Encourage PO intake. f/u FS \par \par \par Orbital fracture - No intervention \par - Continue Tylenol \par \par Fall, pause on monitoring \par - Continue to monitor \par - PT evaluation- NAN \par - Cardiology note appreciated. Pt & family wishes that they and the patient \par would not want any invasive or aggressive intervention such as TAVR in the \par event her aortic stenosis is now severe. In light of this, repeat \par echocardiogram not warranted at this time. \par \par \par Hyperkalemia, likely secondary to hemolysis from fall \par - Received Lokelma with resolution \par \par CKD 4 \par - IVF \par - Monitor SCr, K \par \par HTN \par - No medications \par \par Hypothyroidism \par - Continue Synthroid \par Mildly elevated TSH, repeat as outpatient \par \par Anemia \par - Continue Iron supplement \par - monitor Hgb \par \par Glaucoma \par - Continue eye drops \par 12/2019- here with daughter went to [facility] for fall that caused bruising bleeding and orbital fracture now stable see HD\par trevin has lot of neck pains and its bothers her no more then anything [FreeTextEntry1] : patient here to follow up after rehab [FreeTextEntry8] : patient granddaughter states patient was c/o SOB on Wednesday.  Patient states she is not short of breathe today, she is having neck pain today that’s going to head.\par Grand daughter thinks she may be anxious\par \par Today- here with daughter feels okay \par has lot of pain in neck area/ face is healing well

## 2020-02-03 NOTE — PHYSICAL EXAM
[No Acute Distress] : no acute distress [Well Nourished] : well nourished [Well Developed] : well developed [Well-Appearing] : well-appearing [Normal Sclera/Conjunctiva] : normal sclera/conjunctiva [PERRL] : pupils equal round and reactive to light [EOMI] : extraocular movements intact [Normal Outer Ear/Nose] : the outer ears and nose were normal in appearance [Normal Oropharynx] : the oropharynx was normal [No JVD] : no jugular venous distention [No Lymphadenopathy] : no lymphadenopathy [Supple] : supple [Thyroid Normal, No Nodules] : the thyroid was normal and there were no nodules present [No Respiratory Distress] : no respiratory distress  [No Accessory Muscle Use] : no accessory muscle use [Clear to Auscultation] : lungs were clear to auscultation bilaterally [Normal Rate] : normal rate  [Regular Rhythm] : with a regular rhythm [Normal S1, S2] : normal S1 and S2 [No Murmur] : no murmur heard [No Carotid Bruits] : no carotid bruits [No Abdominal Bruit] : a ~M bruit was not heard ~T in the abdomen [No Varicosities] : no varicosities [Pedal Pulses Present] : the pedal pulses are present [No Edema] : there was no peripheral edema [No Palpable Aorta] : no palpable aorta [No Extremity Clubbing/Cyanosis] : no extremity clubbing/cyanosis [Soft] : abdomen soft [Non Tender] : non-tender [Non-distended] : non-distended [No Masses] : no abdominal mass palpated [No HSM] : no HSM [Normal Bowel Sounds] : normal bowel sounds [Normal Posterior Cervical Nodes] : no posterior cervical lymphadenopathy [Normal Anterior Cervical Nodes] : no anterior cervical lymphadenopathy [No CVA Tenderness] : no CVA  tenderness [No Spinal Tenderness] : no spinal tenderness [No Joint Swelling] : no joint swelling [Grossly Normal Strength/Tone] : grossly normal strength/tone [No Rash] : no rash [Coordination Grossly Intact] : coordination grossly intact [No Focal Deficits] : no focal deficits [Normal Gait] : normal gait [Deep Tendon Reflexes (DTR)] : deep tendon reflexes were 2+ and symmetric [Normal Affect] : the affect was normal [Normal Insight/Judgement] : insight and judgment were intact [de-identified] : muscle spasm

## 2020-02-04 ENCOUNTER — APPOINTMENT (OUTPATIENT)
Dept: FAMILY MEDICINE | Facility: CLINIC | Age: 85
End: 2020-02-04

## 2020-03-09 ENCOUNTER — APPOINTMENT (OUTPATIENT)
Dept: ENDOCRINOLOGY | Facility: CLINIC | Age: 85
End: 2020-03-09

## 2020-03-09 ENCOUNTER — APPOINTMENT (OUTPATIENT)
Dept: FAMILY MEDICINE | Facility: CLINIC | Age: 85
End: 2020-03-09
Payer: MEDICARE

## 2020-03-09 ENCOUNTER — LABORATORY RESULT (OUTPATIENT)
Age: 85
End: 2020-03-09

## 2020-03-09 VITALS — OXYGEN SATURATION: 99 %

## 2020-03-09 VITALS
BODY MASS INDEX: 16.65 KG/M2 | HEIGHT: 56 IN | DIASTOLIC BLOOD PRESSURE: 60 MMHG | SYSTOLIC BLOOD PRESSURE: 122 MMHG | HEART RATE: 70 BPM | WEIGHT: 74 LBS

## 2020-03-09 DIAGNOSIS — R07.9 CHEST PAIN, UNSPECIFIED: ICD-10-CM

## 2020-03-09 PROCEDURE — 99214 OFFICE O/P EST MOD 30 MIN: CPT

## 2020-03-10 NOTE — PLAN
[FreeTextEntry1] : Chronic neck pain: will try Tylenol/ Lidocaine external patch / will add tramadol 50 mg 1/2 tan 3x/day and watch and may increase to 50 mg daily tID \par Chest pain: will try Nitroglycerin transdermal patch / may contact cardio \par Labs drawn in office today.\par advise daughter to get Home care for patient she will qualify for it. PAtients daughter declined

## 2020-03-10 NOTE — PHYSICAL EXAM
[No Acute Distress] : no acute distress [Well Nourished] : well nourished [Well Developed] : well developed [Well-Appearing] : well-appearing [Normal Sclera/Conjunctiva] : normal sclera/conjunctiva [PERRL] : pupils equal round and reactive to light [EOMI] : extraocular movements intact [Normal Outer Ear/Nose] : the outer ears and nose were normal in appearance [Normal Oropharynx] : the oropharynx was normal [No JVD] : no jugular venous distention [No Lymphadenopathy] : no lymphadenopathy [Supple] : supple [Thyroid Normal, No Nodules] : the thyroid was normal and there were no nodules present [No Respiratory Distress] : no respiratory distress  [No Accessory Muscle Use] : no accessory muscle use [Clear to Auscultation] : lungs were clear to auscultation bilaterally [Normal Rate] : normal rate  [Regular Rhythm] : with a regular rhythm [Normal S1, S2] : normal S1 and S2 [No Murmur] : no murmur heard [No Carotid Bruits] : no carotid bruits [No Abdominal Bruit] : a ~M bruit was not heard ~T in the abdomen [No Varicosities] : no varicosities [Pedal Pulses Present] : the pedal pulses are present [No Edema] : there was no peripheral edema [No Palpable Aorta] : no palpable aorta [No Extremity Clubbing/Cyanosis] : no extremity clubbing/cyanosis [Soft] : abdomen soft [Non Tender] : non-tender [Non-distended] : non-distended [No Masses] : no abdominal mass palpated [No HSM] : no HSM [Normal Bowel Sounds] : normal bowel sounds [Normal Posterior Cervical Nodes] : no posterior cervical lymphadenopathy [Normal Anterior Cervical Nodes] : no anterior cervical lymphadenopathy [No CVA Tenderness] : no CVA  tenderness [No Spinal Tenderness] : no spinal tenderness [No Joint Swelling] : no joint swelling [Grossly Normal Strength/Tone] : grossly normal strength/tone [No Rash] : no rash [Coordination Grossly Intact] : coordination grossly intact [No Focal Deficits] : no focal deficits [Normal Gait] : normal gait [Deep Tendon Reflexes (DTR)] : deep tendon reflexes were 2+ and symmetric [Normal Affect] : the affect was normal [Normal Insight/Judgement] : insight and judgment were intact [de-identified] : Hump in the neck

## 2020-03-10 NOTE — HISTORY OF PRESENT ILLNESS
[Family Member] : family member [FreeTextEntry1] : Patient here with daughter for follow up and refills [de-identified] : Daughter states she does not feel well. She states that she still feels heaviness on her chest along with neck pain. She notes that heaviness presents mostly when she walks. Daughter states this is the first time she is hearing this as patient mostly stays in bed and gets up very little .She also states heaviness is spread throughout her chest and lasts a day or two. She has been taking OTC Tylenol but not consistently.  Daughter states Biofreeze helps. No SOB, palpitations, or LE edema reported. Daughter states oxygen levels at home range around 97-99. \par patient doenot like to take lot of meds and looks depressed .\par Daughter has to run everyday home from work to check on her.

## 2020-03-10 NOTE — REVIEW OF SYSTEMS
[Chest Pain] : chest pain [Negative] : Heme/Lymph [Palpitations] : no palpitations [Lower Ext Edema] : no lower extremity edema [Orthopnea] : no orthopnea [Paroxysmal Nocturnal Dyspnea] : no paroxysmal nocturnal dyspnea [Shortness Of Breath] : no shortness of breath [FreeTextEntry9] : chronic neck pain

## 2020-03-10 NOTE — ADDENDUM
[FreeTextEntry1] : I, Estrella Gamez, acted solely as a scribe for Dr. Muro on this date [3/9/2020].\par

## 2020-03-10 NOTE — END OF VISIT
[FreeTextEntry3] : All medical entries made by the Scribe were at my, Dr. Muro's, direction and personally dictated by me on [3/9/2020]. I have reviewed the chart and agree that the record accurately reflects my personal performance of the history, physical exam, assessment and plan. I have also personally directed, reviewed, and agreed with the chart.\par

## 2020-03-17 PROBLEM — Z87.898 HISTORY OF FATIGUE: Status: RESOLVED | Noted: 2019-07-01 | Resolved: 2020-01-01

## 2020-03-17 PROBLEM — R53.83 FATIGUE: Status: ACTIVE | Noted: 2020-01-01

## 2020-06-10 PROBLEM — E03.9 PRIMARY HYPOTHYROIDISM: Status: ACTIVE | Noted: 2018-10-18

## 2020-06-10 NOTE — HISTORY OF PRESENT ILLNESS
[Home] : at home, [unfilled] , at the time of the visit. [Medical Office: ([facility])___] : at the medical office located in  [Verbal consent obtained from patient] : the patient, [unfilled] [Family Member] : family member [FreeTextEntry1] : HTN/ hypothyroidism [de-identified] : patient is pretty much bed bound now,but has no complaints of chest or neck pain. she is comfortable and daughter has taken FML to take care of her

## 2020-06-10 NOTE — PLAN
[FreeTextEntry1] : # meds renwed\par # d/c nitroglycerine patch, daughter states she is not taking it.\par # no aggressive measures and advise patient to keep comfortable

## 2020-07-15 PROBLEM — R54 FRAIL ELDERLY: Status: ACTIVE | Noted: 2020-01-01

## 2020-07-15 NOTE — HISTORY OF PRESENT ILLNESS
[Home] : at home, [unfilled] , at the time of the visit. [Medical Office: ([facility])___] : at the medical office located in  [FreeTextEntry1] : Needs follow up for HHaid  [Verbal consent obtained from patient] : the patient, [unfilled] [de-identified] : Home BP is better and she is eating better then before as per daughter. she is mostly bed bound and she needs helps wilth all her ADLS.\par One time she tried to get up by herself and she bruised her leg and arms.\par denies any chest pain or SOB.leg edema or palpitations

## 2020-07-15 NOTE — PHYSICAL EXAM
[Normal] : no acute distress, well nourished, well developed and well-appearing [de-identified] : has multiple bruises in the leg/ one with scaband its healing

## 2020-07-15 NOTE — PLAN
[FreeTextEntry1] : 89 year old unable to do her ADLS needs assistance at all times.\par will get her Medicaid form filled.\par follow up 3 months\par she doesnot like pain meds \par labs discussed with daughter/ will not pursue for now unless her health changes.

## 2020-11-13 PROBLEM — D64.9 ANEMIA: Status: ACTIVE | Noted: 2018-10-16

## 2020-11-15 NOTE — HISTORY OF PRESENT ILLNESS
[FreeTextEntry1] : pt in office for medication refills. [de-identified] : HERE WITH DAUGHTER FEELS OKAY/ AS PER DAUGHTER SHE IS STAYING HOME FROM THE WORKA ND PATIENT IS DOING MUCH BETTER. SHE IS EATING , DOESNOT WALK MUCH SHE STAYS IN THE BED MOSTLY

## 2020-11-15 NOTE — PLAN
[FreeTextEntry1] : CHF/CKD- STABLE UNDER CONTROL\par MEDS RENEWED\par DECLINED VACCINE\par WILL RETURN IN 6 MONTHS OR TEB VISIT AS NEEDED

## 2020-11-16 PROBLEM — E87.5 SERUM POTASSIUM ELEVATED: Status: ACTIVE | Noted: 2020-01-01

## 2020-11-18 PROBLEM — N18.9 CKD (CHRONIC KIDNEY DISEASE): Status: ACTIVE | Noted: 2020-01-01

## 2020-11-18 PROBLEM — E87.5 HYPERKALEMIA: Status: ACTIVE | Noted: 2018-10-16

## 2020-12-16 PROBLEM — I50.9 CHF (CONGESTIVE HEART FAILURE): Status: ACTIVE | Noted: 2019-07-01

## 2020-12-16 NOTE — PLAN
[FreeTextEntry1] : HTn elevated- add Hydralazine 50 mg tID/ daughter will monitor Bp at home to keep in 130-140 range \par she will inform me\par Refrain for high K diet \par CHF- lasix 20 mg.\par \par follow up 1 month via TEB\par \par CHF/CKD- STABLE UNDER CONTROL\par MEDS RENEWED\par DECLINED VACCINE\par WILL RETURN IN 6 MONTHS OR TEB VISIT AS NEEDED

## 2020-12-16 NOTE — HISTORY OF PRESENT ILLNESS
[FreeTextEntry1] : pt in office for medication refills/ follow up [de-identified] : HERE WITH DAUGHTER FEELS OKAY/ AS PER DAUGHTER SHE IS STAYING HOME FROM THE WORKA ND PATIENT IS DOING MUCH BETTER. SHE IS EATING , DOESNOT WALK MUCH SHE STAYS IN THE BED MOSTLY\par \par 12/2020- feels good \par as per daughter eating better \par needs meds refills

## 2020-12-16 NOTE — DISCUSSION/SUMMARY
[FreeTextEntry1] : received call critical K 6.3 \par over the weekend covering physician Dr. Mahoney had called patient and sent kaxylate but she was not able to connect with patient or daughter. just called daughter again advised to bring Kaxylate and start dose now ,will get home draw plasma K today  and follow up.

## 2021-01-01 ENCOUNTER — INPATIENT (INPATIENT)
Facility: HOSPITAL | Age: 86
LOS: 2 days | Discharge: HOSPICE | DRG: 439 | End: 2021-03-10
Attending: INTERNAL MEDICINE | Admitting: INTERNAL MEDICINE
Payer: MEDICARE

## 2021-01-01 ENCOUNTER — APPOINTMENT (OUTPATIENT)
Dept: FAMILY MEDICINE | Facility: CLINIC | Age: 86
End: 2021-01-01

## 2021-01-01 ENCOUNTER — APPOINTMENT (OUTPATIENT)
Dept: FAMILY MEDICINE | Facility: CLINIC | Age: 86
End: 2021-01-01
Payer: MEDICARE

## 2021-01-01 ENCOUNTER — TRANSCRIPTION ENCOUNTER (OUTPATIENT)
Age: 86
End: 2021-01-01

## 2021-01-01 VITALS
TEMPERATURE: 90 F | OXYGEN SATURATION: 99 % | SYSTOLIC BLOOD PRESSURE: 147 MMHG | HEART RATE: 71 BPM | RESPIRATION RATE: 18 BRPM | DIASTOLIC BLOOD PRESSURE: 67 MMHG

## 2021-01-01 VITALS
SYSTOLIC BLOOD PRESSURE: 181 MMHG | HEIGHT: 62 IN | RESPIRATION RATE: 18 BRPM | WEIGHT: 100.09 LBS | DIASTOLIC BLOOD PRESSURE: 69 MMHG | HEART RATE: 76 BPM | OXYGEN SATURATION: 98 %

## 2021-01-01 DIAGNOSIS — Z98.89 OTHER SPECIFIED POSTPROCEDURAL STATES: Chronic | ICD-10-CM

## 2021-01-01 DIAGNOSIS — Z98.49 CATARACT EXTRACTION STATUS, UNSPECIFIED EYE: Chronic | ICD-10-CM

## 2021-01-01 DIAGNOSIS — G89.29 CERVICALGIA: ICD-10-CM

## 2021-01-01 DIAGNOSIS — I10 ESSENTIAL (PRIMARY) HYPERTENSION: ICD-10-CM

## 2021-01-01 DIAGNOSIS — M54.2 CERVICALGIA: ICD-10-CM

## 2021-01-01 DIAGNOSIS — K85.90 ACUTE PANCREATITIS WITHOUT NECROSIS OR INFECTION, UNSPECIFIED: ICD-10-CM

## 2021-01-01 LAB
ALBUMIN SERPL ELPH-MCNC: 3.4 G/DL — SIGNIFICANT CHANGE UP (ref 3.3–5.2)
ALBUMIN SERPL ELPH-MCNC: 3.6 G/DL — SIGNIFICANT CHANGE UP (ref 3.3–5.2)
ALBUMIN SERPL ELPH-MCNC: 3.6 G/DL — SIGNIFICANT CHANGE UP (ref 3.3–5.2)
ALBUMIN SERPL ELPH-MCNC: 4.2 G/DL — SIGNIFICANT CHANGE UP (ref 3.3–5.2)
ALP SERPL-CCNC: 127 U/L — HIGH (ref 40–120)
ALP SERPL-CCNC: 129 U/L — HIGH (ref 40–120)
ALP SERPL-CCNC: 158 U/L — HIGH (ref 40–120)
ALP SERPL-CCNC: 190 U/L — HIGH (ref 40–120)
ALT FLD-CCNC: 101 U/L — HIGH
ALT FLD-CCNC: 135 U/L — HIGH
ALT FLD-CCNC: 142 U/L — HIGH
ALT FLD-CCNC: 86 U/L — HIGH
ANION GAP SERPL CALC-SCNC: 14 MMOL/L — SIGNIFICANT CHANGE UP (ref 5–17)
ANION GAP SERPL CALC-SCNC: 14 MMOL/L — SIGNIFICANT CHANGE UP (ref 5–17)
ANION GAP SERPL CALC-SCNC: 16 MMOL/L — SIGNIFICANT CHANGE UP (ref 5–17)
ANION GAP SERPL CALC-SCNC: 17 MMOL/L — SIGNIFICANT CHANGE UP (ref 5–17)
ANION GAP SERPL CALC-SCNC: 17 MMOL/L — SIGNIFICANT CHANGE UP (ref 5–17)
ANISOCYTOSIS BLD QL: SIGNIFICANT CHANGE UP
APTT BLD: 45 SEC — HIGH (ref 27.5–35.5)
AST SERPL-CCNC: 107 U/L — HIGH
AST SERPL-CCNC: 186 U/L — HIGH
AST SERPL-CCNC: 368 U/L — HIGH
AST SERPL-CCNC: 84 U/L — HIGH
BASOPHILS # BLD AUTO: 0.03 K/UL — SIGNIFICANT CHANGE UP (ref 0–0.2)
BASOPHILS NFR BLD AUTO: 0.4 % — SIGNIFICANT CHANGE UP (ref 0–2)
BILIRUB DIRECT SERPL-MCNC: 0.2 MG/DL — SIGNIFICANT CHANGE UP (ref 0–0.3)
BILIRUB INDIRECT FLD-MCNC: 0.3 MG/DL — SIGNIFICANT CHANGE UP (ref 0.2–1)
BILIRUB SERPL-MCNC: 0.3 MG/DL — LOW (ref 0.4–2)
BILIRUB SERPL-MCNC: 0.4 MG/DL — SIGNIFICANT CHANGE UP (ref 0.4–2)
BILIRUB SERPL-MCNC: 0.4 MG/DL — SIGNIFICANT CHANGE UP (ref 0.4–2)
BILIRUB SERPL-MCNC: 0.8 MG/DL — SIGNIFICANT CHANGE UP (ref 0.4–2)
BLD GP AB SCN SERPL QL: SIGNIFICANT CHANGE UP
BUN SERPL-MCNC: 68 MG/DL — HIGH (ref 8–20)
BUN SERPL-MCNC: 69 MG/DL — HIGH (ref 8–20)
BUN SERPL-MCNC: 71 MG/DL — HIGH (ref 8–20)
BURR CELLS BLD QL SMEAR: PRESENT — SIGNIFICANT CHANGE UP
CALCIUM SERPL-MCNC: 8.2 MG/DL — LOW (ref 8.6–10.2)
CALCIUM SERPL-MCNC: 8.4 MG/DL — LOW (ref 8.6–10.2)
CALCIUM SERPL-MCNC: 8.6 MG/DL — SIGNIFICANT CHANGE UP (ref 8.6–10.2)
CALCIUM SERPL-MCNC: 8.7 MG/DL — SIGNIFICANT CHANGE UP (ref 8.6–10.2)
CALCIUM SERPL-MCNC: 9.5 MG/DL — SIGNIFICANT CHANGE UP (ref 8.6–10.2)
CHLORIDE SERPL-SCNC: 108 MMOL/L — HIGH (ref 98–107)
CHLORIDE SERPL-SCNC: 108 MMOL/L — HIGH (ref 98–107)
CHLORIDE SERPL-SCNC: 109 MMOL/L — HIGH (ref 98–107)
CHLORIDE SERPL-SCNC: 109 MMOL/L — HIGH (ref 98–107)
CHLORIDE SERPL-SCNC: 110 MMOL/L — HIGH (ref 98–107)
CO2 SERPL-SCNC: 18 MMOL/L — LOW (ref 22–29)
CO2 SERPL-SCNC: 20 MMOL/L — LOW (ref 22–29)
CO2 SERPL-SCNC: 20 MMOL/L — LOW (ref 22–29)
CO2 SERPL-SCNC: 21 MMOL/L — LOW (ref 22–29)
CO2 SERPL-SCNC: 21 MMOL/L — LOW (ref 22–29)
CREAT SERPL-MCNC: 3.61 MG/DL — HIGH (ref 0.5–1.3)
CREAT SERPL-MCNC: 3.64 MG/DL — HIGH (ref 0.5–1.3)
CREAT SERPL-MCNC: 3.69 MG/DL — HIGH (ref 0.5–1.3)
CREAT SERPL-MCNC: 3.77 MG/DL — HIGH (ref 0.5–1.3)
CREAT SERPL-MCNC: 3.98 MG/DL — HIGH (ref 0.5–1.3)
CULTURE RESULTS: SIGNIFICANT CHANGE UP
CULTURE RESULTS: SIGNIFICANT CHANGE UP
ELLIPTOCYTES BLD QL SMEAR: SLIGHT — SIGNIFICANT CHANGE UP
EOSINOPHIL # BLD AUTO: 0.02 K/UL — SIGNIFICANT CHANGE UP (ref 0–0.5)
EOSINOPHIL NFR BLD AUTO: 0.2 % — SIGNIFICANT CHANGE UP (ref 0–6)
FOLATE SERPL-MCNC: 8.8 NG/ML — SIGNIFICANT CHANGE UP
GLUCOSE SERPL-MCNC: 100 MG/DL — HIGH (ref 70–99)
GLUCOSE SERPL-MCNC: 75 MG/DL — SIGNIFICANT CHANGE UP (ref 70–99)
GLUCOSE SERPL-MCNC: 79 MG/DL — SIGNIFICANT CHANGE UP (ref 70–99)
GLUCOSE SERPL-MCNC: 81 MG/DL — SIGNIFICANT CHANGE UP (ref 70–99)
GLUCOSE SERPL-MCNC: 87 MG/DL — SIGNIFICANT CHANGE UP (ref 70–99)
HCT VFR BLD CALC: 20.3 % — CRITICAL LOW (ref 34.5–45)
HCT VFR BLD CALC: 22.2 % — LOW (ref 34.5–45)
HCT VFR BLD CALC: 24.5 % — LOW (ref 34.5–45)
HCT VFR BLD CALC: 28.3 % — LOW (ref 34.5–45)
HCT VFR BLD CALC: 28.7 % — LOW (ref 34.5–45)
HGB BLD-MCNC: 6.5 G/DL — CRITICAL LOW (ref 11.5–15.5)
HGB BLD-MCNC: 6.9 G/DL — CRITICAL LOW (ref 11.5–15.5)
HGB BLD-MCNC: 7.8 G/DL — LOW (ref 11.5–15.5)
HGB BLD-MCNC: 9.2 G/DL — LOW (ref 11.5–15.5)
HGB BLD-MCNC: 9.3 G/DL — LOW (ref 11.5–15.5)
HYPOCHROMIA BLD QL: SLIGHT — SIGNIFICANT CHANGE UP
IMM GRANULOCYTES NFR BLD AUTO: 0.2 % — SIGNIFICANT CHANGE UP (ref 0–1.5)
INR BLD: 0.89 RATIO — SIGNIFICANT CHANGE UP (ref 0.88–1.16)
LACTATE BLDV-MCNC: 1 MMOL/L — SIGNIFICANT CHANGE UP (ref 0.5–2)
LYMPHOCYTES # BLD AUTO: 1.09 K/UL — SIGNIFICANT CHANGE UP (ref 1–3.3)
LYMPHOCYTES # BLD AUTO: 13.4 % — SIGNIFICANT CHANGE UP (ref 13–44)
MACROCYTES BLD QL: SIGNIFICANT CHANGE UP
MAGNESIUM SERPL-MCNC: 2.7 MG/DL — HIGH (ref 1.8–2.6)
MANUAL SMEAR VERIFICATION: SIGNIFICANT CHANGE UP
MCHC RBC-ENTMCNC: 31.1 GM/DL — LOW (ref 32–36)
MCHC RBC-ENTMCNC: 31.8 GM/DL — LOW (ref 32–36)
MCHC RBC-ENTMCNC: 32 GM/DL — SIGNIFICANT CHANGE UP (ref 32–36)
MCHC RBC-ENTMCNC: 32.4 GM/DL — SIGNIFICANT CHANGE UP (ref 32–36)
MCHC RBC-ENTMCNC: 32.5 GM/DL — SIGNIFICANT CHANGE UP (ref 32–36)
MCHC RBC-ENTMCNC: 33.7 PG — SIGNIFICANT CHANGE UP (ref 27–34)
MCHC RBC-ENTMCNC: 33.7 PG — SIGNIFICANT CHANGE UP (ref 27–34)
MCHC RBC-ENTMCNC: 35 PG — HIGH (ref 27–34)
MCHC RBC-ENTMCNC: 35.6 PG — HIGH (ref 27–34)
MCHC RBC-ENTMCNC: 36.1 PG — HIGH (ref 27–34)
MCV RBC AUTO: 103.7 FL — HIGH (ref 80–100)
MCV RBC AUTO: 104 FL — HIGH (ref 80–100)
MCV RBC AUTO: 111.9 FL — HIGH (ref 80–100)
MCV RBC AUTO: 112.7 FL — HIGH (ref 80–100)
MCV RBC AUTO: 112.8 FL — HIGH (ref 80–100)
MONOCYTES # BLD AUTO: 0.79 K/UL — SIGNIFICANT CHANGE UP (ref 0–0.9)
MONOCYTES NFR BLD AUTO: 9.7 % — SIGNIFICANT CHANGE UP (ref 2–14)
NEUTROPHILS # BLD AUTO: 6.18 K/UL — SIGNIFICANT CHANGE UP (ref 1.8–7.4)
NEUTROPHILS NFR BLD AUTO: 76.1 % — SIGNIFICANT CHANGE UP (ref 43–77)
NRBC # BLD: 2 /100 WBCS — HIGH (ref 0–0)
OVALOCYTES BLD QL SMEAR: SLIGHT — SIGNIFICANT CHANGE UP
PHOSPHATE SERPL-MCNC: 6.4 MG/DL — HIGH (ref 2.4–4.7)
PLAT MORPH BLD: NORMAL — SIGNIFICANT CHANGE UP
PLATELET # BLD AUTO: 118 K/UL — LOW (ref 150–400)
PLATELET # BLD AUTO: 127 K/UL — LOW (ref 150–400)
PLATELET # BLD AUTO: 167 K/UL — SIGNIFICANT CHANGE UP (ref 150–400)
PLATELET # BLD AUTO: 86 K/UL — LOW (ref 150–400)
PLATELET # BLD AUTO: 87 K/UL — LOW (ref 150–400)
POIKILOCYTOSIS BLD QL AUTO: SLIGHT — SIGNIFICANT CHANGE UP
POLYCHROMASIA BLD QL SMEAR: SLIGHT — SIGNIFICANT CHANGE UP
POTASSIUM SERPL-MCNC: 5.2 MMOL/L — SIGNIFICANT CHANGE UP (ref 3.5–5.3)
POTASSIUM SERPL-MCNC: 5.3 MMOL/L — SIGNIFICANT CHANGE UP (ref 3.5–5.3)
POTASSIUM SERPL-MCNC: 5.3 MMOL/L — SIGNIFICANT CHANGE UP (ref 3.5–5.3)
POTASSIUM SERPL-MCNC: 5.5 MMOL/L — HIGH (ref 3.5–5.3)
POTASSIUM SERPL-MCNC: 5.7 MMOL/L — HIGH (ref 3.5–5.3)
POTASSIUM SERPL-SCNC: 5.2 MMOL/L — SIGNIFICANT CHANGE UP (ref 3.5–5.3)
POTASSIUM SERPL-SCNC: 5.3 MMOL/L — SIGNIFICANT CHANGE UP (ref 3.5–5.3)
POTASSIUM SERPL-SCNC: 5.3 MMOL/L — SIGNIFICANT CHANGE UP (ref 3.5–5.3)
POTASSIUM SERPL-SCNC: 5.5 MMOL/L — HIGH (ref 3.5–5.3)
POTASSIUM SERPL-SCNC: 5.7 MMOL/L — HIGH (ref 3.5–5.3)
PROT SERPL-MCNC: 6.2 G/DL — LOW (ref 6.6–8.7)
PROT SERPL-MCNC: 6.5 G/DL — LOW (ref 6.6–8.7)
PROT SERPL-MCNC: 6.6 G/DL — SIGNIFICANT CHANGE UP (ref 6.6–8.7)
PROT SERPL-MCNC: 7.5 G/DL — SIGNIFICANT CHANGE UP (ref 6.6–8.7)
PROTHROM AB SERPL-ACNC: 10.4 SEC — LOW (ref 10.6–13.6)
RBC # BLD: 1.8 M/UL — LOW (ref 3.8–5.2)
RBC # BLD: 1.97 M/UL — LOW (ref 3.8–5.2)
RBC # BLD: 2.19 M/UL — LOW (ref 3.8–5.2)
RBC # BLD: 2.73 M/UL — LOW (ref 3.8–5.2)
RBC # BLD: 2.76 M/UL — LOW (ref 3.8–5.2)
RBC # FLD: 15.8 % — HIGH (ref 10.3–14.5)
RBC # FLD: 15.9 % — HIGH (ref 10.3–14.5)
RBC # FLD: 15.9 % — HIGH (ref 10.3–14.5)
RBC # FLD: 18.6 % — HIGH (ref 10.3–14.5)
RBC # FLD: 19.3 % — HIGH (ref 10.3–14.5)
RBC BLD AUTO: ABNORMAL
SARS-COV-2 IGG SERPL QL IA: NEGATIVE — SIGNIFICANT CHANGE UP
SARS-COV-2 IGM SERPL IA-ACNC: 0.08 INDEX — SIGNIFICANT CHANGE UP
SCHISTOCYTES BLD QL AUTO: SLIGHT — SIGNIFICANT CHANGE UP
SODIUM SERPL-SCNC: 143 MMOL/L — SIGNIFICANT CHANGE UP (ref 135–145)
SODIUM SERPL-SCNC: 144 MMOL/L — SIGNIFICANT CHANGE UP (ref 135–145)
SODIUM SERPL-SCNC: 144 MMOL/L — SIGNIFICANT CHANGE UP (ref 135–145)
SODIUM SERPL-SCNC: 145 MMOL/L — SIGNIFICANT CHANGE UP (ref 135–145)
SODIUM SERPL-SCNC: 146 MMOL/L — HIGH (ref 135–145)
SPECIMEN SOURCE: SIGNIFICANT CHANGE UP
SPECIMEN SOURCE: SIGNIFICANT CHANGE UP
TARGETS BLD QL SMEAR: SLIGHT — SIGNIFICANT CHANGE UP
VIT B12 SERPL-MCNC: 850 PG/ML — SIGNIFICANT CHANGE UP (ref 232–1245)
WBC # BLD: 1.6 K/UL — LOW (ref 3.8–10.5)
WBC # BLD: 5.33 K/UL — SIGNIFICANT CHANGE UP (ref 3.8–10.5)
WBC # BLD: 5.66 K/UL — SIGNIFICANT CHANGE UP (ref 3.8–10.5)
WBC # BLD: 6.82 K/UL — SIGNIFICANT CHANGE UP (ref 3.8–10.5)
WBC # BLD: 8.13 K/UL — SIGNIFICANT CHANGE UP (ref 3.8–10.5)
WBC # FLD AUTO: 1.6 K/UL — LOW (ref 3.8–10.5)
WBC # FLD AUTO: 5.33 K/UL — SIGNIFICANT CHANGE UP (ref 3.8–10.5)
WBC # FLD AUTO: 5.66 K/UL — SIGNIFICANT CHANGE UP (ref 3.8–10.5)
WBC # FLD AUTO: 6.82 K/UL — SIGNIFICANT CHANGE UP (ref 3.8–10.5)
WBC # FLD AUTO: 8.13 K/UL — SIGNIFICANT CHANGE UP (ref 3.8–10.5)

## 2021-01-01 PROCEDURE — 85025 COMPLETE CBC W/AUTO DIFF WBC: CPT

## 2021-01-01 PROCEDURE — 99233 SBSQ HOSP IP/OBS HIGH 50: CPT

## 2021-01-01 PROCEDURE — 82962 GLUCOSE BLOOD TEST: CPT

## 2021-01-01 PROCEDURE — 87631 RESP VIRUS 3-5 TARGETS: CPT

## 2021-01-01 PROCEDURE — 36415 COLL VENOUS BLD VENIPUNCTURE: CPT

## 2021-01-01 PROCEDURE — 96374 THER/PROPH/DIAG INJ IV PUSH: CPT

## 2021-01-01 PROCEDURE — 71045 X-RAY EXAM CHEST 1 VIEW: CPT | Mod: 26

## 2021-01-01 PROCEDURE — U0005: CPT

## 2021-01-01 PROCEDURE — 99223 1ST HOSP IP/OBS HIGH 75: CPT

## 2021-01-01 PROCEDURE — 74176 CT ABD & PELVIS W/O CONTRAST: CPT | Mod: 26,MD

## 2021-01-01 PROCEDURE — 86901 BLOOD TYPING SEROLOGIC RH(D): CPT

## 2021-01-01 PROCEDURE — 36430 TRANSFUSION BLD/BLD COMPNT: CPT

## 2021-01-01 PROCEDURE — 99285 EMERGENCY DEPT VISIT HI MDM: CPT | Mod: 25

## 2021-01-01 PROCEDURE — 84100 ASSAY OF PHOSPHORUS: CPT

## 2021-01-01 PROCEDURE — 86900 BLOOD TYPING SEROLOGIC ABO: CPT

## 2021-01-01 PROCEDURE — 93005 ELECTROCARDIOGRAM TRACING: CPT

## 2021-01-01 PROCEDURE — 99239 HOSP IP/OBS DSCHRG MGMT >30: CPT

## 2021-01-01 PROCEDURE — 85730 THROMBOPLASTIN TIME PARTIAL: CPT

## 2021-01-01 PROCEDURE — 80048 BASIC METABOLIC PNL TOTAL CA: CPT

## 2021-01-01 PROCEDURE — 83605 ASSAY OF LACTIC ACID: CPT

## 2021-01-01 PROCEDURE — 86923 COMPATIBILITY TEST ELECTRIC: CPT

## 2021-01-01 PROCEDURE — 76705 ECHO EXAM OF ABDOMEN: CPT | Mod: 26,RT

## 2021-01-01 PROCEDURE — 87040 BLOOD CULTURE FOR BACTERIA: CPT

## 2021-01-01 PROCEDURE — 80053 COMPREHEN METABOLIC PANEL: CPT

## 2021-01-01 PROCEDURE — 74176 CT ABD & PELVIS W/O CONTRAST: CPT

## 2021-01-01 PROCEDURE — 83690 ASSAY OF LIPASE: CPT

## 2021-01-01 PROCEDURE — 71045 X-RAY EXAM CHEST 1 VIEW: CPT

## 2021-01-01 PROCEDURE — 80076 HEPATIC FUNCTION PANEL: CPT

## 2021-01-01 PROCEDURE — 84478 ASSAY OF TRIGLYCERIDES: CPT

## 2021-01-01 PROCEDURE — 99222 1ST HOSP IP/OBS MODERATE 55: CPT

## 2021-01-01 PROCEDURE — 82746 ASSAY OF FOLIC ACID SERUM: CPT

## 2021-01-01 PROCEDURE — 96375 TX/PRO/DX INJ NEW DRUG ADDON: CPT

## 2021-01-01 PROCEDURE — 86850 RBC ANTIBODY SCREEN: CPT

## 2021-01-01 PROCEDURE — 99213 OFFICE O/P EST LOW 20 MIN: CPT | Mod: 95

## 2021-01-01 PROCEDURE — P9016: CPT

## 2021-01-01 PROCEDURE — 99285 EMERGENCY DEPT VISIT HI MDM: CPT | Mod: CS

## 2021-01-01 PROCEDURE — 85610 PROTHROMBIN TIME: CPT

## 2021-01-01 PROCEDURE — 76705 ECHO EXAM OF ABDOMEN: CPT

## 2021-01-01 PROCEDURE — 83735 ASSAY OF MAGNESIUM: CPT

## 2021-01-01 PROCEDURE — U0003: CPT

## 2021-01-01 PROCEDURE — 93010 ELECTROCARDIOGRAM REPORT: CPT

## 2021-01-01 PROCEDURE — 86769 SARS-COV-2 COVID-19 ANTIBODY: CPT

## 2021-01-01 PROCEDURE — 99232 SBSQ HOSP IP/OBS MODERATE 35: CPT

## 2021-01-01 PROCEDURE — 99497 ADVNCD CARE PLAN 30 MIN: CPT | Mod: 25

## 2021-01-01 PROCEDURE — 85027 COMPLETE CBC AUTOMATED: CPT

## 2021-01-01 PROCEDURE — 82607 VITAMIN B-12: CPT

## 2021-01-01 RX ORDER — ONDANSETRON 8 MG/1
4 TABLET, FILM COATED ORAL EVERY 6 HOURS
Refills: 0 | Status: DISCONTINUED | OUTPATIENT
Start: 2021-01-01 | End: 2021-01-01

## 2021-01-01 RX ORDER — CIPROFLOXACIN LACTATE 400MG/40ML
200 VIAL (ML) INTRAVENOUS DAILY
Refills: 0 | Status: DISCONTINUED | OUTPATIENT
Start: 2021-01-01 | End: 2021-01-01

## 2021-01-01 RX ORDER — POLYETHYLENE GLYCOL 3350 17 G/17G
17 POWDER, FOR SOLUTION ORAL AT BEDTIME
Refills: 0 | Status: DISCONTINUED | OUTPATIENT
Start: 2021-01-01 | End: 2021-01-01

## 2021-01-01 RX ORDER — MORPHINE SULFATE 50 MG/1
5 CAPSULE, EXTENDED RELEASE ORAL EVERY 4 HOURS
Refills: 0 | Status: DISCONTINUED | OUTPATIENT
Start: 2021-01-01 | End: 2021-01-01

## 2021-01-01 RX ORDER — FERROUS SULFATE 325(65) MG
1 TABLET ORAL
Qty: 0 | Refills: 0 | DISCHARGE

## 2021-01-01 RX ORDER — ACETAMINOPHEN 500 MG
650 TABLET ORAL EVERY 8 HOURS
Refills: 0 | Status: DISCONTINUED | OUTPATIENT
Start: 2021-01-01 | End: 2021-01-01

## 2021-01-01 RX ORDER — HYDRALAZINE HCL 50 MG
1 TABLET ORAL
Qty: 0 | Refills: 0 | DISCHARGE

## 2021-01-01 RX ORDER — SODIUM CHLORIDE 9 MG/ML
1000 INJECTION, SOLUTION INTRAVENOUS
Refills: 0 | Status: DISCONTINUED | OUTPATIENT
Start: 2021-01-01 | End: 2021-01-01

## 2021-01-01 RX ORDER — HYDRALAZINE HCL 50 MG
50 TABLET ORAL THREE TIMES A DAY
Refills: 0 | Status: DISCONTINUED | OUTPATIENT
Start: 2021-01-01 | End: 2021-01-01

## 2021-01-01 RX ORDER — CIPROFLOXACIN LACTATE 400MG/40ML
400 VIAL (ML) INTRAVENOUS ONCE
Refills: 0 | Status: COMPLETED | OUTPATIENT
Start: 2021-01-01 | End: 2021-01-01

## 2021-01-01 RX ORDER — MORPHINE SULFATE 50 MG/1
0.5 CAPSULE, EXTENDED RELEASE ORAL EVERY 4 HOURS
Refills: 0 | Status: DISCONTINUED | OUTPATIENT
Start: 2021-01-01 | End: 2021-01-01

## 2021-01-01 RX ORDER — LEVOTHYROXINE SODIUM 125 MCG
1 TABLET ORAL
Qty: 0 | Refills: 0 | DISCHARGE

## 2021-01-01 RX ORDER — LEVOTHYROXINE SODIUM 125 MCG
12.5 TABLET ORAL AT BEDTIME
Refills: 0 | Status: DISCONTINUED | OUTPATIENT
Start: 2021-01-01 | End: 2021-01-01

## 2021-01-01 RX ORDER — ACETAMINOPHEN 500 MG
750 TABLET ORAL ONCE
Refills: 0 | Status: COMPLETED | OUTPATIENT
Start: 2021-01-01 | End: 2021-01-01

## 2021-01-01 RX ORDER — SODIUM CHLORIDE 9 MG/ML
1550 INJECTION INTRAMUSCULAR; INTRAVENOUS; SUBCUTANEOUS ONCE
Refills: 0 | Status: DISCONTINUED | OUTPATIENT
Start: 2021-01-01 | End: 2021-01-01

## 2021-01-01 RX ORDER — INFLUENZA VIRUS VACCINE 15; 15; 15; 15 UG/.5ML; UG/.5ML; UG/.5ML; UG/.5ML
0.5 SUSPENSION INTRAMUSCULAR ONCE
Refills: 0 | Status: DISCONTINUED | OUTPATIENT
Start: 2021-01-01 | End: 2021-01-01

## 2021-01-01 RX ORDER — ACETAMINOPHEN 500 MG
650 TABLET ORAL ONCE
Refills: 0 | Status: COMPLETED | OUTPATIENT
Start: 2021-01-01 | End: 2021-01-01

## 2021-01-01 RX ORDER — HEPARIN SODIUM 5000 [USP'U]/ML
5000 INJECTION INTRAVENOUS; SUBCUTANEOUS EVERY 12 HOURS
Refills: 0 | Status: DISCONTINUED | OUTPATIENT
Start: 2021-01-01 | End: 2021-01-01

## 2021-01-01 RX ORDER — CHOLECALCIFEROL (VITAMIN D3) 125 MCG
1 CAPSULE ORAL
Qty: 0 | Refills: 0 | DISCHARGE

## 2021-01-01 RX ORDER — METRONIDAZOLE 500 MG
500 TABLET ORAL EVERY 8 HOURS
Refills: 0 | Status: DISCONTINUED | OUTPATIENT
Start: 2021-01-01 | End: 2021-01-01

## 2021-01-01 RX ORDER — ONDANSETRON 8 MG/1
4 TABLET, FILM COATED ORAL ONCE
Refills: 0 | Status: COMPLETED | OUTPATIENT
Start: 2021-01-01 | End: 2021-01-01

## 2021-01-01 RX ORDER — PANTOPRAZOLE SODIUM 20 MG/1
40 TABLET, DELAYED RELEASE ORAL DAILY
Refills: 0 | Status: DISCONTINUED | OUTPATIENT
Start: 2021-01-01 | End: 2021-01-01

## 2021-01-01 RX ORDER — MORPHINE SULFATE 50 MG/1
2 CAPSULE, EXTENDED RELEASE ORAL ONCE
Refills: 0 | Status: DISCONTINUED | OUTPATIENT
Start: 2021-01-01 | End: 2021-01-01

## 2021-01-01 RX ORDER — LIDOCAINE 5% 700 MG/1
5 PATCH TOPICAL
Qty: 30 | Refills: 0 | Status: ACTIVE | COMMUNITY
Start: 2019-12-20 | End: 1900-01-01

## 2021-01-01 RX ORDER — BROMPHENIRAMIN/PSEUDOEPHEDRINE 1-15MG/5ML
LIQUID (ML) ORAL
Qty: 3 | Refills: 2 | Status: ACTIVE | COMMUNITY
Start: 2021-01-01 | End: 1900-01-01

## 2021-01-01 RX ORDER — METRONIDAZOLE 500 MG
500 TABLET ORAL ONCE
Refills: 0 | Status: COMPLETED | OUTPATIENT
Start: 2021-01-01 | End: 2021-01-01

## 2021-01-01 RX ORDER — LIDOCAINE 4 G/100G
1 CREAM TOPICAL EVERY 24 HOURS
Refills: 0 | Status: DISCONTINUED | OUTPATIENT
Start: 2021-01-01 | End: 2021-01-01

## 2021-01-01 RX ORDER — CIPROFLOXACIN LACTATE 400MG/40ML
200 VIAL (ML) INTRAVENOUS EVERY 12 HOURS
Refills: 0 | Status: DISCONTINUED | OUTPATIENT
Start: 2021-01-01 | End: 2021-01-01

## 2021-01-01 RX ADMIN — Medication 100 MILLIGRAM(S): at 13:58

## 2021-01-01 RX ADMIN — PANTOPRAZOLE SODIUM 40 MILLIGRAM(S): 20 TABLET, DELAYED RELEASE ORAL at 10:45

## 2021-01-01 RX ADMIN — Medication 100 MILLIGRAM(S): at 12:00

## 2021-01-01 RX ADMIN — Medication 100 MILLIGRAM(S): at 22:21

## 2021-01-01 RX ADMIN — Medication 650 MILLIGRAM(S): at 22:22

## 2021-01-01 RX ADMIN — PANTOPRAZOLE SODIUM 40 MILLIGRAM(S): 20 TABLET, DELAYED RELEASE ORAL at 21:23

## 2021-01-01 RX ADMIN — Medication 650 MILLIGRAM(S): at 13:59

## 2021-01-01 RX ADMIN — HEPARIN SODIUM 5000 UNIT(S): 5000 INJECTION INTRAVENOUS; SUBCUTANEOUS at 04:54

## 2021-01-01 RX ADMIN — MORPHINE SULFATE 0.5 MILLIGRAM(S): 50 CAPSULE, EXTENDED RELEASE ORAL at 01:01

## 2021-01-01 RX ADMIN — Medication 100 MILLIGRAM(S): at 10:45

## 2021-01-01 RX ADMIN — Medication 12.5 MICROGRAM(S): at 22:59

## 2021-01-01 RX ADMIN — Medication 100 MILLIGRAM(S): at 05:39

## 2021-01-01 RX ADMIN — MORPHINE SULFATE 0.5 MILLIGRAM(S): 50 CAPSULE, EXTENDED RELEASE ORAL at 10:45

## 2021-01-01 RX ADMIN — MORPHINE SULFATE 2 MILLIGRAM(S): 50 CAPSULE, EXTENDED RELEASE ORAL at 13:30

## 2021-01-01 RX ADMIN — LIDOCAINE 1 PATCH: 4 CREAM TOPICAL at 07:34

## 2021-01-01 RX ADMIN — Medication 100 MILLIGRAM(S): at 22:41

## 2021-01-01 RX ADMIN — Medication 650 MILLIGRAM(S): at 04:54

## 2021-01-01 RX ADMIN — Medication 12.5 MICROGRAM(S): at 21:23

## 2021-01-01 RX ADMIN — Medication 100 MILLIGRAM(S): at 04:54

## 2021-01-01 RX ADMIN — Medication 100 MILLIGRAM(S): at 05:12

## 2021-01-01 RX ADMIN — SODIUM CHLORIDE 75 MILLILITER(S): 9 INJECTION, SOLUTION INTRAVENOUS at 13:07

## 2021-01-01 RX ADMIN — MORPHINE SULFATE 0.5 MILLIGRAM(S): 50 CAPSULE, EXTENDED RELEASE ORAL at 11:15

## 2021-01-01 RX ADMIN — MORPHINE SULFATE 0.5 MILLIGRAM(S): 50 CAPSULE, EXTENDED RELEASE ORAL at 00:31

## 2021-01-01 RX ADMIN — Medication 100 MILLIGRAM(S): at 13:07

## 2021-01-01 RX ADMIN — ONDANSETRON 4 MILLIGRAM(S): 8 TABLET, FILM COATED ORAL at 10:35

## 2021-01-01 RX ADMIN — Medication 650 MILLIGRAM(S): at 03:43

## 2021-01-01 RX ADMIN — PANTOPRAZOLE SODIUM 40 MILLIGRAM(S): 20 TABLET, DELAYED RELEASE ORAL at 11:59

## 2021-01-01 RX ADMIN — HEPARIN SODIUM 5000 UNIT(S): 5000 INJECTION INTRAVENOUS; SUBCUTANEOUS at 05:40

## 2021-01-01 RX ADMIN — SODIUM CHLORIDE 100 MILLILITER(S): 9 INJECTION, SOLUTION INTRAVENOUS at 21:24

## 2021-01-01 RX ADMIN — Medication 50 MILLIGRAM(S): at 10:45

## 2021-01-01 RX ADMIN — HEPARIN SODIUM 5000 UNIT(S): 5000 INJECTION INTRAVENOUS; SUBCUTANEOUS at 18:16

## 2021-01-01 RX ADMIN — Medication 50 MILLIGRAM(S): at 21:23

## 2021-01-01 RX ADMIN — Medication 50 MILLIGRAM(S): at 05:40

## 2021-01-01 RX ADMIN — LIDOCAINE 1 PATCH: 4 CREAM TOPICAL at 22:37

## 2021-01-01 RX ADMIN — Medication 12.5 MICROGRAM(S): at 22:44

## 2021-01-01 RX ADMIN — HEPARIN SODIUM 5000 UNIT(S): 5000 INJECTION INTRAVENOUS; SUBCUTANEOUS at 18:36

## 2021-01-01 RX ADMIN — LIDOCAINE 1 PATCH: 4 CREAM TOPICAL at 22:25

## 2021-01-01 RX ADMIN — Medication 200 MILLIGRAM(S): at 13:41

## 2021-01-01 RX ADMIN — SODIUM CHLORIDE 75 MILLILITER(S): 9 INJECTION, SOLUTION INTRAVENOUS at 16:45

## 2021-01-01 RX ADMIN — POLYETHYLENE GLYCOL 3350 17 GRAM(S): 17 POWDER, FOR SOLUTION ORAL at 23:52

## 2021-01-01 RX ADMIN — HEPARIN SODIUM 5000 UNIT(S): 5000 INJECTION INTRAVENOUS; SUBCUTANEOUS at 05:13

## 2021-01-01 RX ADMIN — Medication 650 MILLIGRAM(S): at 02:43

## 2021-01-01 RX ADMIN — Medication 650 MILLIGRAM(S): at 14:00

## 2021-01-01 RX ADMIN — Medication 100 MILLIGRAM(S): at 18:44

## 2021-01-01 RX ADMIN — MORPHINE SULFATE 2 MILLIGRAM(S): 50 CAPSULE, EXTENDED RELEASE ORAL at 10:35

## 2021-01-01 RX ADMIN — LIDOCAINE 1 PATCH: 4 CREAM TOPICAL at 09:57

## 2021-01-01 RX ADMIN — MORPHINE SULFATE 2 MILLIGRAM(S): 50 CAPSULE, EXTENDED RELEASE ORAL at 14:57

## 2021-01-01 RX ADMIN — LIDOCAINE 1 PATCH: 4 CREAM TOPICAL at 10:34

## 2021-01-01 RX ADMIN — Medication 300 MILLIGRAM(S): at 18:14

## 2021-01-20 PROBLEM — I10 BENIGN HYPERTENSION: Status: ACTIVE | Noted: 2018-09-17

## 2021-01-20 PROBLEM — M54.2 CHRONIC NECK PAIN: Status: ACTIVE | Noted: 2019-12-20

## 2021-01-20 NOTE — PLAN
[FreeTextEntry1] : arthritis- will send Lidocaine patch/ may use heat/ tylenola nd bengay\par HTN- better control\par  follow up 3 months

## 2021-01-20 NOTE — HISTORY OF PRESENT ILLNESS
[Home] : at home, [unfilled] , at the time of the visit. [Medical Office: ([facility])___] : at the medical office located in  [Verbal consent obtained from patient] : the patient, [unfilled] [FreeTextEntry1] : follow up HTN/neck pain [de-identified] : /76/ lying in bed, looks comfortable\par severe neck pain last night \par she sat longer then usual/ she used bengay and tylenol/ she is better \par she doesnot like pain meds and her lidocaine patch was

## 2021-03-07 NOTE — ED PROVIDER NOTE - CARE PLAN
Principal Discharge DX:	Acute pancreatitis, unspecified complication status, unspecified pancreatitis type  Secondary Diagnosis:	Anemia, unspecified type  Secondary Diagnosis:	Acute renal failure superimposed on chronic kidney disease, unspecified CKD stage, unspecified acute renal failure type

## 2021-03-07 NOTE — PATIENT PROFILE ADULT - ARE SIGNIFICANT INDICATORS COMPLETE.
Subjective    Chief Complaint   Patient presents with   • Gynecologic Exam     AE/spotting      History of Present Illness    Lay Schaffer is a 60 y.o. female who presents for annual exam.  Non-smoker.  Mammogram being scheduled.  DEXA scan showed osteopenia last year.  Last colonoscopy was 10 years ago and patient has had some recent rectal bleeding.  Has been referred back to immediately to her gastroenterologist for colonoscopy.  Hemoccult was positive today.  Patient worried because she has had some vaginal bleeding recently especially after intercourse.  Previous vaginal hysterectomy for descensus and history endometriosis.    Obstetric History:  OB History    No obstetric history on file.        Menstrual History:     No LMP recorded. Patient is postmenopausal.       Past Medical History:   Diagnosis Date   • Anxiety    • Depression      Family History   Problem Relation Age of Onset   • Alcohol abuse Father      Social History     Tobacco Use   Smoking Status Former Smoker         The following portions of the patient's history were reviewed and updated as appropriate: allergies, current medications, past family history, past medical history, past social history, past surgical history and problem list.    Review of Systems   Constitutional: Negative.  Negative for fever and unexpected weight change.   HENT: Negative.    Respiratory: Negative for shortness of breath and wheezing.    Cardiovascular: Negative for chest pain, palpitations and leg swelling.   Gastrointestinal: Positive for anal bleeding. Negative for abdominal pain and blood in stool.   Genitourinary: Positive for vaginal bleeding. Negative for dysuria, pelvic pain, urgency, vaginal discharge and vaginal pain.   Skin: Negative.    Neurological: Negative.    Hematological: Negative.  Negative for adenopathy.   Psychiatric/Behavioral: Negative.  Negative for dysphoric mood. The patient is not nervous/anxious.             Objective  "  Physical Exam  Vitals signs reviewed. Exam conducted with a chaperone present.   Constitutional:       Appearance: She is well-developed.   HENT:      Head: Normocephalic.   Eyes:      Pupils: Pupils are equal, round, and reactive to light.   Neck:      Thyroid: No thyromegaly.      Trachea: No tracheal deviation.   Cardiovascular:      Rate and Rhythm: Normal rate and regular rhythm.      Heart sounds: Normal heart sounds. No murmur.   Pulmonary:      Effort: Pulmonary effort is normal. No respiratory distress.      Breath sounds: Normal breath sounds.   Chest:      Breasts:         Right: Normal. No mass, nipple discharge or tenderness.         Left: Normal. No mass, nipple discharge or tenderness.   Abdominal:      Palpations: Abdomen is soft. There is no mass.      Tenderness: There is no abdominal tenderness.      Hernia: No hernia is present.   Genitourinary:     General: Normal vulva.      Labia:         Right: No tenderness or lesion.         Left: No tenderness or lesion.       Urethra: No prolapse or urethral lesion.      Vagina: Normal. No vaginal discharge.      Uterus: Absent.       Adnexa:         Right: No fullness.          Left: No fullness.        Rectum: Normal. Guaiac result positive. No external hemorrhoid or internal hemorrhoid. Normal anal tone.      Comments: External genitalia normal.  Exam consistent with atrophic vaginitis as bleeding with Pap smear.  Lymphadenopathy:      Cervical: No cervical adenopathy.      Upper Body:      Right upper body: No axillary adenopathy.      Left upper body: No axillary adenopathy.   Skin:     General: Skin is warm and dry.      Findings: No rash.   Neurological:      Mental Status: She is alert and oriented to person, place, and time.   Psychiatric:         Behavior: Behavior normal.         /75   Ht 157.5 cm (62\")   Wt 58.9 kg (129 lb 12.8 oz)   BMI 23.74 kg/m²     Assessment/Plan   Diagnoses and all orders for this visit:    1. Encounter for " No gynecological examination with abnormal finding (Primary)  -     IGP,rfx Aptima HPV All Pth  -     POC Occult Blood Stool    2. Atrophic vaginitis    3. Encounter for breast cancer screening using non-mammogram modality    Other orders  -     conjugated estrogens (Premarin) 0.625 MG/GM vaginal cream; 1/2 g vaginally twice weekly  Dispense: 30 g; Refill: 4        Mammogram.  Referred immediately to GI for colonoscopy.  Counseled about continuing calcium and vitamin D.  Discussed starting Premarin vaginal cream 1/2 g vaginally twice weekly.              Yes

## 2021-03-07 NOTE — ED PROVIDER NOTE - NS ED ROS FT
Constitutional: no fever, no sweats, and no chills.  Eyes: no pain, no redness, and no visual changes.  ENMT: no ear pain and no hearing problems, no nasal congestion/drainage, no dysphagia, and no throat pain, no neck pain, no stiffness  CV: no chest pain, no edema.  Resp: no cough, no dyspnea  GI: +abdominal pain, no bloating, no constipation, +diarrhea, +nausea and no vomiting.  : no dysuria, no hematuria  MSK: no msk pain, no weakness  Skin: no lesions, and no rashes.  Neuro: no LOC, no headache, no sensory deficits, and no weakness.

## 2021-03-07 NOTE — H&P ADULT - ASSESSMENT
89 yr old [place] female with PMH of Chronic diastolic CHF EF70%, moderate AS, hypertension, hypothyroid, breast cancer s/p lumpectomy and radiation in 2007, compression fx T11-T12, sigmoid diverticulitis, gallstone pancreatitis in April 2019 presents to ED with c/o abdominal pain and vomiting.    # Acute Pancreatitis with SIRs by hypothermia  Elevated LFTs, Lipase  normal bili. No Charcot's triad  CT with cholecystitis  USG STAT  IVF started in ED  to add Cipro, Flagyl (SIRS) as CT with dilated CBD, prior known gall stone pancreatitis and PCN allergy  GI consulted by ED  Bld c/s sent from ED  antiemetics  Will maintain on liq diet    # Hypernatremia  sec to dehydration  IVF    # Hypothermia  Hyperthermia blanket    # NAVIN on CKD stg 4 with metabolic acidosis  IVF  avoid nephrotoxics  will cont to assess for bicarb need    # Compressive atelectasis on CT  O2 saturation WNL  Incentive cole  PRn O2     # Chronic diastolic CHF EF70%, moderate AS, hypertension  Cont rest of Home meds    Heparin    D/W Dgtr about Surgical consult for a possible cholecystostomy tube sec to repeated pancreatitis. She refused any invasive procedures at this time. And patient is DNr/ DNi      89 yr old [place] female with PMH of Chronic diastolic CHF EF70%, moderate AS, hypertension, hypothyroid, breast cancer s/p lumpectomy and radiation in 2007, compression fx T11-T12, sigmoid diverticulitis, gallstone pancreatitis in April 2019 presents to ED with c/o abdominal pain and vomiting.    # Acute Pancreatitis with SIRs by hypothermia  Moderately severe pancreatitis  Elevated LFTs, Lipase  normal bili. No Charcot's triad  CT with cholecystitis  USG STAT  IVF started in ED  to add Cipro, Flagyl (SIRS) as CT with dilated CBD, prior known gall stone pancreatitis and PCN allergy  GI consulted by ED  Bld c/s sent from ED  antiemetics  Will maintain on liq diet    # Hypernatremia  sec to dehydration  IVF  avoid bolus now sec to h/o CHF    # Hypothermia  Hyperthermia blanket    # NAVIN on CKD stg 4 with metabolic acidosis  IVF  avoid nephrotoxics  will cont to assess for bicarb need    # Hypoglycemia  Dextrose in IVF added  also liq diet started    # Compressive atelectasis on CT  O2 saturation WNL  Incentive cole  PRN O2     # Macrocytic anemia  monitor H/H  b12 and folate levels    # Chronic diastolic CHF EF70%, moderate AS, hypertension  Cont rest of Home meds    Heparin    D/W Dgtr about Surgical consult for a possible cholecystostomy tube sec to repeated pancreatitis. She refused any invasive procedures at this time. And patient is DNr/ DNi

## 2021-03-07 NOTE — ED ADULT NURSE REASSESSMENT NOTE - NS ED NURSE REASSESS COMMENT FT1
pt resting in cart, CT completed, IV site patent, pt awaiting further testing. remains on prima fit for incontinence.

## 2021-03-07 NOTE — H&P ADULT - NSHPPHYSICALEXAM_GEN_ALL_CORE
Vital Signs Last 24 Hrs  T(C): 33.4 (03-07-21 @ 10:00), Max: 33.4 (03-07-21 @ 10:00)  T(F): 92.2 (03-07-21 @ 10:00), Max: 92.2 (03-07-21 @ 10:00)  HR: 80 (03-07-21 @ 11:30) (74 - 80)  BP: 154/87 (03-07-21 @ 11:30) (154/87 - 181/69)  BP(mean): --  RR: 16 (03-07-21 @ 11:30) (16 - 18)  SpO2: 98% (03-07-21 @ 11:30) (97% - 98%)    GENERAL: [place] elderly female, currently no c/o pain  HEAD:  Atraumatic, Normocephalic  EYES: EOMI, conjunctiva and sclera clear  NECK: Supple, No JVD, Normal thyroid  NERVOUS SYSTEM:  Alert & Oriented X 1-2, Moving all extremities well  CHEST/LUNG: CTA bilaterally; No rales, rhonchi, wheezing, or rubs  HEART: Regular rate and rhythm; 2/6 SOHAN, rubs, or gallops  ABDOMEN: Soft, Nontender, Nondistended; Bowel sounds present  EXTREMITIES:  2+ Peripheral Pulses, No clubbing, cyanosis, or edema

## 2021-03-07 NOTE — ED PROVIDER NOTE - OBJECTIVE STATEMENT
The patient is a 89y Female complaining of abdominal pain. Pt is a 89 y.o. F hx pancreatitis, chronic CHF, Glaucoma, HTN and hypothyroidism presented [facility] ED with abdominal pain. Pain is localized to the epigastrum, radiates to the back. +nausea, denies emesis. Denies fever, sweats, chills, chest pain, or shortness of breath.

## 2021-03-07 NOTE — ED ADULT NURSE NOTE - NSFALLRSKASSESASSIST_ED_ALL_ED
Patient Information/Instructions    Wound Care After Skin Biopsy    1.  Keep area dry for 24 hours.    2.  After the first 24 hours, gently cleanse the biopsy site 1-2 times each day with soap and water, pat dry and apply ointment (such as polysporin or vaseline) and a new bandage to the area until it is healed.  Keeping the area covered will allow it to heal more quickly than if it is left open to the air.    3.  If stitches have been placed in the site, continue the daily wound care outlined above, until the stitches are removed in our office.    4.  Please call our office immediately if any signs of infection occur at the biopsy site (increased pain, pus or red streaks from the site) or if bleeding or excessive swelling occurs.  A thin pink rim surrounding the site is normal.  We can be reached at [phone].    5.  If bleeding occurs within the first 24 hours after surgery, apply firm pressure and ice to the area for 10-20 minutes.  If this does not stop the bleeding, please call the office.    6.  You will be notified of results by letter or phone call (or at your re-check appointment).  If a month or more has passed since your procedure and you have not received results, please call our office.    Merari Dietz M.D.  [facility]  Dermatology Department ([phone])  
yes

## 2021-03-07 NOTE — H&P ADULT - HISTORY OF PRESENT ILLNESS
89 yr old [place] female with PMH of Chronic diastolic CHF EF70%, moderate AS, hypertension, hypothyroid, breast cancer s/p lumpectomy and radiation in 2007, compression fx T11-T12, sigmoid diverticulitis, gallstone pancreatitis in April 2019 presents to ED with c/o abdominal pain started last night radiating to the back, sharp midline, moderate. Per daughter she has chronic back pain. This AM associated vomiting noted by daughter and brought to ED. Per dgtr presentation is similar to 2019 pancreatitis episode. Nausea +. Denies fever, chills, diarrhea, constipation   Per Dgtr pt is DNR/ DNI and she will bring the papers. SHe does not want any invasive procedures/ operations    SH: from home, lives with her daughter, not ambulatory, [place]  FH- she is unable to recall

## 2021-03-07 NOTE — ED PROVIDER NOTE - ATTENDING CONTRIBUTION TO CARE
89 yof pmh HTN, chf, chronic back pain here for vomiting and abd pain. Has chronic back pain. No recent falls. lives with daughter 89 yof pmh HTN, chf, chronic back pain here for vomiting and abd pain. Has chronic back pain. No recent falls. lives with daughter who called EMS after patient vomited today. Patient very hard of hearing. Denies fevers at home. Limited history from patient.  AP - abd tender in right side. hypothermic here. eval for pancreatitis. labs, cultures, CT. will need admission

## 2021-03-07 NOTE — ED ADULT NURSE REASSESSMENT NOTE - NS ED NURSE REASSESS COMMENT FT1
pt returned from [facility] at this time. awaiting bed assignment. pt resting comfortably. skin warm dry and intact. VSS.

## 2021-03-07 NOTE — ED PROVIDER NOTE - PHYSICAL EXAMINATION
General: NAD  Head:  NC, AT  Eyes: EOMI, PERRLA, no scleral icterus  Ears: no erythema/drainage  Nose: midline, no bleeding/drainage  Throat: MMM  Cardiac: RRR, no m/r/g, no lower extremity edema  Respiratory: CTABL, no wheezes/rales/rhonchi, equal chest wall expansions, no use of accessory muscles, no retractions  Abdomen: soft, nondistended, intense epigastrum tenderness, no rebound tenderness, no guarding, nonperitonitic  MSK/Vascular: full ROM, soft compartments, warm extremities  Neuro: Alert and oriented, motor/sensory intact  Psych: calm, cooperative, normal affect

## 2021-03-07 NOTE — ED PROVIDER NOTE - FAMILY HISTORY
Sibling  Still living? Unknown  Family hx of lung cancer, Age at diagnosis: Age Unknown  Family history of uterine cancer, Age at diagnosis: Age Unknown  Family history of brain cancer, Age at diagnosis: Age Unknown

## 2021-03-07 NOTE — ED PROVIDER NOTE - SECONDARY DIAGNOSIS.
Anemia, unspecified type Acute renal failure superimposed on chronic kidney disease, unspecified CKD stage, unspecified acute renal failure type

## 2021-03-07 NOTE — ED ADULT NURSE NOTE - OBJECTIVE STATEMENT
pt BIBA from home for reports of back pain radiating to her left flank, as per daughter pt with chronic back pain that radiates this way when she has pancreatitis. pt is from home, lives with her daughter, not ambulatory, deneis trauma/falls. skin in tact, pt hypothermic on arrival, remains AOX3. hard of hearing. denies SOB or chest pain, no shortness of breath. pt with no urinary complaints, pamper in place for incontinence. no vomiting, pt reports some nausea present, abd soft non distended but slightly tender on exam diffusely. denies diarrhea.

## 2021-03-07 NOTE — ED PROVIDER NOTE - PROGRESS NOTE DETAILS
[phone] Camryn (daughter) confirming patient DNI/DNR. does not want any surgical intervention including perc meghan. agree to abx and fluids [phone] Camryn (daughter) confirming patient DNI/DNR. does not want any surgical intervention including perc meghan or tube for drainage of gallbladder. agree to abx and fluids. will admit to medicine for further management

## 2021-03-08 NOTE — GOALS OF CARE CONVERSATION - ADVANCED CARE PLANNING - TREATMENT GUIDELINE COMMENT
Hydrate, transfuse, treat pain and discomfort.    SARAH completed and in her chart Hospice consult for home support

## 2021-03-08 NOTE — CONSULT NOTE ADULT - ATTENDING COMMENTS
COUNSELING:    Face to face meeting to discuss Advanced Care Planning - Time Spent ___30___ Minutes.  See goals of care note.    More than 50% time spent in counseling and coordinating care. ___25___ Minutes.     Thank you for the opportunity to assist with the care of this patient.   [facility] [phone].

## 2021-03-08 NOTE — PROGRESS NOTE ADULT - PROBLEM SELECTOR PLAN 1
Likely biliary given that in CT she had a distended GB. Pt is NOT a candidate for any type of endoscopic or surgical procedures given her extremely frail clinical condition as a result of her very advanced age. Feed as tolerated. No plans or need for continued GI f/u. Would NOT pursue the etiology of her pancreatitis further with MRI/ MRCP for the above reasons. No plans or need for continued GI f/u. Signing off. Reconsult as needed. Thank you.

## 2021-03-08 NOTE — CHART NOTE - NSCHARTNOTEFT_GEN_A_CORE
Called by RN, as per monitor tech patient with Heart block II type 1 on CM  STAT EKG showed NSR HR 80, EKG similar to EKG on admission   No heart block appreciated on EKG   Patient is asymptomatic  CBC, BMP, Electrolytes pending for AM    RN to notify with any acute changes

## 2021-03-08 NOTE — CONSULT NOTE ADULT - CONSULT REASON
Pancreatitis
89 yo frail elderly female  Pancreatitis and Biliary Disease  Is a surgical candidate-not stable H/H dropped may need transfusion

## 2021-03-08 NOTE — PROGRESS NOTE ADULT - ASSESSMENT
89 yr old [place] female with PMH of Chronic diastolic CHF EF70%, moderate AS, hypertension, hypothyroid, breast cancer s/p lumpectomy and radiation in 2007, compression fx T11-T12, sigmoid diverticulitis, gallstone pancreatitis in April 2019 presents to ED with c/o abdominal pain and vomiting.  Lipase >3000, LFT's elevated.  CT: GS, dilated CBD ? cholecystitis, US with same.  ABX started with cipro/flagyl.  GI consulted.  After d/w family- no aggressive interventions: no perc drain, no surg options.  They are ok with supportive treatment including fluids, trial of abx, blood transfusion.  PC consulted and confirmed DNR/DNI and goals with family.    #Acute GS Pancreatitis  -Elevated LFTs, Lipase  -normal bili.  -CT/US with possible cholecystitis- no pancreas changes  -GI consulted: no further reccs  -f/u blood cx  -antiemetics  -Will maintain on liq diet    #SIRS/ acute cholecystitis  -hypothermia  -acute cholecystitis  -IVF  -abx  -hypothermia blanket    # Hypernatremia  -2/2 dehydration  -IVF    #acute/chronic anemia  -consented for PRBC with daughter  -transfuse 2 units today  -f/u cbc in am    # NAVIN on CKD stg 4 with metabolic acidosis  -IVF/PRBC  -avoid nephrotoxics  -f/u labs am    # Hypoglycemia  -Dextrose in IVF added  -CL diet, will advance as tolerated    # Compressive atelectasis on CT  -O2 saturation WNL  -Incentive cole  -PRN O2     # Chronic diastolic CHF EF70%, moderate AS, hypertension, hypothyroidism  -Cont home hydralazine  -continue home synthroid    Heparin  dnr/dni  d/w daughter by phone:  plan to give IVF, PRBC, abx.  f/u labs in am and further decision making at that time.  No invasive interventions.  PC consulted- plan d/c home with hospice services eventually

## 2021-03-08 NOTE — CONSULT NOTE ADULT - SUBJECTIVE AND OBJECTIVE BOX
HPI:  89 yr old [place] female with PMH of Chronic diastolic CHF EF70%, moderate AS, hypertension, hypothyroid, breast cancer s/p lumpectomy and radiation in 2007, compression fx T11-T12, sigmoid diverticulitis, gallstone pancreatitis in April 2019 presents to ED with c/o abdominal pain started last night radiating to the back, sharp midline, moderate. Per daughter she has chronic back pain. This AM associated vomiting noted by daughter and brought to ED. Per daughter presentation is similar to 2019 pancreatitis episode. Nausea +. Denies fever, chills, diarrhea, constipation. Pt is DNR/ DNI and she will bring the papers. SHe does not want any invasive procedures/ operations      PAST MEDICAL & SURGICAL HISTORY:  Chronic CHF    Glaucoma    HTN (hypertension)    Breast cancer  s/p RT lumpectomy and radiation    Hypothyroid    S/P cataract extraction  bilateral    S/P lumpectomy, right breast        ROS:  Could not be obtained adequately. Although, mentioned nausea, and abdominal pain.     MEDICATIONS  (STANDING):  ciprofloxacin   IVPB 200 milliGRAM(s) IV Intermittent daily  dextrose 5% + lactated ringers. 1000 milliLiter(s) (100 mL/Hr) IV Continuous <Continuous>  heparin   Injectable 5000 Unit(s) SubCutaneous every 12 hours  hydrALAZINE 50 milliGRAM(s) Oral three times a day  levothyroxine Injectable 12.5 MICROGram(s) IV Push at bedtime  metroNIDAZOLE  IVPB 500 milliGRAM(s) IV Intermittent once  metroNIDAZOLE  IVPB 500 milliGRAM(s) IV Intermittent every 8 hours  pantoprazole  Injectable 40 milliGRAM(s) IV Push daily    MEDICATIONS  (PRN):  morphine  - Injectable 0.5 milliGRAM(s) IV Push every 4 hours PRN Moderate Pain (4 - 6)  ondansetron Injectable 4 milliGRAM(s) IV Push every 6 hours PRN Nausea and/or Vomiting      Allergies    codeine (Vomiting)  penicillin (Rash; Anaphylaxis)  Sulfacetamide Sodium (Rash)    Intolerances        SOCIAL HISTORY:NC    ENDOSCOPIC/GI HISTORY:NC    FAMILY HISTORY:  Family history of brain cancer (Sibling)  Brother    Family history of uterine cancer (Sibling)  Sister - cervical/uterine    Family hx of lung cancer (Sibling)  2 Brothers        Vital Signs Last 24 Hrs  T(C): 33.4 (07 Mar 2021 10:00), Max: 33.4 (07 Mar 2021 10:00)  T(F): 92.2 (07 Mar 2021 10:00), Max: 92.2 (07 Mar 2021 10:00)  HR: 80 (07 Mar 2021 15:15) (74 - 84)  BP: 154/68 (07 Mar 2021 15:15) (154/68 - 181/69)  BP(mean): --  RR: 16 (07 Mar 2021 15:15) (16 - 18)  SpO2: 97% (07 Mar 2021 15:15) (97% - 98%)    PHYSICAL EXAM:    GENERAL: Frail elderly woman, pale appearing  HEAD:  Atraumatic, Normocephalic  EYES: EOMI, PERRLA, conjunctiva and sclera clear  ENMT: No tonsillar erythema, exudates, or enlargement; Moist mucous membranes, Good dentition, No lesions  NECK: Supple, No JVD, Normal thyroid  CHEST/LUNG: Clear to percussion bilaterally; No rales, rhonchi, wheezing, or rubs  HEART: Regular rate and rhythm; No murmurs, rubs, or gallops  ABDOMEN: Soft, tender, Nondistended; Bowel sounds present  EXTREMITIES:  2+ Peripheral Pulses, No clubbing, cyanosis, or edema  LYMPH: No lymphadenopathy noted  SKIN: No rashes or lesions      LABS:                        7.8    8.13  )-----------( 167      ( 07 Mar 2021 10:41 )             24.5     03-07    146<H>  |  109<H>  |  69.0<H>  ----------------------------<  87  5.5<H>   |  20.0<L>  |  3.64<H>    Ca    9.5      07 Mar 2021 10:41    TPro  7.5  /  Alb  4.2  /  TBili  0.8  /  DBili  x   /  AST  368<H>  /  ALT  142<H>  /  AlkPhos  190<H>  03-07    PT/INR - ( 07 Mar 2021 10:41 )   PT: 10.4 sec;   INR: 0.89 ratio         PTT - ( 07 Mar 2021 10:41 )  PTT:45.0 sec       LIVER FUNCTIONS - ( 07 Mar 2021 10:41 )  Alb: 4.2 g/dL / Pro: 7.5 g/dL / ALK PHOS: 190 U/L / ALT: 142 U/L / AST: 368 U/L / GGT: x               RADIOLOGY & ADDITIONAL STUDIES:< from: US Abdomen Upper Quadrant Right (03.07.21 @ 14:27) >     EXAM:  US ABDOMEN RT UPR QUADRANT                          PROCEDURE DATE:  03/07/2021          INTERPRETATION:  CLINICAL INFORMATION: Gallstones. Pancreatitis.    COMPARISON: CT abdomen/pelvis of the same day, CT abdomen/pelvis 6/2/2019, and gallbladder ultrasound 4/4/2019    TECHNIQUE: Sonography of the right upper quadrant.    FINDINGS:    Liver: Within normal limits.  Bile ducts: Mild intrahepatic biliary ductal dilatation. Common bile duct measures 10 mm, previously 8mm on 4/4/2019.  Gallbladder: Distended with gallstones. Small amount of pericholecystic fluid. No wall thickening.  Pancreas: Dilated pancreatic duct measuring 4 mm.  Right kidney: 6.8 cm. No hydronephrosis. Atrophic.  Ascites: None.  Aorta/IVC: Visualized portions normal in caliber. Atherosclerotic changes in the abdominal aorta.    IMPRESSION:    Distended gallbladder with gallstones and small amount of pericholecystic fluid; a HIDA scan could be obtained to further evaluate for cholecystitis.  < from: CT Abdomen and Pelvis No Cont (03.07.21 @ 11:25) >     EXAM:  CT ABDOMEN AND PELVIS                          PROCEDURE DATE:  03/07/2021          INTERPRETATION:  CLINICAL INFORMATION: Flank pain    COMPARISON: CT abdomen 6/2/2019    CONTRAST/COMPLICATIONS:  IV Contrast: None / / .  Oral Contrast: None  Complications: None    PROCEDURE:  CT of the Abdomen and Pelvis was performed.  Sagittal and coronal reformats were performed.    FINDINGS:  LOWER CHEST: Partially visualized mild right pleural effusion with right lower lobe compressive atelectasis. Coronary vascular calcification. Partially visualized left atrial enlargement. Findings within the heart suggesting anemia.    LIVER: Within normal limits.  BILE DUCTS: Common bile duct measures up to 1 cm which is increased from prior exam, correlate with liver function tests and bilirubin levels.  GALLBLADDER: Distended gallbladder with gallstones.  SPLEEN: Within normal limits.  PANCREAS: Within normal limits.  ADRENALS: Within normal limits.  KIDNEYS/URETERS: Mild bilateral renal atrophy. Nohydronephrosis. Posterior exophytic left upper pole renal cyst. The ureters are unremarkable.    BLADDER: Distended bladder.  REPRODUCTIVE ORGANS: Uterus and adnexa within normal limits.    BOWEL: No bowel obstruction. Appendix is not visualized. No evidence of inflammation in the pericecal region. Colonic diverticulosis without diverticulitis.. Large gastric fundal diverticulum unchanged. PERITONEUM: No ascites.  VESSELS: Atherosclerotic changes.  RETROPERITONEUM/LYMPH NODES: No lymphadenopathy.  ABDOMINAL WALL: Within normal limits.  BONES: Chronic appearing posterior right-sided rib fractures. Chronic appearing L1-L4 right-sided transverse process fractures. Mild chronic appearing compression deformities of T11 and T12. Degenerative changes and osteopenia.    IMPRESSION:  Gallstones in a distended gallbladder, recommend right upper quadrant ultrasound to exclude cholecystitis.    Mildly prominent common bile duct increased from 6/2/2019. Recommend correlation with liver function tests and bilirubin levels    Partially visualized mild right pleural effusion with right lower lobe compressive atelectasis.            RONALD HARVEY MD; Attending Radiologist  This document has been electronically signed. Mar  7 2021 11:53AM    < end of copied text >    Common bile duct measures 1 cm, increased in caliber since prior studies; correlation is recommended with LFTs; an MRI/MRCP of the abdomen could be obtained for further evaluation.            CAROLYN HUA MD; Attending Radiologist  This document has been electronically signed. Mar  7 2021  3:02PM    < end of copied text >  
This is a frail elderly Female Multiple medical co-morbidities including Chronic Systolic CHF, Moderate AS, HTN, Hypothyroid, Breast Ca and Lumpectomy (RT '2007) Compression Fx's T11-T12, sigmoid diverticulitis, _Gallstones leading to Pancreaitis April '2019. Admitted through ED  with abdominal pain onset night before sharp midline radiating to back. Patient has chronic back pain. +Nausea. Vomited before coming in.  No fever, chills diarrhea, constipation. Daughter asking to treat acutely, avoiding any surgical intervention or painful diagnostics. Morena was updated on Mom's anemia today, may need transfusion which she would want treated.   HPI:  89 yr old [place] female with PMH of Chronic diastolic CHF EF70%, moderate AS, hypertension, hypothyroid, breast cancer s/p lumpectomy and radiation in 2007, compression fx T11-T12, sigmoid diverticulitis, gallstone pancreatitis in April 2019 presents to ED with c/o abdominal pain started last night radiating to the back, sharp midline, moderate. Per daughter she has chronic back pain. This AM associated vomiting noted by daughter and brought to ED. Per dgtr presentation is similar to 2019 pancreatitis episode. Nausea +. Denies fever, chills, diarrhea, constipation   Per Dgtr pt is DNR/ DNI and she will bring the papers. SHe does not want any invasive procedures/ operations    SH: from home, lives with her daughter, not ambulatory, [facility]- she is unable to recall  (07 Mar 2021 12:38)    PERTINENT PMH REVIEWED: Yes     PAST MEDICAL & SURGICAL HISTORY:  Chronic CHF  Glaucoma  HTN (hypertension)  Breast cancer  s/p RT lumpectomy and radiation  Hypothyroid    S/P cataract extraction  bilateral  S/P lumpectomy, right breast    SOCIAL HISTORY:  EtOH No                              Drugs  No                             nonsmoker                              Admitted from: home  HCP-daughter  Reggie [phone]     FAMILY HISTORY:  Family history of brain cancer (Sibling)  Brother    Family history of uterine cancer (Sibling)  Sister - cervical/uterine  Family hx of lung cancer (Sibling)  2 Brothers    Father:  Mother:   No family history related to admission diagnosis    Allergies  codeine (Vomiting)  penicillin (Rash; Anaphylaxis)  Sulfacetamide Sodium (Rash)    Intolerances    Baseline ADLs (prior to admission):   Dependent      Present Symptoms:     Dyspnea: 0 1   Nausea/Vomiting:No  Anxiety:  No  Depression: No  Fatigue: Yes   Loss of appetite: Yes needs to be fed- slow and puree consistency    Pain: Patient denies-Daughter admits Mom has suffered for years with severe Cervical Neck and B/L shoulder arthritis pain          Also chronic back pain Compression Fx's            Character-            Duration-            Effect-            Factors-            Frequency-            Location-            Severity-moderate to severe    Review of Systems: Reviewed                       Unable to obtain due to severe [place]      MEDICATIONS  (STANDING):  acetaminophen    Suspension .. 650 milliGRAM(s) Oral every 8 hours  acetaminophen  IVPB .. 750 milliGRAM(s) IV Intermittent once  ciprofloxacin   IVPB 200 milliGRAM(s) IV Intermittent daily  dextrose 5% + lactated ringers. 1000 milliLiter(s) (100 mL/Hr) IV Continuous <Continuous>  heparin   Injectable 5000 Unit(s) SubCutaneous every 12 hours  hydrALAZINE 50 milliGRAM(s) Oral three times a day  levothyroxine Injectable 12.5 MICROGram(s) IV Push at bedtime  lidocaine   Patch 1 Patch Transdermal every 24 hours  metroNIDAZOLE  IVPB 500 milliGRAM(s) IV Intermittent every 8 hours  pantoprazole  Injectable 40 milliGRAM(s) IV Push daily  polyethylene glycol 3350 17 Gram(s) Oral at bedtime    MEDICATIONS  (PRN):  morphine  - Injectable 0.5 milliGRAM(s) IV Push every 4 hours PRN Moderate Pain (4 - 6)  morphine Concentrate 5 milliGRAM(s) SubLingual every 4 hours PRN Moderate Pain (4 - 6) or Dyspnea  ondansetron Injectable 4 milliGRAM(s) IV Push every 6 hours PRN Nausea and/or Vomiting    PHYSICAL EXAM:    Vital Signs Last 24 Hrs  T(C): 36.7 (08 Mar 2021 15:56), Max: 37 (08 Mar 2021 12:15)  T(F): 98.1 (08 Mar 2021 15:56), Max: 98.6 (08 Mar 2021 12:15)  HR: 62 (08 Mar 2021 15:56) (58 - 86)  BP: 92/51 (08 Mar 2021 15:56) (92/51 - 171/66)  BP(mean): --  RR: 20 (08 Mar 2021 15:56) (20 - 20)  SpO2: 94% (08 Mar 2021 15:56) (93% - 96%)    General: alert  oriented x _1___ lethargic                   cachexia  nonverbal  few words    HEENT: dry mouth     Lungs: comfortable     CV: normal  tachycardia    GI: normal  distended                constipation  last BM:     : incontinent  Primafit kasia    MSK:   weakness           stopped ambulating at home bedbound/wheelchair bound    Skin: pressure ulcers- Stage__II___  no rash    LABS:                        6.9    6.82  )-----------( 127      ( 08 Mar 2021 08:59 )             22.2     03-08    145  |  108<H>  |  71.0<H>  ----------------------------<  81  5.3   |  21.0<L>  |  3.98<H>    Ca    8.7      08 Mar 2021 07:36  Phos  6.4     03-08  Mg     2.7     03-08    TPro  6.5<L>  /  Alb  3.6  /  TBili  0.4  /  DBili  0.2  /  AST  186<H>  /  ALT  135<H>  /  AlkPhos  158<H>  03-08    PT/INR - ( 07 Mar 2021 10:41 )   PT: 10.4 sec;   INR: 0.89 ratio      PTT - ( 07 Mar 2021 10:41 )  PTT:45.0 sec    I&O's Summary    08 Mar 2021 07:01  -  08 Mar 2021 17:49  --------------------------------------------------------  IN: 0 mL / OUT: 550 mL / NET: -550 mL    RADIOLOGY & ADDITIONAL STUDIES:    ADVANCE DIRECTIVES:   DNR NO  Completed on:                     MOLST  YES    Completed on: TODAY 3/8/2021  Living Will  NO   Completed on:

## 2021-03-08 NOTE — GOALS OF CARE CONVERSATION - ADVANCED CARE PLANNING - CONVERSATION DETAILS
I called Morena from ED after seeing her mother. Mother was awake calm, denying pain. Being fed by HHA.    Morena was able to describe her Mother's functional limitations, She has become very weak, will not get OOB alone.  She has been eating independently, using the bathroom.    She is suffering with chronic Arthritis pain in neck and B/L shoulders. She has become depressed and obsesses about dying.  She has not been feeling well in some time.    We talked about MOLST Directive, which Morena admits had been done during last admission; I took a verbal consent  for DNR/DNI allowing a natural death at end of life.  Morena has left her job to take care of her Mom full time. She is now a CD PAP HHA for her mother.    GOC: treat acutely, give her Blood transfusion if necessary.   Avoid all invasive diagnostics and surgical procedures including Insertion of percutaneous drain CBI

## 2021-03-08 NOTE — CONSULT NOTE ADULT - ASSESSMENT
89 yo FRAIL ELDERLY FEMALE admitted with abdominal pain    Acute pancreatitis without infection or Necrosis  CT-+Gallstones  Lipase >3000  Needs IR percutaneous drainage tube placed  Daughter wanting conservative treatment  Not hemodynamically stable  H/H falling 6.5/20.3 redraw blood later today  Daughter will consent to blood transfusion        Abdominal Pain        Patient poor self advocate, she denies pain        Eating when fed clear liquid diet    Anemia  H/H 6.5/20.3 repeat labs 6/9 and  Not stable enough for IR tue placement  Transfusing with Parameters  Daily CBC    Chronic Back and Neck to shoulders Pain  Warm pack to posterior neck and shoulders  Lidoderm to posterior neck and shoulders during night  Added Ofirmev infusion Stat and Tylenol suspension Q8h ATC  Morphine 5mg SL Q4 prn dyspnea or pain    NAVIN  GFR 9 BUN/Cr 71/3.98 climbing  Avoid nephrotoxic medications  Gentle hydration  Daily labs  Will not want HD    GOC see separate GOC documentation  MOLST DNR/DNI completed and on chart  Interested in Hospice support  Maryse Guerra to make Hospice referral  
The elderly frail-appearing patient with previous history of gallstone pancreatitis and now with symptoms of nausea and vomiting with abdominal pain and biochemically suggestive of gallstone pancreatitis.  The patient should be managed with gentle IV fluid hydration and monitor her symptoms.  Agree with IV antibiotics for the time being.  May need to stop them as soon as the patient shows mild improvement.  The patient is at very high risk of significant clinical deterioration.  She cannot have any procedures at this time.  However if there is any clinical worsening and there is signs of acute cholecystitis, cholecystostomy tube may be considered.  No additional recommendations at this time.

## 2021-03-09 NOTE — PROGRESS NOTE ADULT - ASSESSMENT
[facility]  Will call daughter Morena again this morning. Need to discuss overall prognosis, transfusions are a temporary measure,  not curative. Patient needs more support at home. Will encourage Hospice support. 89 yo F with Gallbladder blockage causing Pancreatitis and abdominal pain    Assessment and Recommendation:   · Assessment	  89 yo FRAIL ELDERLY FEMALE admitted with abdominal pain    Acute pancreatitis without infection or Necrosis  CT-+Gallstones  Lipase >3000  Needs IR percutaneous drainage tube placed  Daughter wanting conservative treatment  Not hemodynamically stable  H/H falling 6.5/20.3 redraw blood later today  Daughter will consent to blood transfusion        Abdominal Pain        Patient poor self advocate, she denies pain        Eating when fed clear liquid diet    Anemia  H/H 6.5/20.3 repeat labs 6/9 and  Not stable enough for IR tue placement  Transfusing with Parameters  Daily CBC    Chronic Back and Neck to shoulders Pain  Warm pack to posterior neck and shoulders  Lidoderm to posterior neck and shoulders during night  Added Ofirmev infusion Stat and Tylenol suspension Q8h ATC  Morphine 5mg SL Q4 prn dyspnea or pain    NAVIN  GFR 9 BUN/Cr 71/3.98 climbing  Avoid nephrotoxic medications  Gentle hydration  Daily labs  Will not want HD    GOC see separate GOC documentation  MOLST DNR/DNI completed and on chart  Interested in Hospice support  Maryse Sanches to make Hospice referral  Daughter unsure if she is willing to stop future transfusions, acceptance to Hospice program hinges on whether  to continue transfusing with no end in site or go home with homecare  Asking for 24 hrs to think about this decision  Need to discuss overall prognosis, transfusions are a temporary measure,  not curative. Patient /Daughter need this support at home.

## 2021-03-09 NOTE — PROGRESS NOTE ADULT - ASSESSMENT
89 yr old [place] female with PMH of Chronic diastolic CHF EF70%, moderate AS, hypertension, hypothyroid, breast cancer s/p lumpectomy and radiation in 2007, compression fx T11-T12, sigmoid diverticulitis, gallstone pancreatitis in April 2019 presents to ED with c/o abdominal pain and vomiting.  Lipase >3000, LFT's elevated.  CT: GS, dilated CBD ? cholecystitis, US with same.  ABX started with cipro/flagyl.  GI consulted.  After d/w family- no aggressive interventions: no perc drain, no surg options.  They are ok with supportive treatment including fluids, trial of abx, blood transfusion.  PC consulted and confirmed DNR/DNI and goals with family.  Pt has continued to decline despite, antibiotics, fluids, supportive care.  She is now not eating and unresponsive.  Further d/w family-->will plan to decide on transition to comfort care in am.    #Acute GS Pancreatitis/acute cholecystitis  -Elevated LFTs, Lipase  -normal bili.  -CT/US with possible cholecystitis- no pancreas changes  -GI consulted: no further reccs  -blood cx no growth      #SIRS/ acute cholecystitis  -hypothermia  -acute cholecystitis  -IVF  -abx    # Hypernatremia  -2/2 dehydration  -IVF    #acute/chronic anemia  -transfused 2 units 3/8  -f/u cbc stable  -f/u labs am    # NAVIN on CKD stg 4 with metabolic acidosis  -IVF/PRBC  -avoid nephrotoxics  -f/u labs am    # Hypoglycemia  -Dextrose in IVF added  -CL diet: not eating 2/2 AMS    # Compressive atelectasis on CT  -O2 saturation WNL  -Incentive cole  -PRN O2     # Chronic diastolic CHF EF70%, moderate AS, hypertension, hypothyroidism  -held home hydralazine  -continue home synthroid IV    Heparin  dnr/dni  d/w daughter by phone again today- encouraged to consider transition to comfort care/ home with hospice support.  It is my opinion that current management is not benefiting pt and there is high risk that current management is prolonging suffering. f/u labs in am and further decision making at that time.  No invasive interventions. No change in current management.

## 2021-03-09 NOTE — PROGRESS NOTE ADULT - ATTENDING COMMENTS
COUNSELING:    Face to face meeting to discuss Advanced Care Planning - Time Spent ______ Minutes.  See goals of care note.    More than 50% time spent in counseling and coordinating care. ______ Minutes.     Thank you for the opportunity to assist with the care of this patient.   [facility] [phone]. COUNSELING:    Face to face meeting to discuss Advanced Care Planning - Time Spent ______ Minutes.  See goals of care note.    More than 50% time spent in counseling and coordinating care. __30____ Minutes.     Thank you for the opportunity to assist with the care of this patient.   [facility] [phone].

## 2021-03-10 NOTE — DISCHARGE NOTE PROVIDER - HOSPITAL COURSE
89 yr old [place] female with PMH of Chronic diastolic CHF EF70%, moderate AS, hypertension, hypothyroid, breast cancer s/p lumpectomy and radiation in 2007, compression fx T11-T12, sigmoid diverticulitis, gallstone pancreatitis in April 2019 presents to ED with c/o abdominal pain and vomiting.  Lipase >3000, LFT's elevated.  CT: GS, dilated CBD ? cholecystitis, US with same.  ABX started with cipro/flagyl.  GI consulted.  After d/w family- no aggressive interventions: no perc drain, no surg options.  They are ok with supportive treatment including fluids, trial of abx, blood transfusion.  PC consulted and confirmed DNR/DNI and goals with family.  Pt has continued to decline despite, antibiotics, fluids, supportive care.  She is now not eating and unresponsive.  Further d/w family with the assistance of PC/hospice.  Pt will go home with hospice to follow with comfort measures.

## 2021-03-10 NOTE — PROGRESS NOTE ADULT - SUBJECTIVE AND OBJECTIVE BOX
INTERVAL HPI/OVERNIGHT EVENTS: 89 yo Female frail elderly admitted with abdominal pain. Treating for Obstructive Cholecystitis Pancreatitis  F/U Note:  No new events overnight  O2 2L NC  afebrile T98.1 WBC 1.60 H/H 9.2/28.3 87 platelets  LFT's elevated  Not taking anything by mouth, poor appetite  ROS not possible    89y old  Female who presents with a chief complaint of pancreatitis (08 Mar 2021 17:48)  PAST MEDICAL & SURGICAL HISTORY:  Chronic CHF  Glaucoma  HTN (hypertension)  Breast cancer  s/p RT lumpectomy and radiation  Hypothyroid  S/P cataract extraction  bilateral  S/P lumpectomy, right breast    Present Symptoms:     Dyspnea: 0 1    Nausea/Vomiting:  No  Anxiety:  Yes ??  Depression:  No  Fatigue: Yes   Loss of appetite: Yes     Pain: assume pain is present            Character-            Duration-            Effect-            Factors-            Frequency-            Location-            Severity-    Review of Systems: Reviewed                        Unable to obtain due to poor mentation       MEDICATIONS  (STANDING):  acetaminophen    Suspension .. 650 milliGRAM(s) Oral every 8 hours  ciprofloxacin   IVPB 200 milliGRAM(s) IV Intermittent daily  dextrose 5% + lactated ringers. 1000 milliLiter(s) (100 mL/Hr) IV Continuous <Continuous>  heparin   Injectable 5000 Unit(s) SubCutaneous every 12 hours  levothyroxine Injectable 12.5 MICROGram(s) IV Push at bedtime  lidocaine   Patch 1 Patch Transdermal every 24 hours  metroNIDAZOLE  IVPB 500 milliGRAM(s) IV Intermittent every 8 hours  pantoprazole  Injectable 40 milliGRAM(s) IV Push daily  polyethylene glycol 3350 17 Gram(s) Oral at bedtime    MEDICATIONS  (PRN):  morphine  - Injectable 0.5 milliGRAM(s) IV Push every 4 hours PRN Moderate Pain (4 - 6)  morphine Concentrate 5 milliGRAM(s) SubLingual every 4 hours PRN Moderate Pain (4 - 6) or Dyspnea  ondansetron Injectable 4 milliGRAM(s) IV Push every 6 hours PRN Nausea and/or Vomiting      General: alert  [place] ?____ lethargic                   cachexia  nonverbal      HEENT: normal  dry mouth      Lungs: comfortable    CV: normal  tachycardia    GI:  distended  tender                 constipation  last BM:     : normal  incontinent      MSK:   weakness              bedbound/wheelchair bound    Skin: _  no rash    LABS:                        9.2    1.60  )-----------( 87       ( 09 Mar 2021 07:09 )             28.3     03-09    144  |  109<H>  |  68.0<H>  ----------------------------<  75  5.2   |  21.0<L>  |  3.61<H>    Ca    8.4<L>      09 Mar 2021 07:09  Phos  6.4     03-08  Mg     2.7     03-08    TPro  6.2<L>  /  Alb  3.4  /  TBili  0.4  /  DBili  x   /  AST  107<H>  /  ALT  101<H>  /  AlkPhos  129<H>  03-09    PT/INR - ( 07 Mar 2021 10:41 )   PT: 10.4 sec;   INR: 0.89 ratio      PTT - ( 07 Mar 2021 10:41 )  PTT:45.0 sec    I&O's Summary    08 Mar 2021 07:01  -  09 Mar 2021 07:00  --------------------------------------------------------  IN: 300 mL / OUT: 650 mL / NET: -350 mL    Vital Signs Last 24 Hrs  T(C): 36.7 (08 Mar 2021 22:30), Max: 37 (08 Mar 2021 12:15)  T(F): 98.1 (08 Mar 2021 22:30), Max: 98.6 (08 Mar 2021 12:15)  HR: 73 (09 Mar 2021 09:01) (58 - 73)  BP: 149/64 (09 Mar 2021 09:01) (92/51 - 149/64)  BP(mean): --  RR: 18 (09 Mar 2021 09:01) (18 - 20)  SpO2: 94% (09 Mar 2021 09:01) (94% - 100%)    RADIOLOGY & ADDITIONAL STUDIES:    ADVANCE DIRECTIVES:   DNR YES   Completed on:                     MOLST  YES   Completed on: 3/8/2021  Living Will  NO   Completed on:    
Pt seen and examined. Very lethargic this AM. Extremely frail and debilitated. Admitted for acute pancreatitis which is likely biliary in etiology. Pt. no-communicative and cannot obtain any history from the pt.     REVIEW OF SYSTEMS:  Unobtainable in this pt.      MEDICATIONS:  MEDICATIONS  (STANDING):  ciprofloxacin   IVPB 200 milliGRAM(s) IV Intermittent daily  dextrose 5% + lactated ringers. 1000 milliLiter(s) (100 mL/Hr) IV Continuous <Continuous>  heparin   Injectable 5000 Unit(s) SubCutaneous every 12 hours  hydrALAZINE 50 milliGRAM(s) Oral three times a day  levothyroxine Injectable 12.5 MICROGram(s) IV Push at bedtime  metroNIDAZOLE  IVPB 500 milliGRAM(s) IV Intermittent every 8 hours  pantoprazole  Injectable 40 milliGRAM(s) IV Push daily    MEDICATIONS  (PRN):  morphine  - Injectable 0.5 milliGRAM(s) IV Push every 4 hours PRN Moderate Pain (4 - 6)  ondansetron Injectable 4 milliGRAM(s) IV Push every 6 hours PRN Nausea and/or Vomiting      Allergies    codeine (Vomiting)  penicillin (Rash; Anaphylaxis)  Sulfacetamide Sodium (Rash)    Intolerances        Vital Signs Last 24 Hrs  T(C): 33.4 (07 Mar 2021 10:00), Max: 33.4 (07 Mar 2021 10:00)  T(F): 92.2 (07 Mar 2021 10:00), Max: 92.2 (07 Mar 2021 10:00)  HR: 73 (08 Mar 2021 04:12) (73 - 86)  BP: 157/67 (08 Mar 2021 04:12) (129/67 - 181/69)  BP(mean): --  RR: 16 (07 Mar 2021 15:15) (16 - 18)  SpO2: 95% (08 Mar 2021 04:12) (93% - 98%)      PHYSICAL EXAM:    General: Well developed; extremely frail; in no acute distress, but very lethargic this AM. She looks every bit her age.  HEENT: MMM, conjunctiva pink and sclera anicteric.  Lungs: clear to auscultation bilaterally.  Cor: RRR S1, S2 only.  Gastrointestinal: Abdomen: Soft non-tender non-distended; Normal bowel sounds; No hepatosplenomegaly.  Extremities: no cyanosis, clubbing or edema.  Skin: Warm and dry. No obvious rash  Neuro: Pt. very lethargic this AM responding only to her name.    LABS:  CBC Full  -  ( 07 Mar 2021 10:41 )  WBC Count : 8.13 K/uL  RBC Count : 2.19 M/uL  Hemoglobin : 7.8 g/dL  Hematocrit : 24.5 %  Platelet Count - Automated : 167 K/uL  Mean Cell Volume : 111.9 fl  Mean Cell Hemoglobin : 35.6 pg  Mean Cell Hemoglobin Concentration : 31.8 gm/dL  Auto Neutrophil # : 6.18 K/uL  Auto Lymphocyte # : 1.09 K/uL  Auto Monocyte # : 0.79 K/uL  Auto Eosinophil # : 0.02 K/uL  Auto Basophil # : 0.03 K/uL  Auto Neutrophil % : 76.1 %  Auto Lymphocyte % : 13.4 %  Auto Monocyte % : 9.7 %  Auto Eosinophil % : 0.2 %  Auto Basophil % : 0.4 %    03-07    144  |  110<H>  |  69.0<H>  ----------------------------<  100<H>  5.7<H>   |  20.0<L>  |  3.77<H>    Ca    8.6      07 Mar 2021 19:45    TPro  7.5  /  Alb  4.2  /  TBili  0.8  /  DBili  x   /  AST  368<H>  /  ALT  142<H>  /  AlkPhos  190<H>  03-07    PT/INR - ( 07 Mar 2021 10:41 )   PT: 10.4 sec;   INR: 0.89 ratio         PTT - ( 07 Mar 2021 10:41 )  PTT:45.0 sec                  RADIOLOGY & ADDITIONAL STUDIES (The following images were personally reviewed):
[facility] Division of Hospital Medicine      SUBJECTIVE / OVERNIGHT EVENTS:  Groans only.  opens eyes.  not eating.  not responsive.    no vomitin/diarrhea.  All remainder ROS unable to obtain secondary to AMS    MEDICATIONS  (STANDING):  acetaminophen    Suspension .. 650 milliGRAM(s) Oral every 8 hours  ciprofloxacin   IVPB 200 milliGRAM(s) IV Intermittent daily  dextrose 5% + lactated ringers. 1000 milliLiter(s) (75 mL/Hr) IV Continuous <Continuous>  heparin   Injectable 5000 Unit(s) SubCutaneous every 12 hours  levothyroxine Injectable 12.5 MICROGram(s) IV Push at bedtime  lidocaine   Patch 1 Patch Transdermal every 24 hours  metroNIDAZOLE  IVPB 500 milliGRAM(s) IV Intermittent every 8 hours  pantoprazole  Injectable 40 milliGRAM(s) IV Push daily  polyethylene glycol 3350 17 Gram(s) Oral at bedtime    MEDICATIONS  (PRN):  morphine  - Injectable 0.5 milliGRAM(s) IV Push every 4 hours PRN Moderate Pain (4 - 6)  morphine Concentrate 5 milliGRAM(s) SubLingual every 4 hours PRN Moderate Pain (4 - 6) or Dyspnea  ondansetron Injectable 4 milliGRAM(s) IV Push every 6 hours PRN Nausea and/or Vomiting        I&O's Summary    08 Mar 2021 07:01  -  09 Mar 2021 07:00  --------------------------------------------------------  IN: 300 mL / OUT: 650 mL / NET: -350 mL        PHYSICAL EXAM:  Vital Signs Last 24 Hrs  T(C): 36.7 (08 Mar 2021 22:30), Max: 36.8 (08 Mar 2021 19:20)  T(F): 98.1 (08 Mar 2021 22:30), Max: 98.3 (08 Mar 2021 19:20)  HR: 73 (09 Mar 2021 09:01) (60 - 73)  BP: 149/64 (09 Mar 2021 09:01) (92/51 - 149/64)  BP(mean): --  RR: 18 (09 Mar 2021 09:01) (18 - 20)  SpO2: 94% (09 Mar 2021 09:01) (94% - 100%)    CONSTITUTIONAL: frail, cachetic appearing woman.  unresponsive  ENMT: Neck supple, dry oral mucosa  RESPIRATORY: shallow respiratory effort; lungs decreased BS bilaterally without wheezes/crackles.  No stridor  CARDIOVASCULAR: Regular rate and rhythm, normal S1 and S2.  loud systolic murmur throughout. no rub/gallop.  No lower extremity edema.  Peripheral pulses are 1+ bilaterally  ABDOMEN: firm, ? Nontender to palpation, no bowel sounds, no guarding  MUSCLOSKELETAL:  generalized atrophy. no clubbing or cyanosis of digits. no joint swelling.    PSYCH: opens eyes.  not responsive  NEUROLOGY: CN 2-12 grossly intact and symmetric; no gross sensory or motor deficits   SKIN: No rashes, ecchymosis BLE on shins      LABS:                        9.2    1.60  )-----------( 87       ( 09 Mar 2021 07:09 )             28.3     03-09    144  |  109<H>  |  68.0<H>  ----------------------------<  75  5.2   |  21.0<L>  |  3.61<H>    Ca    8.4<L>      09 Mar 2021 07:09  Phos  6.4     03-08  Mg     2.7     03-08    TPro  6.2<L>  /  Alb  3.4  /  TBili  0.4  /  DBili  x   /  AST  107<H>  /  ALT  101<H>  /  AlkPhos  129<H>  03-09              Culture - Blood (collected 07 Mar 2021 10:41)  Source: .Blood Blood-Peripheral  Preliminary Report (09 Mar 2021 11:01):    No growth at 48 hours    Culture - Blood (collected 07 Mar 2021 10:41)  Source: .Blood Blood-Peripheral  Preliminary Report (09 Mar 2021 11:01):    No growth at 48 hours      CAPILLARY BLOOD GLUCOSE            RADIOLOGY & ADDITIONAL TESTS:                                            
[facility] Division of Hospital Medicine    SUBJECTIVE / OVERNIGHT EVENTS:  received morphine .5 x 2 for pain.  no abd pain now- but complaints of shoulder pain.     No fever, vomiting, diarrhea.  All remainder ROS unable to obtain secondary to sedated/[place]    MEDICATIONS  (STANDING):  acetaminophen    Suspension .. 650 milliGRAM(s) Oral every 8 hours  acetaminophen  IVPB .. 750 milliGRAM(s) IV Intermittent once  ciprofloxacin   IVPB 200 milliGRAM(s) IV Intermittent daily  dextrose 5% + lactated ringers. 1000 milliLiter(s) (100 mL/Hr) IV Continuous <Continuous>  heparin   Injectable 5000 Unit(s) SubCutaneous every 12 hours  hydrALAZINE 50 milliGRAM(s) Oral three times a day  levothyroxine Injectable 12.5 MICROGram(s) IV Push at bedtime  lidocaine   Patch 1 Patch Transdermal every 24 hours  metroNIDAZOLE  IVPB 500 milliGRAM(s) IV Intermittent every 8 hours  pantoprazole  Injectable 40 milliGRAM(s) IV Push daily  polyethylene glycol 3350 17 Gram(s) Oral at bedtime    MEDICATIONS  (PRN):  morphine  - Injectable 0.5 milliGRAM(s) IV Push every 4 hours PRN Moderate Pain (4 - 6)  morphine Concentrate 5 milliGRAM(s) SubLingual every 4 hours PRN Moderate Pain (4 - 6) or Dyspnea  ondansetron Injectable 4 milliGRAM(s) IV Push every 6 hours PRN Nausea and/or Vomiting        I&O's Summary    08 Mar 2021 07:01  -  08 Mar 2021 14:15  --------------------------------------------------------  IN: 0 mL / OUT: 550 mL / NET: -550 mL        PHYSICAL EXAM:  Vital Signs Last 24 Hrs  T(C): 37 (08 Mar 2021 12:15), Max: 37 (08 Mar 2021 12:15)  T(F): 98.6 (08 Mar 2021 12:15), Max: 98.6 (08 Mar 2021 12:15)  HR: 58 (08 Mar 2021 12:15) (58 - 86)  BP: 122/65 (08 Mar 2021 12:15) (114/55 - 171/66)  BP(mean): --  RR: 20 (08 Mar 2021 12:15) (16 - 20)  SpO2: 96% (08 Mar 2021 12:15) (93% - 97%)        CONSTITUTIONAL: frail, cachetic appearing woman- in pain, uncomfortable.  Sleepy, but arouses.  [place]- extremely  ENMT: Neck supple, dry oral mucosa, no pharyngeal  exudates  RESPIRATORY: Normal respiratory effort; lungs decreased BS bilaterally without wheezes/crackles.  No stridor  CARDIOVASCULAR: Regular rate and rhythm, normal S1 and S2.  loud systolic murmur throughout. no rub/gallop.  No lower extremity edema.  Peripheral pulses are 1+ bilaterally  ABDOMEN: firm, ? Nontender to palpation, no bowel sounds, no guarding; No hepatosplenomegaly  MUSCLOSKELETAL:  generalized atrophy. no clubbing or cyanosis of digits. no joint swelling.  BUE with pain with movement  PSYCH: A+O to person, place  NEUROLOGY: CN 2-12 grossly intact and symmetric; no gross sensory or motor deficits   SKIN: No rashes, ecchymosis BLE on shins    LABS:                        6.9    6.82  )-----------( 127      ( 08 Mar 2021 08:59 )             22.2     03-08    145  |  108<H>  |  71.0<H>  ----------------------------<  81  5.3   |  21.0<L>  |  3.98<H>    Ca    8.7      08 Mar 2021 07:36  Phos  6.4     03-08  Mg     2.7     03-08    TPro  6.5<L>  /  Alb  3.6  /  TBili  0.4  /  DBili  0.2  /  AST  186<H>  /  ALT  135<H>  /  AlkPhos  158<H>  03-08    PT/INR - ( 07 Mar 2021 10:41 )   PT: 10.4 sec;   INR: 0.89 ratio         PTT - ( 07 Mar 2021 10:41 )  PTT:45.0 sec          CAPILLARY BLOOD GLUCOSE            RADIOLOGY & ADDITIONAL TESTS:                                            
[facility] Division of Hospital Medicine    Chief Complaint:      SUBJECTIVE / OVERNIGHT EVENTS:    Patient denies chest pain, SOB, abd pain, N/V, fever, chills, dysuria or any other complaints. All remainder ROS negative.     MEDICATIONS  (STANDING):  acetaminophen    Suspension .. 650 milliGRAM(s) Oral every 8 hours  ciprofloxacin   IVPB 200 milliGRAM(s) IV Intermittent daily  dextrose 5% + lactated ringers. 1000 milliLiter(s) (75 mL/Hr) IV Continuous <Continuous>  influenza   Vaccine 0.5 milliLiter(s) IntraMuscular once  levothyroxine Injectable 12.5 MICROGram(s) IV Push at bedtime  lidocaine   Patch 1 Patch Transdermal every 24 hours  metroNIDAZOLE  IVPB 500 milliGRAM(s) IV Intermittent every 8 hours  pantoprazole  Injectable 40 milliGRAM(s) IV Push daily    MEDICATIONS  (PRN):  morphine  - Injectable 0.5 milliGRAM(s) IV Push every 4 hours PRN Moderate Pain (4 - 6)  morphine Concentrate 5 milliGRAM(s) SubLingual every 4 hours PRN Moderate Pain (4 - 6) or Dyspnea  ondansetron Injectable 4 milliGRAM(s) IV Push every 6 hours PRN Nausea and/or Vomiting        I&O's Summary      PHYSICAL EXAM:  Vital Signs Last 24 Hrs  T(C): 36.3 (10 Mar 2021 04:57), Max: 36.6 (09 Mar 2021 16:19)  T(F): 97.4 (10 Mar 2021 04:57), Max: 97.8 (09 Mar 2021 16:19)  HR: 65 (10 Mar 2021 04:57) (61 - 65)  BP: 134/61 (10 Mar 2021 04:57) (93/41 - 134/61)  BP(mean): --  RR: 18 (10 Mar 2021 04:57) (16 - 18)  SpO2: 98% (10 Mar 2021 04:57) (98% - 100%)        CONSTITUTIONAL: A & O x 4, NAD, well-developed, well-groomed  ENMT: Neck supple, Moist oral mucosa, no pharyngeal  exudates  RESPIRATORY: Normal respiratory effort; lungs are clear to auscultation bilaterally without wheezes/crackles.  No stridor  CARDIOVASCULAR: Regular rate and rhythm, normal S1 and S2.  no murmur. no rub/gallop.  No lower extremity edema.  Peripheral pulses are 2+ bilaterally  ABDOMEN: Soft, non-distended. Nontender to palpation, normoactive bowel sounds, no rebound/guarding; No hepatosplenomegaly  MUSCLOSKELETAL:  No atrophy. no clubbing or cyanosis of digits. no joint swelling or tenderness to palpation  PSYCH: A+O to person, place, and time; affect appropriate  NEUROLOGY: CN 2-12 grossly intact and symmetric; no gross sensory or motor deficits   SKIN: No rashes, no palpable lesions    LABS:                        9.3    5.33  )-----------( 86       ( 10 Mar 2021 07:57 )             28.7     03-10    143  |  108<H>  |  69.0<H>  ----------------------------<  79  5.3   |  18.0<L>  |  3.69<H>    Ca    8.2<L>      10 Mar 2021 07:57    TPro  6.6  /  Alb  3.6  /  TBili  0.3<L>  /  DBili  x   /  AST  84<H>  /  ALT  86<H>  /  AlkPhos  127<H>  03-10              CAPILLARY BLOOD GLUCOSE            RADIOLOGY & ADDITIONAL TESTS:

## 2021-03-10 NOTE — DISCHARGE NOTE PROVIDER - NSDCCPCAREPLAN_GEN_ALL_CORE_FT
PRINCIPAL DISCHARGE DIAGNOSIS  Diagnosis: Acute pancreatitis, unspecified complication status, unspecified pancreatitis type  Assessment and Plan of Treatment: s/p trial of antibiotics, IVF.  now transitioned to comfort care      SECONDARY DISCHARGE DIAGNOSES  Diagnosis: Acute renal failure superimposed on chronic kidney disease, unspecified CKD stage, unspecified acute renal failure type  Assessment and Plan of Treatment: s/p trial of IVF.  comfort care    Diagnosis: Anemia, unspecified type  Assessment and Plan of Treatment: s/p PRBC x 2

## 2021-03-10 NOTE — HOSPICE CARE NOTE - CONVESATION DETAILS
Discussed hospice services with patient's daughter. 
Dr. Sharif confirmed that patient is cleared for DC home.  Report received from assigned RN  Text received from Maryse Loo MSW stating that CDPAP will be resumed.  DME ordered to be delivered today.
Patient's daughter signed hospice consents.   Called Dr. Sharif to confirm if patient is ready for DC home.  Dr. Sharif will call patient's daughter to confirm that she is in agreement with the Hospice plan of care.

## 2021-03-10 NOTE — PROGRESS NOTE ADULT - ASSESSMENT
89 yr old [place] female with PMH of Chronic diastolic CHF EF70%, moderate AS, hypertension, hypothyroid, breast cancer s/p lumpectomy and radiation in 2007, compression fx T11-T12, sigmoid diverticulitis, gallstone pancreatitis in April 2019 presents to ED with c/o abdominal pain and vomiting.  Lipase >3000, LFT's elevated.  CT: GS, dilated CBD ? cholecystitis, US with same.  ABX started with cipro/flagyl.  GI consulted.  After d/w family- no aggressive interventions: no perc drain, no surg options.  They are ok with supportive treatment including fluids, trial of abx, blood transfusion.  PC consulted and confirmed DNR/DNI and goals with family.  Pt has continued to decline despite, antibiotics, fluids, supportive care.  She is now not eating and unresponsive.  Further d/w family with the assistance of PC/hospice.  Pt will go home with hospice to follow with comfort measures.    #Acute GS Pancreatitis/acute cholecystitis  -Elevated LFTs, Lipase  -normal bili.  -CT/US with possible cholecystitis- no pancreas changes  -GI consulted: no further reccs  -blood cx no growth      #SIRS/ acute cholecystitis  -hypothermia  -acute cholecystitis  -s/p IVF, ABX    # Hypernatremia  -2/2 dehydration  -s/p IVF    #acute/chronic anemia  -transfused 2 units 3/8    # NAVIN on CKD stg 4 with metabolic acidosis  -IVF/PRBC    # Hypoglycemia  -s/p D5  -CL diet: not eating 2/2 AMS      # Chronic diastolic CHF EF70%, moderate AS, hypertension, hypothyroidism  -holding all home meds    dnr/dni  d/w hospice and with daughter by phone again today-transition to comfort- will go home today with home hospice.  d/w hospice services who is ordering equipment to house this afternoon.

## 2021-03-10 NOTE — HOSPICE CARE NOTE - DME DETAILS
Hospital bed, Mattress, Oxygen, Commode. 
Oxygen and a commode to be ordered as soon as DC is confirmed.
Oxygen and Commode to be ordered.

## 2021-03-10 NOTE — DISCHARGE NOTE NURSING/CASE MANAGEMENT/SOCIAL WORK - PATIENT PORTAL LINK FT
You can access the FollowMyHealth Patient Portal offered by [facility] by registering at the following website: [email or web address]. By joining [facility]’s FollowMyHealth portal, you will also be able to view your health information using other applications (apps) compatible with our system.

## 2021-03-10 NOTE — DISCHARGE NOTE PROVIDER - NSDCMRMEDTOKEN_GEN_ALL_CORE_FT
DME: Diego walker: Use as needed when ambulating  furosemide 20 mg oral tablet: 1 tab(s) orally once a day  hydrALAZINE 50 mg oral tablet: 1 tab(s) orally 3 times a day  Synthroid 25 mcg (0.025 mg) oral tablet: 1 tab(s) orally once a day Mon-Fri  2 tabs orally once a day Sat-Sun  Vitamin D3 50,000 intl units (1250 mcg) oral capsule: 1 tab(s) orally every 7 days

## 2021-03-22 NOTE — DISCHARGE NOTE NURSING/CASE MANAGEMENT/SOCIAL WORK - NSTRANSFERBELONGINGSRESP_GEN_A_NUR
Palliative Care Initial Note  Palliative Care Admit date:03/22/21    Advance Directives:DNR-CC    Plan of care/goals: IPU with HOC    Social/Spiritual: Prayer support offered and accepted. Plan: [address] Reason for consult: Multi- System Organ Failure. Writer met with the spouse at bedside and hospice list provided. spouse chose HOC and referral called to Lisette Taylor with [address]. Spouse states she is worn out emotionally and physically and needs to go home and rest, requests [address] meeting tomorrow. Lisette Taylor with HOS aware that family requests to meet on 03/23/21. Writer updated ZANE Robert on above POC.  Following.    ___ Advance Care Planning  __x_ Transition of Care Planning  ___ Psychosocial/Spiritual Support  __x_ Symptom Management    [address] [facility] yes

## 2021-06-12 NOTE — CONSULT NOTE ADULT - CONSULT REQUESTED DATE/TIME
Diabetes Educator has received verbal consent for a telephone visit for the patient./ 30 minutes      Assessment:   --patient states that since starting Victoza she has lost 6#, she continues to attend weekly TOPS meetings with she enjoys, they discuss nutrition and weight loss.  --patient is walking daily, although she does have quite a bit of back pain, she is under MD supervision, she also goes to the gym 3x/week for 15 minutes: 5 minutes on the bike, 10 minutes for upper body strengthening.  -- no s/s of hypoglycemia or hyperglycemia  -sleeping is so so due to back discomfort  --eye exam scheduled for 11/9/20, dental exam 12/24/20  --she is hydrating with water and black tea, she has coffee in the am, no sugary beverages  -- meal pattern is logged daily, no changes since 10/1/20 assessment     Blood glucose record:   Educator did not see the meter  FBS:151,144,116,153,160,168,178  Post am:205,161,119,161,146  Pre Lunch:148,145,117,215,135  Pre dinner:151,132,160,161,127,147  Bedtime:120,119,110,143,137,137    Medication prescribed:   Lantus 100 units at bedtime  Humalog 34 units with meals, plus sliding scale  151-200 2 units  201-250 4 units  251-300 6 units    Metformin 2000 mg/day  Victoza 0.6 mg/day    Education:   --reviewed BG record, goals for BG and A1C  --discussed weight loss on Victoza, participation/support in TOPS group, nutrition, meal planning and hydration.  --discussed current exercise routine, preventative care: eyes, teeth, feet.       Plan:   1. Per consultation with MD, patient to increase Victoza dose to 1.2 mg/day.   2. Patient to check glucose 5x/day: fasting, post am meal, pre lunch, pre dinner, bedtime.  3. Patient to exercise as able/allowed per MD.  4. Follow up with educator on 11/18/20.     Subjective and Objective:      Annel Fugren is referred by Gaye marie for Diabetes Education.     Lab Results   Component Value Date    HGBA1C 8.6 (H) 05/06/2020     Outside source  03-Nov-2019 15:11 for HGBA1C 20=9.8%                    Education:     Monitoring   Meter (per above goals): Assessed, Discussed and Competent  Monitoring: Assessed and Discussed  BG goals: Assessed and Discussed    Nutrition Management  Nutrition Management: Assessed and Discussed  Weight: Assessed and Discussed  Portions/Balance: Assessed and Discussed  Carb ID/Count: Assessed  Label Reading: Not addressed  Heart Healthy Fats: Assessed  Menu Planning: Assessed and Discussed  Dining Out: Not addressed  Physical Activity: Assessed and Discussed    Orals: Assessed and Discussed  Injected Medications: Assessed and Discussed   Storage/Exp:Assessed   Site Rotation: Assessed   Sites Assessed: no    Diabetes Disease Process: Assessed and Discussed    Acute Complications: Prevent, Detect, Treat:  Hypoglycemia: Assessed and Discussed  Hyperglycemia: Assessed and Discussed  Sick Days: Assessed  Driving: does not drive    Chronic Complications  Foot Care:Assessed and Discussed  Skin Care: Assessed, Discussed and eye exam scheduled for 20  Eye: Assessed, Discussed and eye exam scheduled for 20   ABC: Assessed  Teeth:Assessed, Discussed and dental exam scheduled for 20   Goal Setting and Problem Solving: Assessed and Discussed  Barriers: Assessed  Psychosocial Adjustments: Assessed      Time spent with the patient: 30 minutes for diabetes education and counseling.   Previous Education: yes  Visit Type:DSMT  Hours Remainin, DSMT 0 and MNT 1.25      Johana Evans  10/29/2020

## 2022-01-01 NOTE — DISCHARGE NOTE NURSING/CASE MANAGEMENT/SOCIAL WORK - NSPROEXTENSIONSOFSELF_GEN_A_NUR
none General instructions   Please follow up with your pediatrician in 1-2 days. Please also follow up with the physician who performed your child's circumcision. You can continue to apply Vaseline as previously instructed.    •Keep the area clean and dry.      •Dry gently by blotting with a dry cloth.      •Change your baby's diapers as soon as they are wet. Ask other caregivers to do this as well.      •Allow for some diaper-free time as much as possible until healed.      • Do not use diaper wipes until this problem goes away. Use warm water instead.      •If you use cloth diapers, use a mild detergent. Do not use bleach until the skin has healed. It may be best to switch to disposable diapers until the condition clears up.      •Avoid rubbing the red areas.        Contact a health care provider if:    •The redness and swelling are not better in 2–3 days.      •The problem comes back after it improved.      •The redness and swelling get worse.      •Your baby has a fever.        Get help right away if:    •Your baby who is younger than 3 months has a temperature of 100.4°F (38°C) or higher.      •Your baby has symptoms for more than 72 hours.      •Your baby's symptoms suddenly get worse.      •Pus is coming from the tip of your baby's penis.      •Your baby cannot urinate.

## 2022-06-10 NOTE — HISTORY OF PRESENT ILLNESS
Problem: Discharge Planning  Goal: Discharge to home or other facility with appropriate resources  Outcome: Progressing     Problem: Pain  Goal: Verbalizes/displays adequate comfort level or baseline comfort level  Outcome: Not Progressing  Flowsheets  Taken 6/10/2022 0200  Verbalizes/displays adequate comfort level or baseline comfort level: Encourage patient to monitor pain and request assistance  Taken 6/9/2022 2200  Verbalizes/displays adequate comfort level or baseline comfort level: Encourage patient to monitor pain and request assistance  Taken 6/9/2022 2000  Verbalizes/displays adequate comfort level or baseline comfort level: Encourage patient to monitor pain and request assistance     Problem: Respiratory - Adult  Goal: Achieves optimal ventilation and oxygenation  Outcome: Progressing     Problem: Skin/Tissue Integrity  Goal: Absence of new skin breakdown  Description: 1. Monitor for areas of redness and/or skin breakdown  2. Assess vascular access sites hourly  3. Every 4-6 hours minimum:  Change oxygen saturation probe site  4. Every 4-6 hours:  If on nasal continuous positive airway pressure, respiratory therapy assess nares and determine need for appliance change or resting period. Outcome: Progressing     Problem: ABCDS Injury Assessment  Goal: Absence of physical injury  Outcome: Progressing     Problem: Safety - Adult  Goal: Free from fall injury  Outcome: Progressing     Problem: Nutrition Deficit:  Goal: Optimize nutritional status  Outcome: Progressing     Problem: Safety - Medical Restraint  Goal: Remains free of injury from restraints (Restraint for Interference with Medical Device)  Description: INTERVENTIONS:  1. Determine that other, less restrictive measures have been tried or would not be effective before applying the restraint  2. Evaluate the patient's condition at the time of restraint application  3. Inform patient/family regarding the reason for restraint  4.  Q2H: Monitor [FreeTextEntry8] : patient granddaughter states patient was c/o SOB on Wednesday.  Patient states she is not short of breathe today, she is having neck pain today that’s going to head.\par Grand daughter thinks she may be anxious safety, psychosocial status, comfort, nutrition and hydration  Outcome: Progressing  Flowsheets  Taken 6/10/2022 0200  Remains free of injury from restraints (restraint for interference with medical device): Every 2 hours: Monitor safety, psychosocial status, comfort, nutrition and hydration  Taken 6/10/2022 0000  Remains free of injury from restraints (restraint for interference with medical device): Every 2 hours: Monitor safety, psychosocial status, comfort, nutrition and hydration  Taken 6/9/2022 2200  Remains free of injury from restraints (restraint for interference with medical device): Every 2 hours: Monitor safety, psychosocial status, comfort, nutrition and hydration  Taken 6/9/2022 2000  Remains free of injury from restraints (restraint for interference with medical device): Every 2 hours: Monitor safety, psychosocial status, comfort, nutrition and hydration

## 2022-06-17 NOTE — PATIENT PROFILE ADULT - NSPROPTRIGHTREPNAME_GEN_A__NUR
[Dear  ___] : Dear  [unfilled], [Consult Letter:] : I had the pleasure of evaluating your patient, [unfilled]. [Please see my note below.] : Please see my note below. [Consult Closing:] : Thank you very much for allowing me to participate in the care of this patient.  If you have any questions, please do not hesitate to contact me. [Sincerely,] : Sincerely, [FreeTextEntry3] : Brandon Fung M.D. [facility]

## 2022-09-12 NOTE — DISCHARGE NOTE PROVIDER - NSDCDCMDCOMP_GEN_ALL_CORE
Case Management Progress Note    Patient name Patrice Aguilar  Location [facility] [phone] MRN [id number]  : 1934 Date 2022       LOS (days): 4  Geometric Mean LOS (GMLOS) (days): 2 90  Days to GMLOS:-1 2        OBJECTIVE:        Current admission status: Inpatient  Preferred Pharmacy:   [address] [facility], [address] [place] [address] [address]  [address] [address]  Phone: [phone] Fax: [phone]    Primary Care Provider: Tang Sena MD    Primary Insurance: MEDICARE  Secondary Insurance:     PROGRESS NOTE: This CM requested neuropsych evaluation to be ordered by medical team to determine competency, if deemed necessary by medical team This document is complete and the patient is ready for discharge.

## 2022-11-06 NOTE — PATIENT PROFILE ADULT - NSPROIMPLANTSMEDDEV_GEN_A_NUR
None
44 y/o F with h/o gall bladder problem presents to Ed c/o severe upper abdominal pain since few hours after she ate, her pain always increased after food intake and she is unable eat even a small quantity. mild Nausea , no fever.

## 2023-01-05 NOTE — PATIENT PROFILE ADULT - DO YOU WANT TO COMPLETE THE HCP AND NAME A HEALTH CARE AGENT
Erythema (Lower Limbs To Buttocks): Moderate
Lichenification (Upper Limbs): Mild
Age Of Child: 74
Body Surface % (Trunk To Groin): 50-69%
Detail Level: Detailed
Investigator's Global Assessment:  In Your Experience, Among All Patients You Have Seen With This Condition, How Severe Is This Patient's Condition?: 4
no

## 2023-02-13 NOTE — ED PROVIDER NOTE - DISCHARGE REVIEW MATERIAL PRESENTED
Total Number Of Aks Treated: 9 Render Post-Care Instructions In Note?: no Consent: The patient's consent was obtained including but not limited to risks of crusting, scabbing, blistering, scarring, darker or lighter pigmentary change, recurrence, incomplete removal and infection. Duration Of Freeze Thaw-Cycle (Seconds): 0 Post-Care Instructions: I reviewed with the patient in detail post-care instructions. Patient is to wear sunprotection, and avoid picking at any of the treated lesions. Pt may apply Vaseline to crusted or scabbing areas. Detail Level: Zone Medical Necessity Clause: This procedure was medically necessary because the lesions that were treated were: Medical Necessity Information: It is in your best interest to select a reason for this procedure from the list below. All of these items fulfill various CMS LCD requirements except the new and changing color options. Show Spray Paint Technique Variable?: Yes Spray Paint Text: The liquid nitrogen was applied to the skin utilizing a spray paint frosting technique. Detail Level: Detailed .
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
## 2024-01-30 NOTE — ED PROVIDER NOTE - CLINICAL SUMMARY MEDICAL DECISION MAKING FREE TEXT BOX
No Vaccines Administered. 89 yo F p/w chest pain and sob found to have elevated probnp 4000 worse compared prior. trop 0.02. K 6. patient given medication for hyperkalemia and for CHF. patient will need admission for CHF

## 2024-03-20 NOTE — ED ADULT NURSE NOTE - PAIN: BODY LOCATION
Dr Edwardsaid here to see patient  reviewed ABG results , wanted to give another amp of  Bicarb and was given  Patient opens eyes when name is called moving all extremities . Patient withdraws with  pain .  abdomen

## 2025-02-10 NOTE — PHYSICAL THERAPY INITIAL EVALUATION ADULT - REFERRING PHYSICIAN, REHAB EVAL
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 Patient denies any preoperative positive review of systems. Patient pain controlled. Patient with out any acute issues currently.     Past Medical History:  Past Medical History:    ANXIETY    Aortic aneurysm    Cervical radiculitis    COPD (chronic obstructive pulmonary disease) (HCC)    Coronary atherosclerosis of unspecified type of vessel, native or graft    H/O degenerative disc disease    HYPERLIPIDEMIA    Irritable bowel syndrome    Lumbar radiculitis    Lumbar spondylosis    OSTEOARTHRITIS    OSTEOPOROSIS    Other and unspecified personal history of malignant neoplasm    Pulmonary hypertension (HCC)    Venous insufficiency        Past Surgical History:   Past Surgical History:   Procedure Laterality Date    Appendectomy      Colonoscopy      Epidurography, radiological s & i  3/12/2012    Procedure: TRANSFORAMINAL EPIDURAL - LUMBAR;  Surgeon: Joe Gonsales MD;  Location: [facility]    Hernia surgery      Hysterectomy      Injection, anesthetic/steroid, transforaminal epidural; lumbar/sacral, single level  2/3/2012    Performed by MALORIE GALICIA at [facility]     Injection, anesthetic/steroid, transforaminal epidural; lumbar/sacral, single level  2/21/2012    Procedure: TRANSFORAMINAL EPIDURAL - LUMBAR;  Surgeon: Alex Hartman MD;  Location: [facility]    Injection, w/wo contrast, dx/therapeutic substance, epidural/subarachnoid; cervical/thoracic  3/12/2012    Procedure: TRANSFORAMINAL EPIDURAL - LUMBAR;  Surgeon: Joe Gonsales MD;  Location: [facility]    Injection, w/wo contrast, dx/therapeutic substance, epidural/subarachnoid; cervical/thoracic N/A 3/22/2016    Procedure: CERVICAL EPIDURAL;  Surgeon: Joe Gonsales MD;  Location: [facility]    Ir varicose vein endovenous laser ablation(cpt=36478)  2014    left SSV    Ir varicose vein endovenous laser ablation(cpt=36478) Right 09/2020    Laminectomy      laminectomy    M-sedaj by  phys perfrmg svc 5+ yr  2/3/2012    Performed by MALORIE GALICIA at [facility]    M-sedaj by  phys perfrmg svc 5+ yr  2/21/2012    Procedure: TRANSFORAMINAL EPIDURAL - LUMBAR;  Surgeon: Alex Hartman MD;  Location: [facility]    M-sedaj by  phys perfrmg svc 5+ yr  3/12/2012    Procedure: TRANSFORAMINAL EPIDURAL - LUMBAR;  Surgeon: Joe Gonsales MD;  Location: [facility]    M-sedaj by  phys perfrmg svc 5+ yr N/A 3/22/2016    Procedure: CERVICAL EPIDURAL;  Surgeon: Joe Gonsales MD;  Location: [facility]    Other surgical history      anterior repair of pelvic floor    Patient documented not to have experienced any of the following events N/A 3/22/2016    Procedure: CERVICAL EPIDURAL;  Surgeon: Joe Gonsales MD;  Location: [facility]    Patient withough preoperative order for iv antibiotic surgical site infection prophylaxis. N/A 3/22/2016    Procedure: CERVICAL EPIDURAL;  Surgeon: Joe Gonsales MD;  Location: [facility]    Repair ing  flores(ordering), dr. ayala(attending) hernia,5+y/o,reducibl      Repair shoulder capsule,anterior      Sclerotherapy Bilateral 2014    Special service or report      removal of colon mass    Spinal fusion      cervical fusion    Tonsillectomy      Total abdom hysterectomy      Vein ligation and stripping Bilateral     about 1980       Social History:  reports that she quit smoking about 19 years ago. Her smoking use included cigarettes. She has never used smokeless tobacco. She reports that she does not drink alcohol and does not use drugs. No illegal drugs, , 4 children, no cane or walker, son lives with pt    Family History:   Family History   Problem Relation Age of Onset    Heart Disorder Father    Mother passed  Father passed    Allergies: Allergies[1]    Medications:  Medications Ordered Prior to Encounter[2]    Review of Systems:   A comprehensive 14 point review of systems was completed.    Pertinent positives and negatives noted in the HPI.    Physical Exam:    BP (!) 183/67 (BP Location: Radial)   Pulse 70   Temp 98.9 °F (37.2 °C) (Temporal)   Resp 10   Ht 5' 4\" (1.626 m)   Wt 119 lb 9.6 oz (54.3 kg)   SpO2 98%   BMI 20.53 kg/m²   General: No acute distress. Alert and oriented  HEENT: Normocephalic atraumatic. Moist mucous membranes. EOM-I.  Neck: No JVD. No carotid bruits.  Respiratory: Clear to auscultation bilaterally. No wheezes. No crackles, no wheezing  Cardiovascular: S1, S2. Regular rate and rhythm. No murmur  Chest and Back: No tenderness or deformity.  Abdomen: Soft, nontender, nondistended.  Positive bowel sounds. No rebound, guarding   Neurologic: No focal neurological deficits. CNII-XII grossly intact. Sensation and strength intact  Musculoskeletal: Moves all extremities.  Extremities: No edema or tenderness of the LE  Integument: No new rashes or lesions.   Psychiatric: Appropriate mood and affect.      Diagnostic Data:      Labs:  Recent Labs   Lab 02/10/25  0655   INR 1.00       No results for input(s): \"GLU\",  \"BUN\", \"CREATSERUM\", \"GFRAA\", \"GFRNAA\", \"CA\", \"ALB\", \"NA\", \"K\", \"CL\", \"CO2\", \"ALKPHO\", \"AST\", \"ALT\", \"BILT\", \"TP\" in the last 168 hours.    CrCl cannot be calculated (Patient's most recent lab result is older than the maximum 7 days allowed.).    Recent Labs   Lab 02/10/25  0655   PTP 13.3   INR 1.00       No results for input(s): \"TROP\", \"CK\" in the last 168 hours.    Imaging: Imaging data reviewed in Epic.      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   -vascular following  -cardene for BP control     Post Operative Pain Control  -toradol iv atc  -Lidocaine patch  -acetaminophen po prn  -norco po prn    Anxiety  -fluoxetine    COPD   -advair  -incruse    Quality:  DVT Prophylaxis: scd, heparin sq  CODE status: DNR select per chart  [place]:     Plan of care discussed with patient and staff    Dispo: no discharge    Ramses Serna MD  [facility]  [phone]                 [1]   Allergies  Allergen Reactions    Duragesic [Fentanyl] ITCHING     Had rxn to adhesive at one point. Patient denies any reaction to Fentanyl, only to the adhesive   [2]   Current Facility-Administered Medications on File Prior to Encounter   Medication Dose Route Frequency Provider Last Rate Last Admin    [COMPLETED] morphINE PF 4 MG/ML injection 4 mg   4 mg Intravenous Once Maryellen Max MD   4 mg at 12/15/24 1532    [COMPLETED] ondansetron (Zofran) 4 MG/2ML injection 4 mg  4 mg Intravenous Once Maryellen Max MD   4 mg at 12/15/24 1529    [COMPLETED] iopamidol 76% (ISOVUE-370) injection for power injector  70 mL Intravenous ONCE PRN Maryellen Max MD   70 mL at 12/15/24 1752    [COMPLETED] HYDROcodone-acetaminophen (Norco) 5-325 MG per tab 1 tablet  1 tablet Oral Once Maryellen Max MD   1 tablet at 12/15/24 1826     Current Outpatient Medications on File Prior to Encounter   Medication Sig Dispense Refill    HYDROcodone-acetaminophen  MG Oral Tab Take 1 tablet by mouth every 8 (eight) hours as needed for Pain.      HYDROcodone-acetaminophen 5-325 MG Oral Tab Take 1 tablet by mouth every 6 (six) hours as needed for Pain.      Cholecalciferol (VITAMIN D) 25 MCG (1000 UT) Oral Tab Take 1 tablet by mouth daily.      INCRUSE ELLIPTA 62.5 MCG/INH Inhalation Aerosol Powder, Breath Activated Inhale 1 puff into the lungs daily. 30 each 1    FLUOXETINE HCL 40 MG Oral Cap TAKE 1 CAPSULE(40 MG) BY MOUTH DAILY 90 capsule 0    PROAIR  (90 Base) MCG/ACT Inhalation Aero Soln Inhale 2 puffs into the lungs 4 (four) times daily as needed. 1 Inhaler 12    Multiple Vitamins-Minerals (MULTIVITAL) Oral Chew Tab Chew 1 tablet by mouth daily.      SYMBICORT 160-4.5 MCG/ACT Inhalation Aerosol INL 2 PFS PO BID. RM AFTER EACH U (Patient taking differently: Inhale 2 puffs into the lungs 2 (two) times daily.) 1 Inhaler 5    Calcium Carbonate (CALCIUM 500 OR) Take 2 tablets by mouth daily.